# Patient Record
Sex: MALE | Race: WHITE | NOT HISPANIC OR LATINO | Employment: OTHER | URBAN - METROPOLITAN AREA
[De-identification: names, ages, dates, MRNs, and addresses within clinical notes are randomized per-mention and may not be internally consistent; named-entity substitution may affect disease eponyms.]

---

## 2023-09-27 NOTE — PROGRESS NOTES
"Counseling:  The patient was counseled regarding diagnostic results, instructions for management, risk factor reductions, prognosis, patient and family education, impressions, risks and benefits of treatment options and importance of compliance with treatment.      Chief Complaint:   The patient presents today for 1-week followup of HTN.      History Of Present Illness:    Darius Roe is a 78 y.o. male patient who presents today for 1-week followup of HTN. His PMH is significant for CAD s/p PTCA/stent to the RCA 05/30/2018, paroxysmal atrial fibrillation (on Eliquis), aortic regurgitation, HTN, hyperlipidemia and syncope. Today, the patient reports persistent exertional drops in his blood pressure, although somewhat improved from prior. He reports that his SBP is consistently in the 130-135 range at rest.     Last Recorded Vitals:  Vitals:    10/02/23 1022   BP: 150/88   BP Location: Left arm   Pulse: 86   Weight: 72.7 kg (160 lb 3.2 oz)   Height: 1.778 m (5' 10\")        Family History:  Family History   Problem Relation Name Age of Onset    Other (malignant neoplasm of ovary) Mother      Heart attack Mother      Other (cerebrovascular accident) Father      Other (diabetes mellitus) Father      Lung cancer Father      Other (metastatic to brain) Father      Pancreatic cancer Sister      Liver cancer Brother      Other (malignant neoplasm of prostate) Brother      Other (primary cervical cancer) Maternal Grandmother          Allergies:  Ciprofloxacin    Outpatient Medications:  Current Outpatient Medications   Medication Instructions    albuterol 90 mcg/actuation inhaler 2 puffs, inhalation, 4 times daily PRN    amLODIPine (Norvasc) 5 mg tablet 1 tablet, oral, Daily    apixaban (Eliquis) 5 mg tablet 1 tablet, oral, 2 times daily,  AS DIRECTED BY THE [facility] ([phone] EXT. [id number])<BR>    aspirin 81 mg chewable tablet 1 tablet, oral, Daily    cholecalciferol (Vitamin D-3) 25 MCG (1000 UT) capsule " oral, Take as directed    cholecalciferol, vitamin D3, (D3-50 CHOLECALCIFEROL ORAL) HOLECALCIFEROL TAB <BR>Patient sig: TAKE 800 BY MOUTH EVERY DAY<BR><BR><BR>    losartan-hydrochlorothiazide (Hyzaar) 50-12.5 mg tablet 1 tablet, oral, Daily    multivitamin tablet 1 tablet, oral, Daily       Review of Systems   All other systems reviewed and are negative.    Physical Exam:  Constitutional:       Appearance: Healthy appearance. Not in distress.   Neck:      Vascular: No JVR. JVD normal.   Pulmonary:      Effort: Pulmonary effort is normal.      Breath sounds: Normal breath sounds. No wheezing. No rhonchi. No rales.   Chest:      Chest wall: Not tender to palpatation.   Cardiovascular:      PMI at left midclavicular line. Normal rate. Regular rhythm. Normal S1. Normal S2.       Murmurs: There is no murmur.      No gallop.  No click. No rub.   Pulses:     Intact distal pulses.   Edema:     Peripheral edema absent.   Abdominal:      General: Bowel sounds are normal.      Palpations: Abdomen is soft.      Tenderness: There is no abdominal tenderness.   Musculoskeletal: Normal range of motion.         General: No tenderness. Skin:     General: Skin is warm and dry.   Neurological:      General: No focal deficit present.      Mental Status: Alert and oriented to person, place and time.       Last Labs:  CBC -  Lab Results   Component Value Date    WBC 9.6 09/02/2023    HGB 16.0 09/02/2023    HCT 44.8 09/02/2023    MCV 94 09/02/2023     09/02/2023       CMP -  Lab Results   Component Value Date    CALCIUM 9.3 09/02/2023    PROT 7.0 09/02/2023    ALBUMIN 4.0 09/02/2023    AST 14 09/02/2023    ALT 16 09/02/2023    ALKPHOS 94 09/02/2023    BILITOT 0.6 09/02/2023       LIPID PANEL -   Lab Results   Component Value Date    CHOL 157 04/14/2021    HDL 34.9 (A) 04/14/2021    CHHDL 4.5 04/14/2021    VLDL 44 (H) 04/14/2021    TRIG 222 (H) 04/14/2021    NHDL 122 04/14/2021       RENAL FUNCTION PANEL -   Lab Results   Component  Value Date    K 4.3 09/02/2023       Lab Results   Component Value Date    BNP 39 05/12/2021       Last Cardiology Tests:  08/23/2023 - TTE  1. Left ventricular systolic function is low normal with a 50-55% estimated ejection fraction.  2. Spectral Doppler shows an impaired relaxation pattern of left ventricular diastolic filling.  3. There is asymmetric left ventricular hypertrophy.  4. Moderate aortic valve regurgitation.    08/23/2022 - TTE  1. Left ventricular systolic function is normal with a 55% estimated ejection fraction.  2. Spectral Doppler shows an impaired relaxation pattern of left ventricular diastolic filling.  3. Mild to moderate aortic valve regurgitation.    08/23/2022 - Stress Test  1. There is a moderate-sized primarily fixed perfusion defect in the inferior wall which resolves on prone imaging suggesting diaphragmatic attenuation. There is a low probability of ischemia. Calculated ejection fraction of 59% without wall motion abnormalities seen.  2. No clinical or electrocardiographic evidence for ischemia at maximal infusion. No ECG changes from baseline. Normal Stress Test.    11/10/2021 - MRI/MRA Brain and Neck  1. MRI Brain: Punctate focus of hyperintense DWI signal left centrum semiovale is too small to definitely characterize, otherwise there is no evidence of acute infarct or intracranial hemorrhage. Mild diffuse parenchymal volume loss with non-specific white matter T2 and FLAIR signal changes, likely representing sequela of  small vessel disease.  2. MRA: Intracranial vertebral arteries are only partially visualized on current exam, with segment of intracranial vertebral artery described on previous CTA of the neck dated 11/09/2021 not fully included on the study. Dedicated MRA of the head is necessary for more a thorough evaluation. Within limits of the study, there is no evidence of major vessel cutoff or high-grade stenosis on MRA neck.    11/10/2021 - CTA Head  No evidence for  high-grade stenosis or large branch vessel cutoffs of thee intracranial vessels. Specifically intracranial left vertebral artery is diminutive in appearance likely due to anatomic variation but otherwise grossly unremarkable.    11/09/2021 - CTA Neck  1. Potential filling defect due to thrombus or eccentric mural plaque of the intracranial vertebral artery on the left which is otherwise diminutive in caliber due to developmental variation. There is a short segment of at least moderate to severe narrowing. MRI of the brain may be of benefit for further evaluation.  2. Atherosclerotic changes of the carotid bifurcations and proximal ICAs with up to approximately 40-45% stenosis on the left.  3. Advanced, multilevel degenerative changes of the cervical spine.  4. Extensive emphysematous changes.    09/27/2021 - Implantation of Loop Recorder    08/23/2021 - CT Head  No acute intracranial process.    04/26/2021 to 05/25/2021 - Heart Monitor  1. Baseline transmission showing sinus rhythm with a heart rate of 80 bpm.   2. 2 episodes of SVT, the longest of which was 13 seconds duration.   3. 2 pauses of greater than 2 seconds, the longest of which was 2.4 seconds.   4. No symptoms reported.  5. PAC burden of less than 1%.  6. PVC burden of less than 1%.     05/03/2021 - TTE  1. The left ventricular systolic function is normal with a 60-65% estimated ejection fraction.  2. There is mild aortic valve regurgitation.  3. 3.9 cm ascending aorta.    06/01/2020 - Cardiac Catheterization ([facility])  Mild non-obstructive coronary artery disease.    05/12/2020 - TTE  1. The left ventricular systolic function is normal with a 55% estimated ejection fraction.  2. Spectral Doppler shows an impaired relaxation pattern of left ventricular diastolic filling.  3. The mitral valve is moderately thickened.  4. RVSP within normal limits.  5. Aortic valve stenosis is not present.  6. There is moderate aortic valve regurgitation.    04/15/2019 -  Echocardiogram   1. Normal left ventricular regional systolic function with an ejection fraction of 55%.  2. Normal right ventricular size and systolic function.  3. Trace mitral regurgitation.  4. Moderate aortic regurgitation.  5. Mild tricuspid regurgitation.    05/30/2018 - Cardiac Catheterization ([facility])  1. Single vessel coronary artery disease without proximal left anterior descending involvement.   2. Culprit vessel(s): right coronary artery.  3. Elevated LV filling pressures.  4. Left Ventricular end-diastolic pressure = 30.     Assessment/Plan   1) CAD s/p PCI of RCA 2018  Continue aspirin 81 mg daily, atorvastatin 80 mg daily.  Goal LDL less than 70  TTE 08/23/2023 with LVEF 50-55%, asymmetric LVH, moderate AR  Patient endorses a h/o sleep apnea, unable to tolerate CPAP - reports sleep disturbance and daytime fatigue, is a light sleeper.   Recommend sleep apnea f/u  Repeat echo 1 year    2) Paroxysmal atrial fibrillation  [id number] score is 4  On Eliquis 5 mg BID  On beta blocker    3) Aortic Regurgitation  TTE May 2021 with mild AR  TTE 08/23/2023 with moderate AR  Repeat echo 1 year    4) HTN  ED evaluation 09/05/2023 at [facility] - BP elevated at 183/79  [facility]  Patient describes multiple medication changes/adjustments   Was started on losartan-HCTZ 100-25 mg, which made him feel unwell so was discontinued   Carvedilol previously discontinued  On amlodipine to 5 mg daily in the evening   On losartan-HCTZ 50-12.5 mg daily in the morning  Patient reports persistent exertional drops in BP, although somewhat improved from prior  SBP in the 130-135 range  Advised on adequate daily hydration    5) Recurrent syncope  Has loop recorder  Having recurrent lightheadedness upon standing  Recommend compression stockings  He attributes lightheadedness to amlodipine  Loop recorder interrogation 01/2023 with 3-4 pauses while  sleeping - no change in POC per EP  Sinus pauses continue to be found on loop recorder  Message sent to EP for input/recommendations    6) Ascending aorta dilatation  3.9 cm on TTE May 2021  3.7 cm on TTE Aug 2023   Repeat echo 1 year       Scribe Attestation  By signing my name below, I, Rosalina Salgado   attest that this documentation has been prepared under the direction and in the presence of Ko Lewis MD.

## 2023-09-28 PROBLEM — R42 VERTIGO: Status: ACTIVE | Noted: 2023-09-28

## 2023-09-28 PROBLEM — Z95.5 PRESENCE OF STENT IN CORONARY ARTERY: Status: ACTIVE | Noted: 2023-09-28

## 2023-09-28 PROBLEM — S82.409A CLOSED FRACTURE OF UPPER END OF FIBULA WITH TIBIA: Status: ACTIVE | Noted: 2023-09-28

## 2023-09-28 PROBLEM — G47.9 SLEEP DISORDER, UNSPECIFIED: Status: ACTIVE | Noted: 2023-09-28

## 2023-09-28 PROBLEM — K86.2 CYST OF PANCREAS (HHS-HCC): Status: ACTIVE | Noted: 2023-09-28

## 2023-09-28 PROBLEM — G47.33 OBSTRUCTIVE SLEEP APNEA (ADULT) (PEDIATRIC): Status: ACTIVE | Noted: 2023-09-28

## 2023-09-28 PROBLEM — I25.10 CAD (CORONARY ARTERY DISEASE): Status: ACTIVE | Noted: 2023-09-28

## 2023-09-28 PROBLEM — K57.30 DIVERTICULOSIS OF COLON: Status: ACTIVE | Noted: 2023-09-28

## 2023-09-28 PROBLEM — I25.2 HISTORY OF NON-ST ELEVATION MYOCARDIAL INFARCTION (NSTEMI): Status: ACTIVE | Noted: 2023-09-28

## 2023-09-28 PROBLEM — I95.1 ORTHOSTATIC SYNCOPE: Status: ACTIVE | Noted: 2023-09-28

## 2023-09-28 PROBLEM — S82.109A CLOSED FRACTURE OF UPPER END OF FIBULA WITH TIBIA: Status: ACTIVE | Noted: 2023-09-28

## 2023-09-28 PROBLEM — I77.810 ASCENDING AORTA DILATATION (CMS-HCC): Status: ACTIVE | Noted: 2023-09-28

## 2023-09-28 PROBLEM — I25.10 ATHEROSCLEROSIS OF NATIVE CORONARY ARTERY OF NATIVE HEART WITHOUT ANGINA PECTORIS: Status: ACTIVE | Noted: 2023-09-28

## 2023-09-28 PROBLEM — I35.1 AORTIC REGURGITATION: Status: ACTIVE | Noted: 2023-09-28

## 2023-09-28 PROBLEM — R55 SYNCOPE: Status: ACTIVE | Noted: 2023-09-28

## 2023-09-28 PROBLEM — I16.0 HYPERTENSIVE URGENCY: Status: ACTIVE | Noted: 2023-09-28

## 2023-09-28 PROBLEM — R26.81 UNSTEADINESS ON FEET: Status: ACTIVE | Noted: 2023-09-28

## 2023-09-28 PROBLEM — I48.91 A-FIB (MULTI): Status: ACTIVE | Noted: 2023-09-28

## 2023-09-28 PROBLEM — G47.30 SLEEP APNEA, UNSPECIFIED: Status: ACTIVE | Noted: 2023-09-28

## 2023-09-28 PROBLEM — J43.9 EMPHYSEMA LUNG (MULTI): Status: ACTIVE | Noted: 2023-09-28

## 2023-09-28 PROBLEM — K76.0 FATTY LIVER: Status: ACTIVE | Noted: 2023-09-28

## 2023-09-28 PROBLEM — R31.9 HEMATURIA: Status: ACTIVE | Noted: 2023-09-28

## 2023-09-28 PROBLEM — H81.11 BENIGN PAROXYSMAL POSITIONAL VERTIGO OF RIGHT EAR: Status: ACTIVE | Noted: 2023-09-28

## 2023-09-28 PROBLEM — H35.342 MACULAR CYST, HOLE, OR PSEUDOHOLE, LEFT EYE: Status: ACTIVE | Noted: 2023-09-28

## 2023-09-28 PROBLEM — D12.6 BENIGN NEOPLASM OF COLON: Status: ACTIVE | Noted: 2023-09-28

## 2023-09-28 PROBLEM — N28.1 RENAL CYST: Status: ACTIVE | Noted: 2023-09-28

## 2023-09-28 RX ORDER — AMLODIPINE BESYLATE AND ATORVASTATIN CALCIUM 10; 20 MG/1; MG/1
1 TABLET, FILM COATED ORAL DAILY
COMMUNITY
Start: 2007-12-03 | End: 2023-10-02

## 2023-09-28 RX ORDER — MULTIVITAMIN
1 TABLET ORAL DAILY
COMMUNITY

## 2023-09-28 RX ORDER — ATORVASTATIN CALCIUM 40 MG/1
1 TABLET, FILM COATED ORAL DAILY
COMMUNITY
End: 2023-10-02

## 2023-09-28 RX ORDER — LOSARTAN POTASSIUM AND HYDROCHLOROTHIAZIDE 25; 100 MG/1; MG/1
1 TABLET ORAL DAILY
COMMUNITY
Start: 2023-09-08 | End: 2023-10-02 | Stop reason: SINTOL

## 2023-09-28 RX ORDER — LISINOPRIL 5 MG/1
1 TABLET ORAL DAILY
COMMUNITY
End: 2023-10-02

## 2023-09-28 RX ORDER — ATORVASTATIN CALCIUM 80 MG/1
1 TABLET, FILM COATED ORAL DAILY
COMMUNITY
End: 2023-10-02

## 2023-09-28 RX ORDER — NAPROXEN SODIUM 220 MG/1
1 TABLET, FILM COATED ORAL DAILY
COMMUNITY

## 2023-09-28 RX ORDER — AMLODIPINE BESYLATE 5 MG/1
1 TABLET ORAL DAILY
COMMUNITY

## 2023-09-28 RX ORDER — LOSARTAN POTASSIUM AND HYDROCHLOROTHIAZIDE 12.5; 5 MG/1; MG/1
1 TABLET ORAL DAILY
COMMUNITY
End: 2024-05-13 | Stop reason: SDUPTHER

## 2023-09-28 RX ORDER — ALBUTEROL SULFATE 90 UG/1
2 AEROSOL, METERED RESPIRATORY (INHALATION) 4 TIMES DAILY PRN
COMMUNITY
Start: 2022-01-22

## 2023-09-28 RX ORDER — CARVEDILOL 12.5 MG/1
0.5 TABLET ORAL 2 TIMES DAILY
COMMUNITY
End: 2023-10-02

## 2023-09-28 RX ORDER — CARVEDILOL 6.25 MG/1
1 TABLET ORAL 2 TIMES DAILY
COMMUNITY
Start: 2021-05-21 | End: 2023-10-02

## 2023-09-28 RX ORDER — VIT C/E/ZN/COPPR/LUTEIN/ZEAXAN 250MG-90MG
CAPSULE ORAL
COMMUNITY

## 2023-10-02 ENCOUNTER — OFFICE VISIT (OUTPATIENT)
Dept: CARDIOLOGY | Facility: CLINIC | Age: 78
End: 2023-10-02
Payer: MEDICARE

## 2023-10-02 VITALS
HEIGHT: 70 IN | WEIGHT: 160.2 LBS | DIASTOLIC BLOOD PRESSURE: 88 MMHG | HEART RATE: 86 BPM | SYSTOLIC BLOOD PRESSURE: 150 MMHG | BODY MASS INDEX: 22.94 KG/M2

## 2023-10-02 DIAGNOSIS — I25.10 CORONARY ARTERY DISEASE INVOLVING NATIVE CORONARY ARTERY OF NATIVE HEART WITHOUT ANGINA PECTORIS: Primary | ICD-10-CM

## 2023-10-02 PROCEDURE — 3079F DIAST BP 80-89 MM HG: CPT | Performed by: INTERNAL MEDICINE

## 2023-10-02 PROCEDURE — 3077F SYST BP >= 140 MM HG: CPT | Performed by: INTERNAL MEDICINE

## 2023-10-02 PROCEDURE — 93000 ELECTROCARDIOGRAM COMPLETE: CPT | Performed by: INTERNAL MEDICINE

## 2023-10-02 PROCEDURE — 1159F MED LIST DOCD IN RCRD: CPT | Performed by: INTERNAL MEDICINE

## 2023-10-02 PROCEDURE — 99212 OFFICE O/P EST SF 10 MIN: CPT | Performed by: INTERNAL MEDICINE

## 2023-10-02 RX ORDER — ATORVASTATIN CALCIUM 40 MG/1
40 TABLET, FILM COATED ORAL DAILY
Qty: 90 TABLET | Refills: 1 | Status: SHIPPED | OUTPATIENT
Start: 2023-10-02 | End: 2024-03-27

## 2023-10-02 ASSESSMENT — ENCOUNTER SYMPTOMS
OCCASIONAL FEELINGS OF UNSTEADINESS: 0
DEPRESSION: 0
LOSS OF SENSATION IN FEET: 0

## 2023-10-02 NOTE — PATIENT INSTRUCTIONS
Continue all current medications as prescribed.   Please be sure to keep yourself very well hydrated throughout the day.  Followup with Dr. Lewis in 1 month.    If you have any questions or cardiac concerns, please call our office at [phone].

## 2023-10-02 NOTE — LETTER
"October 2, 2023       No Recipients    Patient: Darius Roe   YOB: 1945   Date of Visit: 10/2/2023       Dear Dr. Kwon Recipients:    Thank you for referring Darius Roe to me for evaluation. Below are my notes for this consultation.  If you have questions, please do not hesitate to call me. I look forward to following your patient along with you.       Sincerely,     Ko Lewis MD      CC:   No Recipients  ______________________________________________________________________________________    Counseling:  The patient was counseled regarding diagnostic results, instructions for management, risk factor reductions, prognosis, patient and family education, impressions, risks and benefits of treatment options and importance of compliance with treatment.      Chief Complaint:   The patient presents today for 1-week followup of HTN.      History Of Present Illness:    Darius Roe is a 78 y.o. male patient who presents today for 1-week followup of HTN. His PMH is significant for CAD s/p PTCA/stent to the RCA 05/30/2018, paroxysmal atrial fibrillation (on Eliquis), aortic regurgitation, HTN, hyperlipidemia and syncope. Today, the patient reports persistent exertional drops in his blood pressure, although somewhat improved from prior. He reports that his SBP is consistently in the 130-135 range at rest.     Last Recorded Vitals:  Vitals:    10/02/23 1022   BP: 150/88   BP Location: Left arm   Pulse: 86   Weight: 72.7 kg (160 lb 3.2 oz)   Height: 1.778 m (5' 10\")        Family History:  Family History   Problem Relation Name Age of Onset   • Other (malignant neoplasm of ovary) Mother     • Heart attack Mother     • Other (cerebrovascular accident) Father     • Other (diabetes mellitus) Father     • Lung cancer Father     • Other (metastatic to brain) Father     • Pancreatic cancer Sister     • Liver cancer Brother     • Other (malignant neoplasm of prostate) Brother     • Other (primary cervical " cancer) Maternal Grandmother          Allergies:  Ciprofloxacin    Outpatient Medications:  Current Outpatient Medications   Medication Instructions   • albuterol 90 mcg/actuation inhaler 2 puffs, inhalation, 4 times daily PRN   • amLODIPine (Norvasc) 5 mg tablet 1 tablet, oral, Daily   • apixaban (Eliquis) 5 mg tablet 1 tablet, oral, 2 times daily,  AS DIRECTED BY THE [facility] ([phone] EXT. [id number])<BR>   • aspirin 81 mg chewable tablet 1 tablet, oral, Daily   • cholecalciferol (Vitamin D-3) 25 MCG (1000 UT) capsule oral, Take as directed   • cholecalciferol, vitamin D3, (D3-50 CHOLECALCIFEROL ORAL) HOLECALCIFEROL TAB <BR>Patient sig: TAKE 800 BY MOUTH EVERY DAY<BR><BR><BR>   • losartan-hydrochlorothiazide (Hyzaar) 50-12.5 mg tablet 1 tablet, oral, Daily   • multivitamin tablet 1 tablet, oral, Daily       Review of Systems   All other systems reviewed and are negative.    Physical Exam:  Constitutional:       Appearance: Healthy appearance. Not in distress.   Neck:      Vascular: No JVR. JVD normal.   Pulmonary:      Effort: Pulmonary effort is normal.      Breath sounds: Normal breath sounds. No wheezing. No rhonchi. No rales.   Chest:      Chest wall: Not tender to palpatation.   Cardiovascular:      PMI at left midclavicular line. Normal rate. Regular rhythm. Normal S1. Normal S2.       Murmurs: There is no murmur.      No gallop.  No click. No rub.   Pulses:     Intact distal pulses.   Edema:     Peripheral edema absent.   Abdominal:      General: Bowel sounds are normal.      Palpations: Abdomen is soft.      Tenderness: There is no abdominal tenderness.   Musculoskeletal: Normal range of motion.         General: No tenderness. Skin:     General: Skin is warm and dry.   Neurological:      General: No focal deficit present.      Mental Status: Alert and oriented to person, place and time.       Last Labs:  CBC -  Lab Results   Component Value Date    WBC 9.6 09/02/2023    HGB 16.0 09/02/2023    HCT  44.8 09/02/2023    MCV 94 09/02/2023     09/02/2023       CMP -  Lab Results   Component Value Date    CALCIUM 9.3 09/02/2023    PROT 7.0 09/02/2023    ALBUMIN 4.0 09/02/2023    AST 14 09/02/2023    ALT 16 09/02/2023    ALKPHOS 94 09/02/2023    BILITOT 0.6 09/02/2023       LIPID PANEL -   Lab Results   Component Value Date    CHOL 157 04/14/2021    HDL 34.9 (A) 04/14/2021    CHHDL 4.5 04/14/2021    VLDL 44 (H) 04/14/2021    TRIG 222 (H) 04/14/2021    NHDL 122 04/14/2021       RENAL FUNCTION PANEL -   Lab Results   Component Value Date    K 4.3 09/02/2023       Lab Results   Component Value Date    BNP 39 05/12/2021       Last Cardiology Tests:  08/23/2023 - TTE  1. Left ventricular systolic function is low normal with a 50-55% estimated ejection fraction.  2. Spectral Doppler shows an impaired relaxation pattern of left ventricular diastolic filling.  3. There is asymmetric left ventricular hypertrophy.  4. Moderate aortic valve regurgitation.    08/23/2022 - TTE  1. Left ventricular systolic function is normal with a 55% estimated ejection fraction.  2. Spectral Doppler shows an impaired relaxation pattern of left ventricular diastolic filling.  3. Mild to moderate aortic valve regurgitation.    08/23/2022 - Stress Test  1. There is a moderate-sized primarily fixed perfusion defect in the inferior wall which resolves on prone imaging suggesting diaphragmatic attenuation. There is a low probability of ischemia. Calculated ejection fraction of 59% without wall motion abnormalities seen.  2. No clinical or electrocardiographic evidence for ischemia at maximal infusion. No ECG changes from baseline. Normal Stress Test.    11/10/2021 - MRI/MRA Brain and Neck  1. MRI Brain: Punctate focus of hyperintense DWI signal left centrum semiovale is too small to definitely characterize, otherwise there is no evidence of acute infarct or intracranial hemorrhage. Mild diffuse parenchymal volume loss with non-specific white  matter T2 and FLAIR signal changes, likely representing sequela of  small vessel disease.  2. MRA: Intracranial vertebral arteries are only partially visualized on current exam, with segment of intracranial vertebral artery described on previous CTA of the neck dated 11/09/2021 not fully included on the study. Dedicated MRA of the head is necessary for more a thorough evaluation. Within limits of the study, there is no evidence of major vessel cutoff or high-grade stenosis on MRA neck.    11/10/2021 - CTA Head  No evidence for high-grade stenosis or large branch vessel cutoffs of thee intracranial vessels. Specifically intracranial left vertebral artery is diminutive in appearance likely due to anatomic variation but otherwise grossly unremarkable.    11/09/2021 - CTA Neck  1. Potential filling defect due to thrombus or eccentric mural plaque of the intracranial vertebral artery on the left which is otherwise diminutive in caliber due to developmental variation. There is a short segment of at least moderate to severe narrowing. MRI of the brain may be of benefit for further evaluation.  2. Atherosclerotic changes of the carotid bifurcations and proximal ICAs with up to approximately 40-45% stenosis on the left.  3. Advanced, multilevel degenerative changes of the cervical spine.  4. Extensive emphysematous changes.    09/27/2021 - Implantation of Loop Recorder    08/23/2021 - CT Head  No acute intracranial process.    04/26/2021 to 05/25/2021 - Heart Monitor  1. Baseline transmission showing sinus rhythm with a heart rate of 80 bpm.   2. 2 episodes of SVT, the longest of which was 13 seconds duration.   3. 2 pauses of greater than 2 seconds, the longest of which was 2.4 seconds.   4. No symptoms reported.  5. PAC burden of less than 1%.  6. PVC burden of less than 1%.     05/03/2021 - TTE  1. The left ventricular systolic function is normal with a 60-65% estimated ejection fraction.  2. There is mild aortic valve  regurgitation.  3. 3.9 cm ascending aorta.    06/01/2020 - Cardiac Catheterization ([facility])  Mild non-obstructive coronary artery disease.    05/12/2020 - TTE  1. The left ventricular systolic function is normal with a 55% estimated ejection fraction.  2. Spectral Doppler shows an impaired relaxation pattern of left ventricular diastolic filling.  3. The mitral valve is moderately thickened.  4. RVSP within normal limits.  5. Aortic valve stenosis is not present.  6. There is moderate aortic valve regurgitation.    04/15/2019 - Echocardiogram   1. Normal left ventricular regional systolic function with an ejection fraction of 55%.  2. Normal right ventricular size and systolic function.  3. Trace mitral regurgitation.  4. Moderate aortic regurgitation.  5. Mild tricuspid regurgitation.    05/30/2018 - Cardiac Catheterization ([facility])  1. Single vessel coronary artery disease without proximal left anterior descending involvement.   2. Culprit vessel(s): right coronary artery.  3. Elevated LV filling pressures.  4. Left Ventricular end-diastolic pressure = 30.     Assessment/Plan  1) CAD s/p PCI of RCA 2018  Continue aspirin 81 mg daily, atorvastatin 80 mg daily.  Goal LDL less than 70  TTE 08/23/2023 with LVEF 50-55%, asymmetric LVH, moderate AR  Patient endorses a h/o sleep apnea, unable to tolerate CPAP - reports sleep disturbance and daytime fatigue, is a light sleeper.   Recommend sleep apnea f/u  Repeat echo 1 year    2) Paroxysmal atrial fibrillation  [id number] score is 4  On Eliquis 5 mg BID  On beta blocker    3) Aortic Regurgitation  TTE May 2021 with mild AR  TTE 08/23/2023 with moderate AR  Repeat echo 1 year    4) HTN  ED evaluation 09/05/2023 at [facility] - BP elevated at 183/79  [facility] that [facility]  Patient describes multiple medication changes/adjustments   Was started on losartan-HCTZ 100-25 mg, which made him feel unwell so was  discontinued   Carvedilol previously discontinued  On amlodipine to 5 mg daily in the evening   On losartan-HCTZ 50-12.5 mg daily in the morning  Patient reports persistent exertional drops in BP, although somewhat improved from prior  SBP in the 130-135 range  Advised on adequate daily hydration    5) Recurrent syncope  Has loop recorder  Having recurrent lightheadedness upon standing  Recommend compression stockings  He attributes lightheadedness to amlodipine  Loop recorder interrogation 01/2023 with 3-4 pauses while sleeping - no change in POC per EP  Sinus pauses continue to be found on loop recorder  Message sent to EP for input/recommendations    6) Ascending aorta dilatation  3.9 cm on TTE May 2021  3.7 cm on TTE Aug 2023   Repeat echo 1 year       Scribe Attestation  By signing my name below, I, Syeda Duron, [employer]   attest that this documentation has been prepared under the direction and in the presence of Ko Lewis MD.

## 2023-10-10 ENCOUNTER — HOSPITAL ENCOUNTER (OUTPATIENT)
Dept: CARDIOLOGY | Facility: HOSPITAL | Age: 78
Discharge: HOME | End: 2023-10-10
Payer: MEDICARE

## 2023-10-10 DIAGNOSIS — R55 SYNCOPE AND COLLAPSE: ICD-10-CM

## 2023-10-10 DIAGNOSIS — R55 SYNCOPE AND COLLAPSE: Primary | ICD-10-CM

## 2023-10-31 ENCOUNTER — HOSPITAL ENCOUNTER (OUTPATIENT)
Dept: CARDIOLOGY | Facility: HOSPITAL | Age: 78
Discharge: HOME | End: 2023-10-31
Payer: MEDICARE

## 2023-10-31 DIAGNOSIS — R55 SYNCOPE AND COLLAPSE: ICD-10-CM

## 2023-10-31 DIAGNOSIS — R55 SYNCOPE AND COLLAPSE: Primary | ICD-10-CM

## 2023-10-31 PROCEDURE — 93298 REM INTERROG DEV EVAL SCRMS: CPT | Performed by: INTERNAL MEDICINE

## 2023-11-12 NOTE — PROGRESS NOTES
Counseling:  The patient was counseled regarding diagnostic results, instructions for management, risk factor reductions, prognosis, patient and family education, impressions, risks and benefits of treatment options and importance of compliance with treatment.      Chief Complaint:   The patient presents today for 1-month followup of HTN.      History Of Present Illness:    Darius Roe is a 78 y.o. male patient who presents today for 1-month followup of HTN. His PMH is significant for CAD s/p PTCA/stent to the RCA 05/30/2018, paroxysmal atrial fibrillation (on Eliquis), aortic regurgitation, HTN, hyperlipidemia and syncope. He still has dizziness. But no syncope.he says symptoms are better when he takes his morning meds with food.     Last Recorded Vitals:  There were no vitals filed for this visit.       Family History:  Family History   Problem Relation Name Age of Onset    Other (malignant neoplasm of ovary) Mother      Heart attack Mother      Other (cerebrovascular accident) Father      Other (diabetes mellitus) Father      Lung cancer Father      Other (metastatic to brain) Father      Pancreatic cancer Sister      Liver cancer Brother      Other (malignant neoplasm of prostate) Brother      Other (primary cervical cancer) Maternal Grandmother          Allergies:  Ciprofloxacin    Outpatient Medications:  Current Outpatient Medications   Medication Instructions    albuterol 90 mcg/actuation inhaler 2 puffs, inhalation, 4 times daily PRN    amLODIPine (Norvasc) 5 mg tablet 1 tablet, oral, Daily    apixaban (Eliquis) 5 mg tablet 1 tablet, oral, 2 times daily,  AS DIRECTED BY THE [facility] ([phone] EXT. [id number])<BR>    aspirin 81 mg chewable tablet 1 tablet, oral, Daily    atorvastatin (LIPITOR) 40 mg, oral, Daily    cholecalciferol (Vitamin D-3) 25 MCG (1000 UT) capsule oral, Take as directed    losartan-hydrochlorothiazide (Hyzaar) 50-12.5 mg tablet 1 tablet, oral, Daily    multivitamin tablet 1  tablet, oral, Daily       Review of Systems   All other systems reviewed and are negative.    Physical Exam:  Constitutional:       Appearance: Healthy appearance. Not in distress.   Neck:      Vascular: No JVR. JVD normal.   Pulmonary:      Effort: Pulmonary effort is normal.      Breath sounds: Normal breath sounds. No wheezing. No rhonchi. No rales.   Chest:      Chest wall: Not tender to palpatation.   Cardiovascular:      PMI at left midclavicular line. Normal rate. Regular rhythm. Normal S1. Normal S2.       Murmurs: There is no murmur.      No gallop.  No click. No rub.   Pulses:     Intact distal pulses.   Edema:     Peripheral edema absent.   Abdominal:      General: Bowel sounds are normal.      Palpations: Abdomen is soft.      Tenderness: There is no abdominal tenderness.   Musculoskeletal: Normal range of motion.         General: No tenderness. Skin:     General: Skin is warm and dry.   Neurological:      General: No focal deficit present.      Mental Status: Alert and oriented to person, place and time.       Last Labs:  CBC -  Lab Results   Component Value Date    WBC 9.6 09/02/2023    HGB 16.0 09/02/2023    HCT 44.8 09/02/2023    MCV 94 09/02/2023     09/02/2023       CMP -  Lab Results   Component Value Date    CALCIUM 9.3 09/02/2023    PROT 7.0 09/02/2023    ALBUMIN 4.0 09/02/2023    AST 14 09/02/2023    ALT 16 09/02/2023    ALKPHOS 94 09/02/2023    BILITOT 0.6 09/02/2023       LIPID PANEL -   Lab Results   Component Value Date    CHOL 157 04/14/2021    HDL 34.9 (A) 04/14/2021    CHHDL 4.5 04/14/2021    VLDL 44 (H) 04/14/2021    TRIG 222 (H) 04/14/2021    NHDL 122 04/14/2021       RENAL FUNCTION PANEL -   Lab Results   Component Value Date    K 4.3 09/02/2023       Lab Results   Component Value Date    BNP 39 05/12/2021       Last Cardiology Tests:  08/23/2023 - TTE  1. Left ventricular systolic function is low normal with a 50-55% estimated ejection fraction.  2. Spectral Doppler shows an  impaired relaxation pattern of left ventricular diastolic filling.  3. There is asymmetric left ventricular hypertrophy.  4. Moderate aortic valve regurgitation.    08/23/2022 - TTE  1. Left ventricular systolic function is normal with a 55% estimated ejection fraction.  2. Spectral Doppler shows an impaired relaxation pattern of left ventricular diastolic filling.  3. Mild to moderate aortic valve regurgitation.    08/23/2022 - Stress Test  1. There is a moderate-sized primarily fixed perfusion defect in the inferior wall which resolves on prone imaging suggesting diaphragmatic attenuation. There is a low probability of ischemia. Calculated ejection fraction of 59% without wall motion abnormalities seen.  2. No clinical or electrocardiographic evidence for ischemia at maximal infusion. No ECG changes from baseline. Normal Stress Test.    11/10/2021 - MRI/MRA Brain and Neck  1. MRI Brain: Punctate focus of hyperintense DWI signal left centrum semiovale is too small to definitely characterize, otherwise there is no evidence of acute infarct or intracranial hemorrhage. Mild diffuse parenchymal volume loss with non-specific white matter T2 and FLAIR signal changes, likely representing sequela of  small vessel disease.  2. MRA: Intracranial vertebral arteries are only partially visualized on current exam, with segment of intracranial vertebral artery described on previous CTA of the neck dated 11/09/2021 not fully included on the study. Dedicated MRA of the head is necessary for more a thorough evaluation. Within limits of the study, there is no evidence of major vessel cutoff or high-grade stenosis on MRA neck.    11/10/2021 - CTA Head  No evidence for high-grade stenosis or large branch vessel cutoffs of thee intracranial vessels. Specifically intracranial left vertebral artery is diminutive in appearance likely due to anatomic variation but otherwise grossly unremarkable.    11/09/2021 - CTA Neck  1. Potential  filling defect due to thrombus or eccentric mural plaque of the intracranial vertebral artery on the left which is otherwise diminutive in caliber due to developmental variation. There is a short segment of at least moderate to severe narrowing. MRI of the brain may be of benefit for further evaluation.  2. Atherosclerotic changes of the carotid bifurcations and proximal ICAs with up to approximately 40-45% stenosis on the left.  3. Advanced, multilevel degenerative changes of the cervical spine.  4. Extensive emphysematous changes.    09/27/2021 - Implantation of Loop Recorder    08/23/2021 - CT Head  No acute intracranial process.    04/26/2021 to 05/25/2021 - Heart Monitor  1. Baseline transmission showing sinus rhythm with a heart rate of 80 bpm.   2. 2 episodes of SVT, the longest of which was 13 seconds duration.   3. 2 pauses of greater than 2 seconds, the longest of which was 2.4 seconds.   4. No symptoms reported.  5. PAC burden of less than 1%.  6. PVC burden of less than 1%.     05/03/2021 - TTE  1. The left ventricular systolic function is normal with a 60-65% estimated ejection fraction.  2. There is mild aortic valve regurgitation.  3. 3.9 cm ascending aorta.    06/01/2020 - Cardiac Catheterization (LH)  Mild non-obstructive coronary artery disease.    05/12/2020 - TTE  1. The left ventricular systolic function is normal with a 55% estimated ejection fraction.  2. Spectral Doppler shows an impaired relaxation pattern of left ventricular diastolic filling.  3. The mitral valve is moderately thickened.  4. RVSP within normal limits.  5. Aortic valve stenosis is not present.  6. There is moderate aortic valve regurgitation.    04/15/2019 - Echocardiogram   1. Normal left ventricular regional systolic function with an ejection fraction of 55%.  2. Normal right ventricular size and systolic function.  3. Trace mitral regurgitation.  4. Moderate aortic regurgitation.  5. Mild tricuspid  regurgitation.    05/30/2018 - Cardiac Catheterization (LH)  1. Single vessel coronary artery disease without proximal left anterior descending involvement.   2. Culprit vessel(s): right coronary artery.  3. Elevated LV filling pressures.  4. Left Ventricular end-diastolic pressure = 30.     Assessment/Plan   1) CAD s/p PCI of RCA 2018  Continue aspirin 81 mg daily, atorvastatin 80 mg daily.  Goal LDL less than 70  TTE 08/23/2023 with LVEF 50-55%, asymmetric LVH, moderate AR  Patient endorses a h/o sleep apnea, unable to tolerate CPAP - reports sleep disturbance and daytime fatigue, is a light sleeper.   Better now with CPAP  Repeat echo 1 year    2) Paroxysmal atrial fibrillation  [id number] score is 4  On Eliquis 5 mg BID  On beta blocker    3) Aortic Regurgitation  TTE May 2021 with mild AR  TTE 08/23/2023 with moderate AR  Repeat echo 1 year    4) HTN  ED evaluation 09/05/2023 at [facility] - BP elevated at 183/79  [facility]  Patient describes multiple medication changes/adjustments   Was started on losartan-HCTZ 100-25 mg, which made him feel unwell so was discontinued   Carvedilol previously discontinued  On amlodipine to 5 mg daily in the evening   On losartan-HCTZ 50-12.5 mg daily in the morning  Patient reports persistent exertional drops in BP, although somewhat improved from prior  SBP in the 130-135 range  Advised on adequate daily hydration    5) Recurrent syncope  Has loop recorder  Having recurrent lightheadedness upon standing  Recommend compression stockings  He attributes lightheadedness to amlodipine  Loop recorder interrogation 01/2023 with 3-4 pauses while sleeping - no change in POC per EP  Sinus pauses continue to be found on loop recorder  Repeat interrogation tomorrow    6) Ascending aorta dilatation  3.9 cm on TTE May 2021  3.7 cm on TTE Aug 2023   Repeat echo 1 year       Scribe Attestation  By signing  my name below, I, Rosalina Salgado   attest that this documentation has been prepared under the direction and in the presence of Ko Lewis MD.

## 2023-11-13 ENCOUNTER — OFFICE VISIT (OUTPATIENT)
Dept: CARDIOLOGY | Facility: CLINIC | Age: 78
End: 2023-11-13
Payer: MEDICARE

## 2023-11-13 VITALS
SYSTOLIC BLOOD PRESSURE: 128 MMHG | HEIGHT: 70 IN | WEIGHT: 165 LBS | BODY MASS INDEX: 23.62 KG/M2 | DIASTOLIC BLOOD PRESSURE: 68 MMHG | HEART RATE: 60 BPM

## 2023-11-13 DIAGNOSIS — I35.1 NONRHEUMATIC AORTIC VALVE INSUFFICIENCY: Primary | ICD-10-CM

## 2023-11-13 PROCEDURE — 3078F DIAST BP <80 MM HG: CPT | Performed by: INTERNAL MEDICINE

## 2023-11-13 PROCEDURE — 93000 ELECTROCARDIOGRAM COMPLETE: CPT | Performed by: INTERNAL MEDICINE

## 2023-11-13 PROCEDURE — 3074F SYST BP LT 130 MM HG: CPT | Performed by: INTERNAL MEDICINE

## 2023-11-13 PROCEDURE — 1159F MED LIST DOCD IN RCRD: CPT | Performed by: INTERNAL MEDICINE

## 2023-11-13 PROCEDURE — 99213 OFFICE O/P EST LOW 20 MIN: CPT | Performed by: INTERNAL MEDICINE

## 2023-11-13 PROCEDURE — 1160F RVW MEDS BY RX/DR IN RCRD: CPT | Performed by: INTERNAL MEDICINE

## 2023-11-13 ASSESSMENT — ENCOUNTER SYMPTOMS
OCCASIONAL FEELINGS OF UNSTEADINESS: 1
LOSS OF SENSATION IN FEET: 0
DEPRESSION: 0

## 2023-11-13 NOTE — LETTER
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 Below are my notes for this consultation.  If you have questions, please do not hesitate to call me. I look forward to following your patient along with you.       Sincerely,     Ko Lewis MD      CC: No Recipients  ______________________________________________________________________________________    Counseling:  The patient was counseled regarding diagnostic results, instructions for management, risk factor reductions, prognosis, patient and family education, impressions, risks and benefits of treatment options and importance of compliance with treatment.      Chief Complaint:   The patient presents today for 1-month followup of HTN.      History Of Present Illness:    Darius Roe is a 78 y.o. male patient who presents today for 1-month followup of HTN. His PMH is significant for CAD s/p PTCA/stent to the RCA 05/30/2018, paroxysmal atrial fibrillation (on Eliquis), aortic regurgitation, HTN, hyperlipidemia and syncope. He still has dizziness. But no syncope.he says symptoms are better when he takes his morning meds with food.     Last Recorded Vitals:  There were no vitals filed for this visit.       Family History:  Family History   Problem Relation Name Age of Onset   • Other (malignant neoplasm of ovary) Mother     • Heart attack Mother     • Other (cerebrovascular accident) Father     • Other (diabetes mellitus) Father     • Lung cancer Father     • Other (metastatic to brain) Father     • Pancreatic cancer Sister     • Liver cancer Brother     • Other (malignant neoplasm of prostate) Brother     • Other (primary cervical cancer) Maternal Grandmother          Allergies:  Ciprofloxacin    Outpatient Medications:  Current Outpatient Medications   Medication Instructions   • albuterol 90  mcg/actuation inhaler 2 puffs, inhalation, 4 times daily PRN   • amLODIPine (Norvasc) 5 mg tablet 1 tablet, oral, Daily   • apixaban (Eliquis) 5 mg tablet 1 tablet, oral, 2 times daily,  AS DIRECTED BY THE [facility] ([phone] EXT. [id number])<BR>   • aspirin 81 mg chewable tablet 1 tablet, oral, Daily   • atorvastatin (LIPITOR) 40 mg, oral, Daily   • cholecalciferol (Vitamin D-3) 25 MCG (1000 UT) capsule oral, Take as directed   • losartan-hydrochlorothiazide (Hyzaar) 50-12.5 mg tablet 1 tablet, oral, Daily   • multivitamin tablet 1 tablet, oral, Daily       Review of Systems   All other systems reviewed and are negative.    Physical Exam:  Constitutional:       Appearance: Healthy appearance. Not in distress.   Neck:      Vascular: No JVR. JVD normal.   Pulmonary:      Effort: Pulmonary effort is normal.      Breath sounds: Normal breath sounds. No wheezing. No rhonchi. No rales.   Chest:      Chest wall: Not tender to palpatation.   Cardiovascular:      PMI at left midclavicular line. Normal rate. Regular rhythm. Normal S1. Normal S2.       Murmurs: There is no murmur.      No gallop.  No click. No rub.   Pulses:     Intact distal pulses.   Edema:     Peripheral edema absent.   Abdominal:      General: Bowel sounds are normal.      Palpations: Abdomen is soft.      Tenderness: There is no abdominal tenderness.   Musculoskeletal: Normal range of motion.         General: No tenderness. Skin:     General: Skin is warm and dry.   Neurological:      General: No focal deficit present.      Mental Status: Alert and oriented to person, place and time.       Last Labs:  CBC -  Lab Results   Component Value Date    WBC 9.6 09/02/2023    HGB 16.0 09/02/2023    HCT 44.8 09/02/2023    MCV 94 09/02/2023     09/02/2023       CMP -  Lab Results   Component Value Date    CALCIUM 9.3 09/02/2023    PROT 7.0 09/02/2023    ALBUMIN 4.0 09/02/2023    AST 14 09/02/2023    ALT 16 09/02/2023    ALKPHOS 94 09/02/2023     BILITOT 0.6 09/02/2023       LIPID PANEL -   Lab Results   Component Value Date    CHOL 157 04/14/2021    HDL 34.9 (A) 04/14/2021    CHHDL 4.5 04/14/2021    VLDL 44 (H) 04/14/2021    TRIG 222 (H) 04/14/2021    NHDL 122 04/14/2021       RENAL FUNCTION PANEL -   Lab Results   Component Value Date    K 4.3 09/02/2023       Lab Results   Component Value Date    BNP 39 05/12/2021       Last Cardiology Tests:  08/23/2023 - TTE  1. Left ventricular systolic function is low normal with a 50-55% estimated ejection fraction.  2. Spectral Doppler shows an impaired relaxation pattern of left ventricular diastolic filling.  3. There is asymmetric left ventricular hypertrophy.  4. Moderate aortic valve regurgitation.    08/23/2022 - TTE  1. Left ventricular systolic function is normal with a 55% estimated ejection fraction.  2. Spectral Doppler shows an impaired relaxation pattern of left ventricular diastolic filling.  3. Mild to moderate aortic valve regurgitation.    08/23/2022 - Stress Test  1. There is a moderate-sized primarily fixed perfusion defect in the inferior wall which resolves on prone imaging suggesting diaphragmatic attenuation. There is a low probability of ischemia. Calculated ejection fraction of 59% without wall motion abnormalities seen.  2. No clinical or electrocardiographic evidence for ischemia at maximal infusion. No ECG changes from baseline. Normal Stress Test.    11/10/2021 - MRI/MRA Brain and Neck  1. MRI Brain: Punctate focus of hyperintense DWI signal left centrum semiovale is too small to definitely characterize, otherwise there is no evidence of acute infarct or intracranial hemorrhage. Mild diffuse parenchymal volume loss with non-specific white matter T2 and FLAIR signal changes, likely representing sequela of  small vessel disease.  2. MRA: Intracranial vertebral arteries are only partially visualized on current exam, with segment of intracranial vertebral artery described on previous CTA of  the neck dated 11/09/2021 not fully included on the study. Dedicated MRA of the head is necessary for more a thorough evaluation. Within limits of the study, there is no evidence of major vessel cutoff or high-grade stenosis on MRA neck.    11/10/2021 - CTA Head  No evidence for high-grade stenosis or large branch vessel cutoffs of thee intracranial vessels. Specifically intracranial left vertebral artery is diminutive in appearance likely due to anatomic variation but otherwise grossly unremarkable.    11/09/2021 - CTA Neck  1. Potential filling defect due to thrombus or eccentric mural plaque of the intracranial vertebral artery on the left which is otherwise diminutive in caliber due to developmental variation. There is a short segment of at least moderate to severe narrowing. MRI of the brain may be of benefit for further evaluation.  2. Atherosclerotic changes of the carotid bifurcations and proximal ICAs with up to approximately 40-45% stenosis on the left.  3. Advanced, multilevel degenerative changes of the cervical spine.  4. Extensive emphysematous changes.    09/27/2021 - Implantation of Loop Recorder    08/23/2021 - CT Head  No acute intracranial process.    04/26/2021 to 05/25/2021 - Heart Monitor  1. Baseline transmission showing sinus rhythm with a heart rate of 80 bpm.   2. 2 episodes of SVT, the longest of which was 13 seconds duration.   3. 2 pauses of greater than 2 seconds, the longest of which was 2.4 seconds.   4. No symptoms reported.  5. PAC burden of less than 1%.  6. PVC burden of less than 1%.     05/03/2021 - TTE  1. The left ventricular systolic function is normal with a 60-65% estimated ejection fraction.  2. There is mild aortic valve regurgitation.  3. 3.9 cm ascending aorta.    06/01/2020 - Cardiac Catheterization (LH)  Mild non-obstructive coronary artery disease.    05/12/2020 - TTE  1. The left ventricular systolic function is normal with a 55% estimated ejection fraction.  2.  Spectral Doppler shows an impaired relaxation pattern of left ventricular diastolic filling.  3. The mitral valve is moderately thickened.  4. RVSP within normal limits.  5. Aortic valve stenosis is not present.  6. There is moderate aortic valve regurgitation.    04/15/2019 - Echocardiogram   1. Normal left ventricular regional systolic function with an ejection fraction of 55%.  2. Normal right ventricular size and systolic function.  3. Trace mitral regurgitation.  4. Moderate aortic regurgitation.  5. Mild tricuspid regurgitation.    05/30/2018 - Cardiac Catheterization (LH)  1. Single vessel coronary artery disease without proximal left anterior descending involvement.   2. Culprit vessel(s): right coronary artery.  3. Elevated LV filling pressures.  4. Left Ventricular end-diastolic pressure = 30.     Assessment/Plan  1) CAD s/p PCI of RCA 2018  Continue aspirin 81 mg daily, atorvastatin 80 mg daily.  Goal LDL less than 70  TTE 08/23/2023 with LVEF 50-55%, asymmetric LVH, moderate AR  Patient endorses a h/o sleep apnea, unable to tolerate CPAP - reports sleep disturbance and daytime fatigue, is a light sleeper.   Better now with CPAP  Repeat echo 1 year    2) Paroxysmal atrial fibrillation  [id number] score is 4  On Eliquis 5 mg BID  On beta blocker    3) Aortic Regurgitation  TTE May 2021 with mild AR  TTE 08/23/2023 with moderate AR  Repeat echo 1 year    4) HTN  ED evaluation 09/05/20[facility] at [facility] - BP elevated at 183/79  [facility] reports [facility]  Patient describes multiple medication changes/adjustments   Was started on losartan-HCTZ 100-25 mg, which made him feel unwell so was discontinued   Carvedilol previously discontinued  On amlodipine to 5 mg daily in the evening   On losartan-HCTZ 50-12.5 mg daily in the morning  Patient reports persistent exertional drops in BP, although somewhat improved from prior  SBP in the 130-135  range  Advised on adequate daily hydration    5) Recurrent syncope  Has loop recorder  Having recurrent lightheadedness upon standing  Recommend compression stockings  He attributes lightheadedness to amlodipine  Loop recorder interrogation 01/2023 with 3-4 pauses while sleeping - no change in POC per EP  Sinus pauses continue to be found on loop recorder  Repeat interrogation tomorrow    6) Ascending aorta dilatation  3.9 cm on TTE May 2021  3.7 cm on TTE Aug 2023   Repeat echo 1 year       Scribe Attestation  By signing my name below, I, Syeda Duron, [employer]   attest that this documentation has been prepared under the direction and in the presence of Ko Lewis MD.

## 2023-11-14 ENCOUNTER — HOSPITAL ENCOUNTER (OUTPATIENT)
Dept: CARDIOLOGY | Facility: HOSPITAL | Age: 78
Discharge: HOME | End: 2023-11-14
Payer: MEDICARE

## 2023-11-14 DIAGNOSIS — R55 SYNCOPE AND COLLAPSE: ICD-10-CM

## 2023-11-21 ENCOUNTER — HOSPITAL ENCOUNTER (OUTPATIENT)
Dept: CARDIOLOGY | Facility: HOSPITAL | Age: 78
Discharge: HOME | End: 2023-11-21
Payer: MEDICARE

## 2023-11-21 DIAGNOSIS — R55 SYNCOPE AND COLLAPSE: ICD-10-CM

## 2024-01-02 ENCOUNTER — HOSPITAL ENCOUNTER (OUTPATIENT)
Dept: CARDIOLOGY | Facility: HOSPITAL | Age: 79
Discharge: HOME | End: 2024-01-02
Payer: MEDICARE

## 2024-01-02 DIAGNOSIS — R55 SYNCOPE AND COLLAPSE: ICD-10-CM

## 2024-02-20 ENCOUNTER — HOSPITAL ENCOUNTER (OUTPATIENT)
Dept: CARDIOLOGY | Facility: HOSPITAL | Age: 79
Discharge: HOME | End: 2024-02-20
Payer: MEDICARE

## 2024-02-20 DIAGNOSIS — R55 SYNCOPE AND COLLAPSE: ICD-10-CM

## 2024-02-20 DIAGNOSIS — Z95.818 IMPLANTABLE LOOP RECORDER PRESENT: Primary | ICD-10-CM

## 2024-02-20 PROCEDURE — 93298 REM INTERROG DEV EVAL SCRMS: CPT

## 2024-02-20 PROCEDURE — 93298 REM INTERROG DEV EVAL SCRMS: CPT | Performed by: INTERNAL MEDICINE

## 2024-03-26 DIAGNOSIS — I25.10 CORONARY ARTERY DISEASE INVOLVING NATIVE CORONARY ARTERY OF NATIVE HEART WITHOUT ANGINA PECTORIS: ICD-10-CM

## 2024-03-26 DIAGNOSIS — E78.2 MIXED HYPERLIPIDEMIA: Primary | ICD-10-CM

## 2024-03-27 RX ORDER — ATORVASTATIN CALCIUM 40 MG/1
40 TABLET, FILM COATED ORAL NIGHTLY
Qty: 90 TABLET | Refills: 0 | Status: SHIPPED | OUTPATIENT
Start: 2024-03-27 | End: 2024-05-13 | Stop reason: SDUPTHER

## 2024-03-27 NOTE — TELEPHONE ENCOUNTER
I called and spoke with patient and [facility]. [facility]

## 2024-03-27 NOTE — TELEPHONE ENCOUNTER
Received electronic refill request for Atorvastatin 40 mg    Last Appointment: 11/13/23 with Dr Lewis  Next Appointment: 5/13/24 with Genoveva Gracia  Last Labs: Lipid 2021  and CMP 9/2/23--Order placed. Reminder set to call and let patient know he needs labs.  Last Refilled: 10/2/23 90 days 1 refill

## 2024-04-23 ENCOUNTER — HOSPITAL ENCOUNTER (OUTPATIENT)
Dept: CARDIOLOGY | Facility: HOSPITAL | Age: 79
Discharge: HOME | End: 2024-04-23
Payer: MEDICARE

## 2024-04-23 DIAGNOSIS — R55 SYNCOPE AND COLLAPSE: ICD-10-CM

## 2024-04-23 DIAGNOSIS — Z95.818 IMPLANTABLE LOOP RECORDER PRESENT: ICD-10-CM

## 2024-05-07 PROBLEM — I35.1 NONRHEUMATIC AORTIC (VALVE) INSUFFICIENCY: Status: ACTIVE | Noted: 2023-08-23

## 2024-05-07 PROBLEM — G47.33 OBSTRUCTIVE SLEEP APNEA OF ADULT: Status: ACTIVE | Noted: 2023-09-28

## 2024-05-07 PROBLEM — R06.3 PERIODIC BREATHING: Status: ACTIVE | Noted: 2024-05-07

## 2024-05-07 PROBLEM — S82.90XA: Status: ACTIVE | Noted: 2024-05-07

## 2024-05-07 PROBLEM — R55 SYNCOPE AND COLLAPSE: Status: ACTIVE | Noted: 2023-09-12

## 2024-05-09 PROBLEM — Z95.818 IMPLANTABLE LOOP RECORDER PRESENT: Status: ACTIVE | Noted: 2024-05-09

## 2024-05-09 ASSESSMENT — ENCOUNTER SYMPTOMS
FEVER: 0
NAUSEA: 0
HEMATURIA: 0
VOMITING: 0
SYNCOPE: 0
ORTHOPNEA: 0
WHEEZING: 0
HEMATOCHEZIA: 0
SHORTNESS OF BREATH: 0
ALTERED MENTAL STATUS: 0
COUGH: 0
IRREGULAR HEARTBEAT: 0
PALPITATIONS: 0
NEAR-SYNCOPE: 0
CHILLS: 0

## 2024-05-09 NOTE — PATIENT INSTRUCTIONS
Recommend Mediterranean style of eating  Continue current medications  Recommend knee-high compression stockings.  18 to 30 mmHg.  Apply upon awakening and remove at bedtime.   Check echo as scheduled in Aug   Follow-up with Dr. Lewis in 3 months  If you have any questions or cardiac concerns, please call our office at [phone].

## 2024-05-09 NOTE — PROGRESS NOTES
Chief Complaint/Reason for Visit:  6 month follow up 6 month cardiovascular follow up    History Of Present Illness:    Darius Roe is a 79 y.o. male that presents to the office for 6 month follow up.    Taking medications as prescribed.     PMH significant for CAD status post PTCA/stent to the RCA 5/30/18, paroxysmal atrial fibrillation, aortic regurgitation and COPD. He smokes cigars when golfing (about once a week).     He does monitor his BP at home and typically 110-120/70's.    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    Past Surgical History:  He has a past surgical history that includes Coronary angioplasty (06/12/2018); Other surgical history (07/16/2020); CT angio neck (11/9/2021); MR angio neck w and wo IV contrast (11/10/2021); and CT angio head w and wo IV contrast (11/10/2021).      Social History:  He reports that he has been smoking cigars. He has never used smokeless tobacco. He reports that he does not currently use alcohol after a past usage of about 1.0 standard drink of alcohol per week. He reports that he does not use drugs.    Family History:  Family History   Problem Relation Name Age of Onset    Other (malignant neoplasm of ovary) Mother      Heart attack Mother      Other (cerebrovascular accident) Father      Other (diabetes mellitus) Father      Lung cancer Father      Other (metastatic to brain) Father      Pancreatic cancer Sister      Liver cancer Brother      Other (malignant neoplasm of prostate) Brother      Other (primary cervical cancer) Maternal Grandmother          Allergies:  Ciprofloxacin    Review of Systems   Constitutional: Negative for chills and fever.   Cardiovascular:  Positive for dyspnea on exertion (chronic). Negative for chest pain, irregular heartbeat, leg swelling, near-syncope, orthopnea, palpitations and  syncope.   Respiratory:  Negative for cough, shortness of breath and wheezing.    Gastrointestinal:  Negative for hematochezia, melena, nausea and vomiting.   Genitourinary:  Negative for hematuria.   Neurological:  Positive for light-headedness (intermittent) and numbness (intermittent bilateral feet). Negative for dizziness and paresthesias.   Psychiatric/Behavioral:  Negative for altered mental status.        Objective      Vitals reviewed.   Constitutional:       Appearance: Chronically ill-appearing.   Pulmonary:      Effort: Pulmonary effort is normal.      Breath sounds: Decreased breath sounds present.      Comments: +diminished breath sounds throughout  Cardiovascular:      PMI at left midclavicular line. Normal rate. Regular rhythm. S1 with normal intensity. S2 with normal intensity.       Murmurs: There is no murmur.   Edema:     Peripheral edema absent.   Abdominal:      General: Bowel sounds are normal.   Skin:     General: Skin is warm and dry.   Psychiatric:         Attention and Perception: Attention normal.         Mood and Affect: Mood normal.         Behavior: Behavior is cooperative.         Current Outpatient Medications   Medication Instructions    albuterol 90 mcg/actuation inhaler 2 puffs, inhalation, 4 times daily PRN    amLODIPine (Norvasc) 5 mg tablet 1 tablet, oral, Daily    apixaban (Eliquis) 5 mg tablet 1 tablet, oral, 2 times daily,  AS DIRECTED BY THE [facility] ([phone] EXT. [phone])<BR>    aspirin 81 mg chewable tablet 1 tablet, oral, Daily    atorvastatin (LIPITOR) 40 mg, oral, Nightly    cholecalciferol (Vitamin D-3) 25 MCG (1000 UT) capsule oral, Take as directed    losartan-hydrochlorothiazide (Hyzaar) 50-12.5 mg tablet 1 tablet, oral, Daily    multivitamin tablet 1 tablet, oral, Daily        Last Labs:  CBC -  Lab Results   Component Value Date    WBC 9.6 09/02/2023    HGB 16.0 09/02/2023    HCT 44.8 09/02/2023    MCV 94 09/02/2023     09/02/2023       RENAL  "FUNCTION PANEL -   Lab Results   Component Value Date    GLUCOSE 110 (H) 09/02/2023     09/02/2023    K 4.3 09/02/2023     09/02/2023    CO2 27 09/02/2023    ANIONGAP 9 (L) 09/02/2023    BUN 14 09/02/2023    CREATININE 0.95 09/02/2023    GFRMALE 82 09/02/2023    CALCIUM 9.3 09/02/2023    ALBUMIN 4.0 09/02/2023        CMP -  Lab Results   Component Value Date    CALCIUM 9.3 09/02/2023    PROT 7.0 09/02/2023    ALBUMIN 4.0 09/02/2023    AST 14 09/02/2023    ALT 16 09/02/2023    ALKPHOS 94 09/02/2023    BILITOT 0.6 09/02/2023       LIPID PANEL -   Lab Results   Component Value Date    CHOL 157 04/14/2021    TRIG 222 (H) 04/14/2021    HDL 34.9 (A) 04/14/2021    CHHDL 4.5 04/14/2021    LDLF 78 04/14/2021    VLDL 44 (H) 04/14/2021    NHDL 122 04/14/2021     No results found for: \"LDLCALC\"    Lab Results   Component Value Date    BNP 39 05/12/2021       Lab Results   Component Value Date    TSH 2.01 12/31/2019       No results found for this or any previous visit.     Last Cardiology Tests:    Last implantable loop recorder interrogation 2/20/24.    Echo 8/23/23:   1. Left ventricular systolic function is low normal with a 50-55% estimated ejection fraction.  2. Spectral Doppler shows an impaired relaxation pattern of left ventricular diastolic filling.  3. There is asymmetric left ventricular hypertrophy.  4. Moderate aortic valve regurgitation.    Stress test 8/23/22:   1. Correlate with myocardial perfusion imaging results.   2. No clinical or electrocardiographic evidence for ischemia at maximal infusion.   3. No ECG changes from baseline.   4. Normal Stress Test.   5. Nuclear image results are reported separately.  1. There is a moderate-sized primarily fixed perfusion defect in the  inferior wall which resolves on prone imaging suggesting  diaphragmatic attenuation. There is a low probability of ischemia.  2. Calculated ejection fraction of 59% without wall motion  abnormalities seen.      TTE 5/3/2021 " "showed LV systolic function is normal with an EF of 60 to 65%. Mild aortic valve regurgitation. 3.9 cm ascending aorta.     Event recorder 4/26/2021 through 5/25/2021 showed no atrial fibrillation, heart block or VT. Baseline transmission showing sinus rhythm with a heart rate of 80 bpm. 2 episodes of SVT longest of which was 13 seconds duration. Longest pause of 2.4 seconds.     [facility] 6/1/2020 showed patent stent to the RCA. Mild nonobstructive CAD.     [facility] 5/30/18 showed 90% stenosis of the mid RCA.he is status post PCI of the RCA. Elevated LV filling pressures. LV end-diastolic pressure was 30.    Visit Vitals  /78 (BP Location: Left arm, Patient Position: Sitting, BP Cuff Size: Adult)   Pulse 76   Ht 1.778 m (5' 10\")   Wt 76.6 kg (168 lb 12.8 oz)   SpO2 94%   BMI 24.22 kg/m²   Smoking Status Some Days   BSA 1.95 m²       Assessment/Plan   The primary encounter diagnosis was Coronary artery disease involving native coronary artery of native heart without angina pectoris. Diagnoses of Paroxysmal atrial fibrillation (Multi), Aortic valve insufficiency, etiology of cardiac valve disease unspecified, Benign essential hypertension, Implantable loop recorder present, Ascending aorta dilatation (CMS-HCC), and Nonrheumatic aortic valve insufficiency were also pertinent to this visit.    1. CAD s/p PCI of RCA 2018  Continue aspirin 81 mg daily.  Continue atorvastatin 80 mg daily  Beta blocker stopped previously likely 2/2 nocturnal pauses on loop recorder (h/o CHRISTINE)   Stress test Aug 2022 with low probability of ischemia  TTE Aug 2023 with LVEF 50-55%     2. Paroxysmal atrial fibrillation  [id number] score is 4. (Hypertension - Yes, 1 point, Vascular Disease - Yes, 1 point, and Age over 75 years - 2 points)  Continue apixaban 5 mg BID    Beta blocker stopped previously likely 2/2 nocturnal pauses on loop recorder   He reports that he has a [employer] mobile device that has not detected any atrial fibrillation.     3. " Aortic Regurgitation  TTE May 2021 with mild AR  TTE Aug 2023 with moderate aortic valve regurgitation  Has repeat echo scheduled Aug 2024     4. HTN  BP stable  Continue losartan-hydrochlorothiazide 50-12.5 mg daily and amlodipine 5 mg daily     5. Recurrent syncope  Has loop recorder  Having recurrent lightheadedness upon standing. Recommend compression stockings  Has nocturnal pauses noted on loop recorder that are documented to have occurred when he was not wearing CPAP     6. Ascending aorta dilatation  3.9 cm on TTE May 2021  TTE Aug 2023 with ascending aorta 3.7 cm and aortic root 4 cm  Has f/u echo scheduled Aug 2024    7. Dyslipidemia  Goal LDL <70.  Currently at goal per lab work Aug 2023 [facility] (scanned into chart)  Continue atorvastatin 40 mg daily.     Genoveva Gracia, APRN-CNP

## 2024-05-13 ENCOUNTER — OFFICE VISIT (OUTPATIENT)
Dept: CARDIOLOGY | Facility: CLINIC | Age: 79
End: 2024-05-13
Payer: MEDICARE

## 2024-05-13 VITALS
BODY MASS INDEX: 24.17 KG/M2 | DIASTOLIC BLOOD PRESSURE: 78 MMHG | HEART RATE: 76 BPM | HEIGHT: 70 IN | OXYGEN SATURATION: 94 % | WEIGHT: 168.8 LBS | SYSTOLIC BLOOD PRESSURE: 124 MMHG

## 2024-05-13 DIAGNOSIS — I77.810 ASCENDING AORTA DILATATION (CMS-HCC): ICD-10-CM

## 2024-05-13 DIAGNOSIS — I48.0 PAROXYSMAL ATRIAL FIBRILLATION (MULTI): ICD-10-CM

## 2024-05-13 DIAGNOSIS — I35.1 NONRHEUMATIC AORTIC VALVE INSUFFICIENCY: ICD-10-CM

## 2024-05-13 DIAGNOSIS — Z95.818 IMPLANTABLE LOOP RECORDER PRESENT: ICD-10-CM

## 2024-05-13 DIAGNOSIS — I35.1 AORTIC VALVE INSUFFICIENCY, ETIOLOGY OF CARDIAC VALVE DISEASE UNSPECIFIED: ICD-10-CM

## 2024-05-13 DIAGNOSIS — E78.5 HYPERLIPIDEMIA, UNSPECIFIED HYPERLIPIDEMIA TYPE: ICD-10-CM

## 2024-05-13 DIAGNOSIS — I25.10 CORONARY ARTERY DISEASE INVOLVING NATIVE CORONARY ARTERY OF NATIVE HEART WITHOUT ANGINA PECTORIS: Primary | ICD-10-CM

## 2024-05-13 DIAGNOSIS — I10 BENIGN ESSENTIAL HYPERTENSION: ICD-10-CM

## 2024-05-13 PROCEDURE — 1159F MED LIST DOCD IN RCRD: CPT | Performed by: NURSE PRACTITIONER

## 2024-05-13 PROCEDURE — 1160F RVW MEDS BY RX/DR IN RCRD: CPT | Performed by: NURSE PRACTITIONER

## 2024-05-13 PROCEDURE — 3078F DIAST BP <80 MM HG: CPT | Performed by: NURSE PRACTITIONER

## 2024-05-13 PROCEDURE — 99214 OFFICE O/P EST MOD 30 MIN: CPT | Performed by: NURSE PRACTITIONER

## 2024-05-13 PROCEDURE — 3074F SYST BP LT 130 MM HG: CPT | Performed by: NURSE PRACTITIONER

## 2024-05-13 RX ORDER — LOSARTAN POTASSIUM AND HYDROCHLOROTHIAZIDE 12.5; 5 MG/1; MG/1
1 TABLET ORAL DAILY
Qty: 90 TABLET | Refills: 2 | Status: SHIPPED | OUTPATIENT
Start: 2024-05-13 | End: 2025-02-07

## 2024-05-13 RX ORDER — ATORVASTATIN CALCIUM 40 MG/1
40 TABLET, FILM COATED ORAL NIGHTLY
Qty: 90 TABLET | Refills: 3 | Status: SHIPPED | OUTPATIENT
Start: 2024-05-13 | End: 2025-05-13

## 2024-05-13 SDOH — ECONOMIC STABILITY: FOOD INSECURITY: WITHIN THE PAST 12 MONTHS, YOU WORRIED THAT YOUR FOOD WOULD RUN OUT BEFORE YOU GOT MONEY TO BUY MORE.: NEVER TRUE

## 2024-05-13 SDOH — ECONOMIC STABILITY: FOOD INSECURITY: WITHIN THE PAST 12 MONTHS, THE FOOD YOU BOUGHT JUST DIDN'T LAST AND YOU DIDN'T HAVE MONEY TO GET MORE.: NEVER TRUE

## 2024-05-13 ASSESSMENT — ENCOUNTER SYMPTOMS
NUMBNESS: 1
DYSPNEA ON EXERTION: 1
PARESTHESIAS: 0
LIGHT-HEADEDNESS: 1
DIZZINESS: 0

## 2024-05-21 ENCOUNTER — HOSPITAL ENCOUNTER (OUTPATIENT)
Dept: CARDIOLOGY | Facility: HOSPITAL | Age: 79
Discharge: HOME | End: 2024-05-21
Payer: MEDICARE

## 2024-05-21 DIAGNOSIS — R55 SYNCOPE AND COLLAPSE: ICD-10-CM

## 2024-05-21 DIAGNOSIS — Z95.818 IMPLANTABLE LOOP RECORDER PRESENT: ICD-10-CM

## 2024-05-21 DIAGNOSIS — R55 SYNCOPE AND COLLAPSE: Primary | ICD-10-CM

## 2024-05-21 PROCEDURE — 93298 REM INTERROG DEV EVAL SCRMS: CPT

## 2024-05-23 ENCOUNTER — HOSPITAL ENCOUNTER (OUTPATIENT)
Dept: CARDIOLOGY | Facility: HOSPITAL | Age: 79
Discharge: HOME | End: 2024-05-23
Payer: MEDICARE

## 2024-05-23 DIAGNOSIS — R55 SYNCOPE AND COLLAPSE: ICD-10-CM

## 2024-07-09 ENCOUNTER — HOSPITAL ENCOUNTER (OUTPATIENT)
Dept: CARDIOLOGY | Facility: HOSPITAL | Age: 79
Discharge: HOME | End: 2024-07-09
Payer: MEDICARE

## 2024-07-09 DIAGNOSIS — Z95.818 IMPLANTABLE LOOP RECORDER PRESENT: ICD-10-CM

## 2024-07-09 DIAGNOSIS — R55 SYNCOPE AND COLLAPSE: ICD-10-CM

## 2024-07-10 ENCOUNTER — APPOINTMENT (OUTPATIENT)
Dept: CARDIOLOGY | Facility: HOSPITAL | Age: 79
End: 2024-07-10
Payer: MEDICARE

## 2024-07-10 ENCOUNTER — HOSPITAL ENCOUNTER (INPATIENT)
Facility: HOSPITAL | Age: 79
LOS: 2 days | Discharge: HOME | End: 2024-07-13
Attending: EMERGENCY MEDICINE | Admitting: STUDENT IN AN ORGANIZED HEALTH CARE EDUCATION/TRAINING PROGRAM
Payer: MEDICARE

## 2024-07-10 ENCOUNTER — APPOINTMENT (OUTPATIENT)
Dept: RADIOLOGY | Facility: HOSPITAL | Age: 79
End: 2024-07-10
Payer: MEDICARE

## 2024-07-10 DIAGNOSIS — J41.0 SIMPLE CHRONIC BRONCHITIS (MULTI): ICD-10-CM

## 2024-07-10 DIAGNOSIS — J18.9 PNEUMONIA DUE TO ORGANISM: Primary | ICD-10-CM

## 2024-07-10 DIAGNOSIS — R55 NEAR SYNCOPE: ICD-10-CM

## 2024-07-10 DIAGNOSIS — J18.9 COMMUNITY ACQUIRED PNEUMONIA OF RIGHT LOWER LOBE OF LUNG: ICD-10-CM

## 2024-07-10 LAB
ALBUMIN SERPL BCP-MCNC: 4 G/DL (ref 3.4–5)
ALP SERPL-CCNC: 85 U/L (ref 33–136)
ALT SERPL W P-5'-P-CCNC: 21 U/L (ref 10–52)
ANION GAP SERPL CALC-SCNC: 13 MMOL/L (ref 10–20)
APPEARANCE UR: CLEAR
AST SERPL W P-5'-P-CCNC: 16 U/L (ref 9–39)
BASOPHILS # BLD AUTO: 0.05 X10*3/UL (ref 0–0.1)
BASOPHILS NFR BLD AUTO: 0.3 %
BILIRUB SERPL-MCNC: 0.7 MG/DL (ref 0–1.2)
BILIRUB UR STRIP.AUTO-MCNC: NEGATIVE MG/DL
BUN SERPL-MCNC: 13 MG/DL (ref 6–23)
CALCIUM SERPL-MCNC: 9 MG/DL (ref 8.6–10.3)
CHLORIDE SERPL-SCNC: 102 MMOL/L (ref 98–107)
CO2 SERPL-SCNC: 23 MMOL/L (ref 21–32)
COLOR UR: YELLOW
CREAT SERPL-MCNC: 1.4 MG/DL (ref 0.5–1.3)
EGFRCR SERPLBLD CKD-EPI 2021: 51 ML/MIN/1.73M*2
EOSINOPHIL # BLD AUTO: 0.02 X10*3/UL (ref 0–0.4)
EOSINOPHIL NFR BLD AUTO: 0.1 %
ERYTHROCYTE [DISTWIDTH] IN BLOOD BY AUTOMATED COUNT: 11.7 % (ref 11.5–14.5)
GLUCOSE SERPL-MCNC: 189 MG/DL (ref 74–99)
GLUCOSE UR STRIP.AUTO-MCNC: ABNORMAL MG/DL
HCT VFR BLD AUTO: 45.4 % (ref 41–52)
HGB BLD-MCNC: 16.4 G/DL (ref 13.5–17.5)
HYALINE CASTS #/AREA URNS AUTO: ABNORMAL /LPF
IMM GRANULOCYTES # BLD AUTO: 0.12 X10*3/UL (ref 0–0.5)
IMM GRANULOCYTES NFR BLD AUTO: 0.7 % (ref 0–0.9)
KETONES UR STRIP.AUTO-MCNC: NEGATIVE MG/DL
LEUKOCYTE ESTERASE UR QL STRIP.AUTO: NEGATIVE
LYMPHOCYTES # BLD AUTO: 1.52 X10*3/UL (ref 0.8–3)
LYMPHOCYTES NFR BLD AUTO: 9.5 %
MAGNESIUM SERPL-MCNC: 1.69 MG/DL (ref 1.6–2.4)
MCH RBC QN AUTO: 33.3 PG (ref 26–34)
MCHC RBC AUTO-ENTMCNC: 36.1 G/DL (ref 32–36)
MCV RBC AUTO: 92 FL (ref 80–100)
MONOCYTES # BLD AUTO: 0.94 X10*3/UL (ref 0.05–0.8)
MONOCYTES NFR BLD AUTO: 5.9 %
MUCOUS THREADS #/AREA URNS AUTO: ABNORMAL /LPF
NEUTROPHILS # BLD AUTO: 13.41 X10*3/UL (ref 1.6–5.5)
NEUTROPHILS NFR BLD AUTO: 83.5 %
NITRITE UR QL STRIP.AUTO: NEGATIVE
NRBC BLD-RTO: 0 /100 WBCS (ref 0–0)
PH UR STRIP.AUTO: 5.5 [PH]
PHOSPHATE SERPL-MCNC: 2.7 MG/DL (ref 2.5–4.9)
PLATELET # BLD AUTO: 268 X10*3/UL (ref 150–450)
POTASSIUM SERPL-SCNC: 3.9 MMOL/L (ref 3.5–5.3)
PROT SERPL-MCNC: 7.3 G/DL (ref 6.4–8.2)
PROT UR STRIP.AUTO-MCNC: ABNORMAL MG/DL
RBC # BLD AUTO: 4.92 X10*6/UL (ref 4.5–5.9)
RBC # UR STRIP.AUTO: NEGATIVE /UL
RBC #/AREA URNS AUTO: ABNORMAL /HPF
SODIUM SERPL-SCNC: 134 MMOL/L (ref 136–145)
SP GR UR STRIP.AUTO: 1.01
UROBILINOGEN UR STRIP.AUTO-MCNC: NORMAL MG/DL
WBC # BLD AUTO: 16.1 X10*3/UL (ref 4.4–11.3)
WBC #/AREA URNS AUTO: ABNORMAL /HPF

## 2024-07-10 PROCEDURE — 71045 X-RAY EXAM CHEST 1 VIEW: CPT | Performed by: STUDENT IN AN ORGANIZED HEALTH CARE EDUCATION/TRAINING PROGRAM

## 2024-07-10 PROCEDURE — 83735 ASSAY OF MAGNESIUM: CPT | Performed by: EMERGENCY MEDICINE

## 2024-07-10 PROCEDURE — 81001 URINALYSIS AUTO W/SCOPE: CPT | Performed by: EMERGENCY MEDICINE

## 2024-07-10 PROCEDURE — 84100 ASSAY OF PHOSPHORUS: CPT | Performed by: EMERGENCY MEDICINE

## 2024-07-10 PROCEDURE — 99285 EMERGENCY DEPT VISIT HI MDM: CPT | Mod: 25

## 2024-07-10 PROCEDURE — 96361 HYDRATE IV INFUSION ADD-ON: CPT

## 2024-07-10 PROCEDURE — 80053 COMPREHEN METABOLIC PANEL: CPT | Performed by: EMERGENCY MEDICINE

## 2024-07-10 PROCEDURE — 93005 ELECTROCARDIOGRAM TRACING: CPT

## 2024-07-10 PROCEDURE — 85025 COMPLETE CBC W/AUTO DIFF WBC: CPT | Performed by: EMERGENCY MEDICINE

## 2024-07-10 PROCEDURE — 2500000004 HC RX 250 GENERAL PHARMACY W/ HCPCS (ALT 636 FOR OP/ED): Performed by: EMERGENCY MEDICINE

## 2024-07-10 PROCEDURE — 71045 X-RAY EXAM CHEST 1 VIEW: CPT

## 2024-07-10 PROCEDURE — 36415 COLL VENOUS BLD VENIPUNCTURE: CPT | Performed by: EMERGENCY MEDICINE

## 2024-07-10 ASSESSMENT — COLUMBIA-SUICIDE SEVERITY RATING SCALE - C-SSRS
1. IN THE PAST MONTH, HAVE YOU WISHED YOU WERE DEAD OR WISHED YOU COULD GO TO SLEEP AND NOT WAKE UP?: NO
2. HAVE YOU ACTUALLY HAD ANY THOUGHTS OF KILLING YOURSELF?: NO
6. HAVE YOU EVER DONE ANYTHING, STARTED TO DO ANYTHING, OR PREPARED TO DO ANYTHING TO END YOUR LIFE?: NO

## 2024-07-10 ASSESSMENT — PAIN DESCRIPTION - PROGRESSION: CLINICAL_PROGRESSION: NOT CHANGED

## 2024-07-10 ASSESSMENT — PAIN SCALES - GENERAL: PAINLEVEL_OUTOF10: 0 - NO PAIN

## 2024-07-10 ASSESSMENT — PAIN - FUNCTIONAL ASSESSMENT: PAIN_FUNCTIONAL_ASSESSMENT: 0-10

## 2024-07-11 PROBLEM — R73.9 HYPERGLYCEMIA: Status: ACTIVE | Noted: 2024-07-11

## 2024-07-11 PROBLEM — N17.9 AKI (ACUTE KIDNEY INJURY) (CMS-HCC): Status: ACTIVE | Noted: 2024-07-11

## 2024-07-11 PROBLEM — K57.90 DIVERTICULOSIS: Status: RESOLVED | Noted: 2018-05-28 | Resolved: 2024-07-11

## 2024-07-11 PROBLEM — I21.4 NSTEMI (NON-ST ELEVATED MYOCARDIAL INFARCTION) (MULTI): Status: RESOLVED | Noted: 2018-05-28 | Resolved: 2024-07-11

## 2024-07-11 PROBLEM — D72.829 LEUKOCYTOSIS: Status: ACTIVE | Noted: 2024-07-11

## 2024-07-11 PROBLEM — N40.0 BPH (BENIGN PROSTATIC HYPERPLASIA): Status: RESOLVED | Noted: 2018-05-28 | Resolved: 2024-07-11

## 2024-07-11 PROBLEM — J18.9 PNEUMONIA DUE TO ORGANISM: Status: ACTIVE | Noted: 2024-07-11

## 2024-07-11 LAB
ANION GAP SERPL CALC-SCNC: 10 MMOL/L (ref 10–20)
BUN SERPL-MCNC: 12 MG/DL (ref 6–23)
CALCIUM SERPL-MCNC: 8.8 MG/DL (ref 8.6–10.3)
CHLORIDE SERPL-SCNC: 102 MMOL/L (ref 98–107)
CO2 SERPL-SCNC: 27 MMOL/L (ref 21–32)
CREAT SERPL-MCNC: 1.12 MG/DL (ref 0.5–1.3)
EGFRCR SERPLBLD CKD-EPI 2021: 67 ML/MIN/1.73M*2
ERYTHROCYTE [DISTWIDTH] IN BLOOD BY AUTOMATED COUNT: 11.4 % (ref 11.5–14.5)
GLUCOSE SERPL-MCNC: 156 MG/DL (ref 74–99)
HCT VFR BLD AUTO: 39.8 % (ref 41–52)
HGB BLD-MCNC: 14.6 G/DL (ref 13.5–17.5)
HOLD SPECIMEN: NORMAL
LACTATE SERPL-SCNC: 1.6 MMOL/L (ref 0.4–2)
MCH RBC QN AUTO: 33.5 PG (ref 26–34)
MCHC RBC AUTO-ENTMCNC: 36.7 G/DL (ref 32–36)
MCV RBC AUTO: 91 FL (ref 80–100)
NRBC BLD-RTO: 0 /100 WBCS (ref 0–0)
PLATELET # BLD AUTO: 229 X10*3/UL (ref 150–450)
POTASSIUM SERPL-SCNC: 4 MMOL/L (ref 3.5–5.3)
RBC # BLD AUTO: 4.36 X10*6/UL (ref 4.5–5.9)
SODIUM SERPL-SCNC: 135 MMOL/L (ref 136–145)
WBC # BLD AUTO: 12.4 X10*3/UL (ref 4.4–11.3)

## 2024-07-11 PROCEDURE — 36415 COLL VENOUS BLD VENIPUNCTURE: CPT | Performed by: EMERGENCY MEDICINE

## 2024-07-11 PROCEDURE — 99223 1ST HOSP IP/OBS HIGH 75: CPT | Performed by: STUDENT IN AN ORGANIZED HEALTH CARE EDUCATION/TRAINING PROGRAM

## 2024-07-11 PROCEDURE — 2500000004 HC RX 250 GENERAL PHARMACY W/ HCPCS (ALT 636 FOR OP/ED): Performed by: EMERGENCY MEDICINE

## 2024-07-11 PROCEDURE — 85027 COMPLETE CBC AUTOMATED: CPT | Performed by: STUDENT IN AN ORGANIZED HEALTH CARE EDUCATION/TRAINING PROGRAM

## 2024-07-11 PROCEDURE — 83605 ASSAY OF LACTIC ACID: CPT | Performed by: EMERGENCY MEDICINE

## 2024-07-11 PROCEDURE — 1200000002 HC GENERAL ROOM WITH TELEMETRY DAILY

## 2024-07-11 PROCEDURE — 87040 BLOOD CULTURE FOR BACTERIA: CPT | Mod: PORLAB | Performed by: EMERGENCY MEDICINE

## 2024-07-11 PROCEDURE — 96365 THER/PROPH/DIAG IV INF INIT: CPT

## 2024-07-11 PROCEDURE — 2500000001 HC RX 250 WO HCPCS SELF ADMINISTERED DRUGS (ALT 637 FOR MEDICARE OP): Performed by: STUDENT IN AN ORGANIZED HEALTH CARE EDUCATION/TRAINING PROGRAM

## 2024-07-11 PROCEDURE — 2500000004 HC RX 250 GENERAL PHARMACY W/ HCPCS (ALT 636 FOR OP/ED): Performed by: STUDENT IN AN ORGANIZED HEALTH CARE EDUCATION/TRAINING PROGRAM

## 2024-07-11 PROCEDURE — 87899 AGENT NOS ASSAY W/OPTIC: CPT | Mod: PORLAB | Performed by: STUDENT IN AN ORGANIZED HEALTH CARE EDUCATION/TRAINING PROGRAM

## 2024-07-11 PROCEDURE — 2500000004 HC RX 250 GENERAL PHARMACY W/ HCPCS (ALT 636 FOR OP/ED): Performed by: INTERNAL MEDICINE

## 2024-07-11 PROCEDURE — 87449 NOS EACH ORGANISM AG IA: CPT | Mod: PORLAB | Performed by: STUDENT IN AN ORGANIZED HEALTH CARE EDUCATION/TRAINING PROGRAM

## 2024-07-11 PROCEDURE — 80048 BASIC METABOLIC PNL TOTAL CA: CPT | Performed by: STUDENT IN AN ORGANIZED HEALTH CARE EDUCATION/TRAINING PROGRAM

## 2024-07-11 PROCEDURE — 99233 SBSQ HOSP IP/OBS HIGH 50: CPT | Performed by: INTERNAL MEDICINE

## 2024-07-11 PROCEDURE — 2500000001 HC RX 250 WO HCPCS SELF ADMINISTERED DRUGS (ALT 637 FOR MEDICARE OP): Performed by: INTERNAL MEDICINE

## 2024-07-11 RX ORDER — BISACODYL 5 MG
10 TABLET, DELAYED RELEASE (ENTERIC COATED) ORAL DAILY PRN
Status: DISCONTINUED | OUTPATIENT
Start: 2024-07-11 | End: 2024-07-13 | Stop reason: HOSPADM

## 2024-07-11 RX ORDER — HYDRALAZINE HYDROCHLORIDE 50 MG/1
50 TABLET, FILM COATED ORAL 2 TIMES DAILY
Status: DISCONTINUED | OUTPATIENT
Start: 2024-07-11 | End: 2024-07-13 | Stop reason: HOSPADM

## 2024-07-11 RX ORDER — AMLODIPINE BESYLATE 5 MG/1
5 TABLET ORAL DAILY
Status: DISCONTINUED | OUTPATIENT
Start: 2024-07-11 | End: 2024-07-13 | Stop reason: HOSPADM

## 2024-07-11 RX ORDER — SODIUM CHLORIDE 9 MG/ML
75 INJECTION, SOLUTION INTRAVENOUS CONTINUOUS
Status: ACTIVE | OUTPATIENT
Start: 2024-07-11 | End: 2024-07-11

## 2024-07-11 RX ORDER — ONDANSETRON 4 MG/1
4 TABLET, FILM COATED ORAL EVERY 8 HOURS PRN
Status: DISCONTINUED | OUTPATIENT
Start: 2024-07-11 | End: 2024-07-13 | Stop reason: HOSPADM

## 2024-07-11 RX ORDER — PREDNISONE 20 MG/1
40 TABLET ORAL DAILY
Status: DISCONTINUED | OUTPATIENT
Start: 2024-07-11 | End: 2024-07-13 | Stop reason: HOSPADM

## 2024-07-11 RX ORDER — TALC
3 POWDER (GRAM) TOPICAL NIGHTLY PRN
Status: DISCONTINUED | OUTPATIENT
Start: 2024-07-11 | End: 2024-07-13 | Stop reason: HOSPADM

## 2024-07-11 RX ORDER — NAPROXEN SODIUM 220 MG/1
81 TABLET, FILM COATED ORAL DAILY
Status: DISCONTINUED | OUTPATIENT
Start: 2024-07-11 | End: 2024-07-13 | Stop reason: HOSPADM

## 2024-07-11 RX ORDER — GUAIFENESIN 600 MG/1
600 TABLET, EXTENDED RELEASE ORAL EVERY 12 HOURS PRN
Status: DISCONTINUED | OUTPATIENT
Start: 2024-07-11 | End: 2024-07-13 | Stop reason: HOSPADM

## 2024-07-11 RX ORDER — BISACODYL 10 MG/1
10 SUPPOSITORY RECTAL DAILY PRN
Status: DISCONTINUED | OUTPATIENT
Start: 2024-07-11 | End: 2024-07-13 | Stop reason: HOSPADM

## 2024-07-11 RX ORDER — CEFTRIAXONE 2 G/50ML
2 INJECTION, SOLUTION INTRAVENOUS EVERY 24 HOURS
Status: DISCONTINUED | OUTPATIENT
Start: 2024-07-12 | End: 2024-07-13 | Stop reason: HOSPADM

## 2024-07-11 RX ORDER — IPRATROPIUM BROMIDE AND ALBUTEROL SULFATE 2.5; .5 MG/3ML; MG/3ML
3 SOLUTION RESPIRATORY (INHALATION)
Status: DISCONTINUED | OUTPATIENT
Start: 2024-07-11 | End: 2024-07-11

## 2024-07-11 RX ORDER — PANTOPRAZOLE SODIUM 40 MG/10ML
40 INJECTION, POWDER, LYOPHILIZED, FOR SOLUTION INTRAVENOUS DAILY
Status: DISCONTINUED | OUTPATIENT
Start: 2024-07-11 | End: 2024-07-13 | Stop reason: HOSPADM

## 2024-07-11 RX ORDER — ATORVASTATIN CALCIUM 40 MG/1
40 TABLET, FILM COATED ORAL NIGHTLY
Status: DISCONTINUED | OUTPATIENT
Start: 2024-07-11 | End: 2024-07-13 | Stop reason: HOSPADM

## 2024-07-11 RX ORDER — PANTOPRAZOLE SODIUM 40 MG/1
40 TABLET, DELAYED RELEASE ORAL DAILY
Status: DISCONTINUED | OUTPATIENT
Start: 2024-07-11 | End: 2024-07-13 | Stop reason: HOSPADM

## 2024-07-11 RX ORDER — ONDANSETRON HYDROCHLORIDE 2 MG/ML
4 INJECTION, SOLUTION INTRAVENOUS EVERY 8 HOURS PRN
Status: DISCONTINUED | OUTPATIENT
Start: 2024-07-11 | End: 2024-07-13 | Stop reason: HOSPADM

## 2024-07-11 RX ORDER — CEFTRIAXONE 2 G/50ML
2 INJECTION, SOLUTION INTRAVENOUS ONCE
Status: COMPLETED | OUTPATIENT
Start: 2024-07-11 | End: 2024-07-11

## 2024-07-11 RX ORDER — HYDRALAZINE HYDROCHLORIDE 20 MG/ML
10 INJECTION INTRAMUSCULAR; INTRAVENOUS EVERY 6 HOURS PRN
Status: DISCONTINUED | OUTPATIENT
Start: 2024-07-11 | End: 2024-07-13 | Stop reason: HOSPADM

## 2024-07-11 RX ORDER — IPRATROPIUM BROMIDE AND ALBUTEROL SULFATE 2.5; .5 MG/3ML; MG/3ML
3 SOLUTION RESPIRATORY (INHALATION) EVERY 2 HOUR PRN
Status: DISCONTINUED | OUTPATIENT
Start: 2024-07-11 | End: 2024-07-13 | Stop reason: HOSPADM

## 2024-07-11 SDOH — SOCIAL STABILITY: SOCIAL INSECURITY: WERE YOU ABLE TO COMPLETE ALL THE BEHAVIORAL HEALTH SCREENINGS?: YES

## 2024-07-11 SDOH — SOCIAL STABILITY: SOCIAL INSECURITY: HAVE YOU HAD THOUGHTS OF HARMING ANYONE ELSE?: NO

## 2024-07-11 ASSESSMENT — ENCOUNTER SYMPTOMS
FREQUENCY: 0
FACIAL ASYMMETRY: 0
CONSTIPATION: 0
STRIDOR: 0
COUGH: 1
CHEST TIGHTNESS: 0
FEVER: 0
ACTIVITY CHANGE: 0
MYALGIAS: 0
ARTHRALGIAS: 0
SORE THROAT: 0
DIZZINESS: 0
DECREASED CONCENTRATION: 0
VOMITING: 0
BACK PAIN: 0
DIFFICULTY URINATING: 0
SEIZURES: 0
FLANK PAIN: 0
AGITATION: 0
SPEECH DIFFICULTY: 0
NAUSEA: 0
CONFUSION: 0
APNEA: 0
NUMBNESS: 0
WEAKNESS: 0
ABDOMINAL DISTENTION: 0
FATIGUE: 0
DIAPHORESIS: 0
WHEEZING: 0
WOUND: 0
CHILLS: 0
HEMATURIA: 0
APPETITE CHANGE: 0
SHORTNESS OF BREATH: 1
LIGHT-HEADEDNESS: 1
NERVOUS/ANXIOUS: 0
TREMORS: 0
SINUS PAIN: 0
JOINT SWELLING: 0
COLOR CHANGE: 0
PALPITATIONS: 0
ABDOMINAL PAIN: 0
DYSURIA: 0
DIARRHEA: 0
RHINORRHEA: 0
HEADACHES: 0

## 2024-07-11 ASSESSMENT — COGNITIVE AND FUNCTIONAL STATUS - GENERAL
DAILY ACTIVITIY SCORE: 24
MOBILITY SCORE: 24
DAILY ACTIVITIY SCORE: 24
PATIENT BASELINE BEDBOUND: NO
MOBILITY SCORE: 24
DAILY ACTIVITIY SCORE: 24
MOBILITY SCORE: 24

## 2024-07-11 ASSESSMENT — ACTIVITIES OF DAILY LIVING (ADL)
PATIENT'S MEMORY ADEQUATE TO SAFELY COMPLETE DAILY ACTIVITIES?: YES
WALKS IN HOME: INDEPENDENT
PATIENT'S MEMORY ADEQUATE TO SAFELY COMPLETE DAILY ACTIVITIES?: YES
HEARING - LEFT EAR: FUNCTIONAL
GROOMING: INDEPENDENT
ADEQUATE_TO_COMPLETE_ADL: YES
BATHING: INDEPENDENT
DRESSING YOURSELF: INDEPENDENT
ASSISTIVE_DEVICE: EYEGLASSES
ADEQUATE_TO_COMPLETE_ADL: YES
TOILETING: INDEPENDENT
BATHING: INDEPENDENT
TOILETING: INDEPENDENT
LACK_OF_TRANSPORTATION: NO
GROOMING: INDEPENDENT
HEARING - LEFT EAR: FUNCTIONAL
JUDGMENT_ADEQUATE_SAFELY_COMPLETE_DAILY_ACTIVITIES: YES
HEARING - RIGHT EAR: FUNCTIONAL
FEEDING YOURSELF: INDEPENDENT
FEEDING YOURSELF: INDEPENDENT
DRESSING YOURSELF: INDEPENDENT
WALKS IN HOME: INDEPENDENT
JUDGMENT_ADEQUATE_SAFELY_COMPLETE_DAILY_ACTIVITIES: YES
HEARING - RIGHT EAR: FUNCTIONAL

## 2024-07-11 ASSESSMENT — LIFESTYLE VARIABLES
PRESCIPTION_ABUSE_PAST_12_MONTHS: NO
HOW OFTEN DO YOU HAVE 6 OR MORE DRINKS ON ONE OCCASION: NEVER
HOW MANY STANDARD DRINKS CONTAINING ALCOHOL DO YOU HAVE ON A TYPICAL DAY: PATIENT DOES NOT DRINK
SKIP TO QUESTIONS 9-10: 1
SUBSTANCE_ABUSE_PAST_12_MONTHS: NO
AUDIT-C TOTAL SCORE: 0
HOW OFTEN DO YOU HAVE A DRINK CONTAINING ALCOHOL: NEVER
AUDIT-C TOTAL SCORE: 0

## 2024-07-11 ASSESSMENT — PATIENT HEALTH QUESTIONNAIRE - PHQ9
SUM OF ALL RESPONSES TO PHQ9 QUESTIONS 1 & 2: 0
1. LITTLE INTEREST OR PLEASURE IN DOING THINGS: NOT AT ALL
2. FEELING DOWN, DEPRESSED OR HOPELESS: NOT AT ALL

## 2024-07-11 ASSESSMENT — PAIN SCALES - GENERAL
PAINLEVEL_OUTOF10: 0 - NO PAIN
PAINLEVEL_OUTOF10: 0 - NO PAIN
PAINLEVEL_OUTOF10: 1
PAINLEVEL_OUTOF10: 0 - NO PAIN
PAINLEVEL_OUTOF10: 0 - NO PAIN

## 2024-07-11 NOTE — H&P
[facility] - GENERAL MEDICINE HISTORY AND PHYSICAL    History Obtained From: Pt    History Of Present Illness:  Darius Roe is a 79 y.o. male with PMHx s/f Afib on Eliquis, aortic regurgitation, HTN, BPV, CAD, diverticulosis, emphysema, HLD, CHRISTINE presenting with lightheadedness.  Patient had an episode of lightheadedness while playing horseshoes tonight and was brought in for evaluation. He has these chronic episodes of lightheadedness and hypotension when standing up.  He does see cardiology for these and has had implantable loop recorder the past several years.  His loop recorder has been unable to find anything besides occasional sinus pauses during the night associated with apneic events when he is not wearing his CPAP.  Cardiology has recommended compression stockings. He says these symptoms have been worsening over the past several days.  They first occurred on Saturday and then resolved and then reoccurred tonight.  He does say they are accompanied by shortness of breath and some dry coughing.  He denies chest pain, syncope, nausea, vomiting, abdominal pain or bowel or bladder issues currently.  He has not fallen or hit his head.  No vision or hearing changes.  No sick contacts, general malaise, or fever or chills.    ED Course (Summary):   Vitals on presentation: 97.9 F, 79 bpm, 17 rr, 152/83 --> 181/86, 94% on RA  Labs: CMP glu 189, Na 134, Cr 1.40 (baseline WNL)  Mag 1.69  Lactate 1.6  CBC WBC 16.1, Hg 16.4, Plt 268  UA trace glucose  Imaging: CXR - Bibasilar bandlike subsegmental opacities favoring to represent   atelectasis, however infection is not excluded.   EKG - Sinus rhythm at 79 bpm.  Interventions: Rocephin and azithromycin, LR 1 L bolus, Pt will be admitted for pneumonia.    ED Course (From Provider):  ED Course as of 07/11/24 0344   Wed Jul 10, 2024   2142 CBC with leukocytosis to 16.  This is neutrophilic without bandemia.  While demargination is a consideration occult  infection is also considered.  The patient has no chest pain, shortness of breath, cough, congestion.  No abdominal symptoms other than nausea isolated to his episode of hypotension. [SP]   2157 CMP shows mildly elevated creatinine at 1.4.  The patient's baseline does appear to be 0.9.  Electrolytes are within normal limits. [SP]   2157 EKG performed at 2125 and interpreted by me shows sinus rhythm at a rate of 79.  Intervals are normal.  There is left axis deviation.  There are no ST elevations or depressions.  No T wave changes.  No STEMI.EKG is unchanged from EKG of September 2, 2023. [SP]   2355 Urinalysis without evidence of infection. [SP]      ED Course User Index  [SP] Marilia Dueñas DO         Diagnoses as of 07/11/24 0344   Near syncope   Pneumonia due to organism   Community acquired pneumonia of right lower lobe of lung     Relevant Results  Results for orders placed or performed during the hospital encounter of 07/10/24 (from the past 24 hour(s))   CBC and Auto Differential   Result Value Ref Range    WBC 16.1 (H) 4.4 - 11.3 x10*3/uL    nRBC 0.0 0.0 - 0.0 /100 WBCs    RBC 4.92 4.50 - 5.90 x10*6/uL    Hemoglobin 16.4 13.5 - 17.5 g/dL    Hematocrit 45.4 41.0 - 52.0 %    MCV 92 80 - 100 fL    MCH 33.3 26.0 - 34.0 pg    MCHC 36.1 (H) 32.0 - 36.0 g/dL    RDW 11.7 11.5 - 14.5 %    Platelets 268 150 - 450 x10*3/uL    Neutrophils % 83.5 40.0 - 80.0 %    Immature Granulocytes %, Automated 0.7 0.0 - 0.9 %    Lymphocytes % 9.5 13.0 - 44.0 %    Monocytes % 5.9 2.0 - 10.0 %    Eosinophils % 0.1 0.0 - 6.0 %    Basophils % 0.3 0.0 - 2.0 %    Neutrophils Absolute 13.41 (H) 1.60 - 5.50 x10*3/uL    Immature Granulocytes Absolute, Automated 0.12 0.00 - 0.50 x10*3/uL    Lymphocytes Absolute 1.52 0.80 - 3.00 x10*3/uL    Monocytes Absolute 0.94 (H) 0.05 - 0.80 x10*3/uL    Eosinophils Absolute 0.02 0.00 - 0.40 x10*3/uL    Basophils Absolute 0.05 0.00 - 0.10 x10*3/uL   Magnesium   Result Value Ref Range    Magnesium 1.69 1.60 -  2.40 mg/dL   Phosphorus   Result Value Ref Range    Phosphorus 2.7 2.5 - 4.9 mg/dL   Comprehensive metabolic panel   Result Value Ref Range    Glucose 189 (H) 74 - 99 mg/dL    Sodium 134 (L) 136 - 145 mmol/L    Potassium 3.9 3.5 - 5.3 mmol/L    Chloride 102 98 - 107 mmol/L    Bicarbonate 23 21 - 32 mmol/L    Anion Gap 13 10 - 20 mmol/L    Urea Nitrogen 13 6 - 23 mg/dL    Creatinine 1.40 (H) 0.50 - 1.30 mg/dL    eGFR 51 (L) >60 mL/min/1.73m*2    Calcium 9.0 8.6 - 10.3 mg/dL    Albumin 4.0 3.4 - 5.0 g/dL    Alkaline Phosphatase 85 33 - 136 U/L    Total Protein 7.3 6.4 - 8.2 g/dL    AST 16 9 - 39 U/L    Bilirubin, Total 0.7 0.0 - 1.2 mg/dL    ALT 21 10 - 52 U/L   Urinalysis with Reflex Culture and Microscopic   Result Value Ref Range    Color, Urine Yellow Light-Yellow, Yellow, Dark-Yellow    Appearance, Urine Clear Clear    Specific Gravity, Urine 1.014 1.005 - 1.035    pH, Urine 5.5 5.0, 5.5, 6.0, 6.5, 7.0, 7.5, 8.0    Protein, Urine 20 (TRACE) NEGATIVE, 10 (TRACE), 20 (TRACE) mg/dL    Glucose, Urine 30 (TRACE) (A) Normal mg/dL    Blood, Urine NEGATIVE NEGATIVE    Ketones, Urine NEGATIVE NEGATIVE mg/dL    Bilirubin, Urine NEGATIVE NEGATIVE    Urobilinogen, Urine Normal Normal mg/dL    Nitrite, Urine NEGATIVE NEGATIVE    Leukocyte Esterase, Urine NEGATIVE NEGATIVE   Urinalysis Microscopic   Result Value Ref Range    WBC, Urine 1-5 1-5, NONE /HPF    RBC, Urine 1-2 NONE, 1-2, 3-5 /HPF    Mucus, Urine FEW Reference range not established. /LPF    Hyaline Casts, Urine 3+ (A) NONE /LPF   Lactate   Result Value Ref Range    Lactate 1.6 0.4 - 2.0 mmol/L      XR chest 1 view    Result Date: 7/11/2024  Interpreted By:  Abram Sibley, STUDY: XR CHEST 1 VIEW;  7/10/2024 11:47 pm   INDICATION: Signs/Symptoms:leukocytosis, weakness.   COMPARISON: Chest radiograph 09/02/2023 and chest CT 05/28/2018.   ACCESSION NUMBER(S): [id number]   ORDERING CLINICIAN: KRAIG KANG   FINDINGS:   CARDIOMEDIASTINAL SILHOUETTE: Cardiomediastinal  silhouette is normal in size and configuration. There is a left chest wall cardiac loop recorder.   LUNGS/PLEURA: There are bibasilar bandlike subsegmental opacities favoring to represent atelectasis, however infection is not excluded..There are no pleural effusions. There is no demonstrated pneumothorax.     BONES: No evidence of acute osseous abnormality.       1. Bibasilar bandlike subsegmental opacities favoring to represent atelectasis, however infection is not excluded.     Signed by: Abram Sibley 7/11/2024 12:56 AM Dictation workstation:   [id number]    Scheduled medications:  amLODIPine, 5 mg, oral, Daily  apixaban, 5 mg, oral, BID  aspirin, 81 mg, oral, Daily  atorvastatin, 40 mg, oral, Nightly  [START ON 7/12/2024] azithromycin, 500 mg, intravenous, q24h  [START ON 7/12/2024] cefTRIAXone, 2 g, intravenous, q24h  [Held by provider] losartan 50 mg, hydroCHLOROthiazide 12.5 mg for Hyzaar 50/12.5, , oral, Daily  pantoprazole, 40 mg, oral, Daily   Or  pantoprazole, 40 mg, intravenous, Daily      Continuous medications:  sodium chloride 0.9%, 75 mL/hr      PRN medications:  PRN medications: bisacodyl, bisacodyl, guaiFENesin, melatonin, ondansetron **OR** ondansetron     Past Medical History  He has a past medical history of 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    Surgical History  He has a past surgical history that includes Coronary angioplasty (06/12/2018); Other surgical history (07/16/2020); CT angio neck (11/9/2021); MR angio neck w and wo IV contrast (11/10/2021); and CT angio head w and wo IV contrast (11/10/2021).     Social History  He reports that he has been smoking cigars. He has never used smokeless tobacco. He reports that he does not currently use  alcohol after a past usage of about 1.0 standard drink of alcohol per week. He reports that he does not use drugs.    Family History  Family History   Problem Relation Name Age of Onset    Other (malignant neoplasm of ovary) Mother      Heart attack Mother      Other (cerebrovascular accident) Father      Other (diabetes mellitus) Father      Lung cancer Father      Other (metastatic to brain) Father      Pancreatic cancer Sister      Liver cancer Brother      Other (malignant neoplasm of prostate) Brother      Other (primary cervical cancer) Maternal Grandmother         Allergies  Ciprofloxacin    Code Status  Full Code     Review of Systems   Constitutional:  Negative for activity change, appetite change, chills, diaphoresis, fatigue and fever.   HENT:  Negative for congestion, ear pain, rhinorrhea, sinus pain and sore throat.    Respiratory:  Positive for cough and shortness of breath. Negative for apnea, chest tightness, wheezing and stridor.    Cardiovascular:  Negative for chest pain, palpitations and leg swelling.   Gastrointestinal:  Negative for abdominal distention, abdominal pain, constipation, diarrhea, nausea and vomiting.   Genitourinary:  Negative for difficulty urinating, dysuria, flank pain, frequency, hematuria and urgency.   Musculoskeletal:  Negative for arthralgias, back pain, gait problem, joint swelling and myalgias.   Skin:  Negative for color change, pallor, rash and wound.   Neurological:  Positive for light-headedness. Negative for dizziness, tremors, seizures, syncope, facial asymmetry, speech difficulty, weakness, numbness and headaches.   Psychiatric/Behavioral:  Negative for agitation, behavioral problems, confusion and decreased concentration. The patient is not nervous/anxious.    All other systems reviewed and are negative.      Last Recorded Vitals  BP (!) 181/86 (BP Location: Left arm, Patient Position: Sitting)   Pulse 80   Temp 36.6 °C (97.9 °F) (Temporal)   Resp 16   Wt  74.8 kg (165 lb)   SpO2 (!) 93%      Physical Exam:  Vital signs and nursing notes reviewed.   Constitutional: Pleasant and cooperative. Laying in bed in no acute distress. Conversant.   Skin: Warm and dry; no obvious lesions, rashes, pallor, or jaundice. Good turgor.   Eyes: EOMI. Anicteric sclera.   ENT: Mucous membranes moist; no obvious injury or deformity appreciated.   Head and Neck: Normocephalic, atraumatic. ROM preserved. Trachea midline. No appreciable JVD.   Respiratory: Nonlabored on RA. Lungs clear to auscultation bilaterally without obvious adventitious sounds. Chest rise is equal.  Cardiovascular: RRR. No gross murmur, gallop, or rub. Extremities are warm and well-perfused with good capillary refill (< 3 seconds). No chest wall tenderness.   GI: Abdomen soft, nontender, nondistended. No obvious organomegaly appreciated. Bowel sounds are present and normoactive.  : No CVA tenderness.   MSK: No gross abnormalities appreciated. No limitations to AROM/PROM appreciated.   Extremities: No cyanosis, edema, or clubbing evident. Neurovascularly intact.   Neuro: A&Ox3. CN 2-12 grossly intact. Able to respond to questions appropriately and clearly. No acute focal neurologic deficits appreciated.  Psych: Appropriate mood and behavior.    Assessment/Plan   Principal Problem:    Pneumonia due to organism  Active Problems:    CAD (coronary artery disease)    A-fib (Multi)    Benign essential hypertension    Hyperlipidemia    Obstructive sleep apnea of adult    Near syncope    Hyperglycemia    SHANTA (acute kidney injury) (CMS-[facility])    Leukocytosis    Plan:  Admit to gen med    Pneumonia:  Rocephin and azithromycin  Check strep pneumo and legionella antigen  No signs of sepsis on admission  Currently on room air.    SHANTA:  Gentle hydration  Dehydration also likely contributing to pre-syncopal symptoms  Hold home Cozaar/hydrochlorothiazide    HTN:  Home Norvasc    HLD/Cad:  Home statin, ASA    Hx afib:  Home  Eliquis    Diet: Regular  DVT Prophylaxis: Home Eliquis  Code Status: Full code per discussion with pt.     DO Rickey Dougherty dictation software was used to dictate this note and thus there may be minor errors in translation/transcription including garbled speech or misspellings. Please contact for clarification if needed.

## 2024-07-11 NOTE — ED TRIAGE NOTES
Pt states he at [employer] where he became lightheaded. Pt was helped to a bench where he felt like passing out. A nurse who was at the league took Pt's BP and was found to be low.

## 2024-07-11 NOTE — PROGRESS NOTES
Darius Roe is a 79 y.o. male on day 0 of admission presenting with Pneumonia due to organism.      Subjective   Darius Roe is a 79 y.o. male with PMHx s/f Afib on Eliquis, aortic regurgitation, HTN, BPV, CAD, diverticulosis, emphysema, HLD, CHRISTINE presenting with lightheadedness.  Patient had an episode of lightheadedness while playing horseshoes tonight and was brought in for evaluation. He has these chronic episodes of lightheadedness and hypotension when standing up.  He does see cardiology for these and has had implantable loop recorder the past several years.  His loop recorder has been unable to find anything besides occasional sinus pauses during the night associated with apneic events when he is not wearing his CPAP.  Cardiology has recommended compression stockings. He says these symptoms have been worsening over the past several days.  They first occurred on Saturday and then resolved and then reoccurred tonight.  He does say they are accompanied by shortness of breath and some dry coughing.  He denies chest pain, syncope, nausea, vomiting, abdominal pain or bowel or bladder issues currently.  He has not fallen or hit his head.  No vision or hearing changes.  No sick contacts, general malaise, or fever or chills.     ED Course (Summary):   Vitals on presentation: 97.9 F, 79 bpm, 17 rr, 152/83 --> 181/86, 94% on RA  Labs: CMP glu 189, Na 134, Cr 1.40 (baseline WNL)  Mag 1.69  Lactate 1.6  CBC WBC 16.1, Hg 16.4, Plt 268  UA trace glucose  Imaging: CXR - Bibasilar bandlike subsegmental opacities favoring to represent   atelectasis, however infection is not excluded.   EKG - Sinus rhythm at 79 bpm.  Interventions: Rocephin and azithromycin, LR 1 L bolus, Pt will be admitted for pneumonia.  7/11: Patient was seen and examined.  He is requiring 2 L nasal cannula oxygen to maintain saturation.  Admits to longtime smoking history.  Possible undiagnosed COPD I will add Solu-Medrol and continue current  antibiotics and breathing treatments.  Continue current IV antibiotics.  Blood cultures are pending.  Urine Legionella and strep antigen also pending.  SHANTA much improved.  Orthostatic blood pressure completed and negative.  Wean oxygen as tolerated.  Repeat CBC and BMP in AM.       Objective     Last Recorded Vitals  BP (!) 183/96 (BP Location: Right arm, Patient Position: Standing)   Pulse 84   Temp 36.2 °C (97.2 °F) (Temporal)   Resp 20   Wt 74.8 kg (165 lb)   SpO2 90%   Intake/Output last 3 Shifts:    Intake/Output Summary (Last 24 hours) at 7/11/2024 1424  Last data filed at 7/11/2024 1326  Gross per 24 hour   Intake 2253.75 ml   Output --   Net 2253.75 ml       Admission Weight  Weight: 74.8 kg (165 lb) (07/10/24 2125)    Daily Weight  07/11/24 : 74.8 kg (165 lb)    Image Results  ECG 12 lead  Sinus rhythm  Inferior infarct, old  Consider anterior infarct  XR chest 1 view  Narrative: Interpreted By:  Abram Sibley,   STUDY:  XR CHEST 1 VIEW;  7/10/2024 11:47 pm      INDICATION:  Signs/Symptoms:leukocytosis, weakness.      COMPARISON:  Chest radiograph 09/02/2023 and chest CT 05/28/2018.      ACCESSION NUMBER(S):  [id number]      ORDERING CLINICIAN:  KRAIG KANG      FINDINGS:      CARDIOMEDIASTINAL SILHOUETTE:  Cardiomediastinal silhouette is normal in size and configuration.  There is a left chest wall cardiac loop recorder.      LUNGS/PLEURA:  There are bibasilar bandlike subsegmental opacities favoring to  represent atelectasis, however infection is not excluded..There are  no pleural effusions. There is no demonstrated pneumothorax.          BONES: No evidence of acute osseous abnormality.      Impression: 1. Bibasilar bandlike subsegmental opacities favoring to represent  atelectasis, however infection is not excluded.          Signed by: Abram Sibley 7/11/2024 12:56 AM  Dictation workstation:   [id number]      Physical Exam  Constitutional: Pleasant and cooperative. Laying in bed in no acute  distress. Conversant.   Skin: Warm and dry; no obvious lesions, rashes, pallor, or jaundice. Good turgor.   Eyes: EOMI. Anicteric sclera.   ENT: Mucous membranes moist; no obvious injury or deformity appreciated.   Head and Neck: Normocephalic, atraumatic. ROM preserved. Trachea midline. No appreciable JVD.   Respiratory: Nonlabored on RA. Lungs clear to auscultation bilaterally without obvious adventitious sounds. Chest rise is equal.  Cardiovascular: RRR. No gross murmur, gallop, or rub. Extremities are warm and well-perfused with good capillary refill (< 3 seconds). No chest wall tenderness.   GI: Abdomen soft, nontender, nondistended. No obvious organomegaly appreciated. Bowel sounds are present and normoactive.  : No CVA tenderness.   MSK: No gross abnormalities appreciated. No limitations to AROM/PROM appreciated.   Extremities: No cyanosis, edema, or clubbing evident. Neurovascularly intact.   Neuro: A&Ox3. CN 2-12 grossly intact. Able to respond to questions appropriately and clearly. No acute focal neurologic deficits appreciated.  Psych: Appropriate mood and behavior.     Relevant Results               Assessment/Plan                  Principal Problem:    Pneumonia due to organism  Active Problems:    CAD (coronary artery disease)    A-fib (Multi)    Benign essential hypertension    Hyperlipidemia    Obstructive sleep apnea of adult    Near syncope    Hyperglycemia    SHANTA (acute kidney injury) (CMS-[facility])    Leukocytosis    Pneumonia:  Rocephin and azithromycin  Check strep pneumo and legionella antigen  No signs of sepsis on admission    COPD exacerbation  Breathing treatment with DuoNeb  P.o. prednisone  Continue antibiotics as above      Acute hypoxic respiratory failure  Likely due to 1 and 2 above  Continue nasal cannula oxygen and wean as tolerated       SHANTA:  Resolved.  Gentle hydration  Dehydration also likely contributing to pre-syncopal symptoms  Hold home Cozaar/hydrochlorothiazide      HTN:  Resume home amlodipine  Add as needed hydralazine      HLD/Cad:  Home statin, ASA     Hx afib:  Home Eliquis     Diet: Regular  DVT Prophylaxis: Home Eliquis  Code Status: Full code per discussion with pt.          Spent 35 minutes in the follow-up management of this patient today       Bernadette Clemens MD

## 2024-07-11 NOTE — PROGRESS NOTES
Darius Roe is a 79 y.o. male admitted for Pneumonia due to organism. Pharmacy reviewed the patient's pthtn-zl-iscbralve medications and allergies for accuracy.    The list below reflects the PTA list prior to pharmacy medication history. A summary a changes to the PTA medication list has been listed below. Please review each medication in order reconciliation for additional clarification and justification.    Source of information:  T2P    Medications added:    Medications modified:  Vitamin D3 25mcg --> 25mcg every day     Medications to be removed:  Albuterol HFA 90mcg    Medications of concern:      Prior to Admission Medications   Prescriptions Last Dose Informant Patient Reported? Taking?   albuterol 90 mcg/actuation inhaler   Yes No   Sig: Inhale 2 puffs 4 times a day as needed.   amLODIPine (Norvasc) 5 mg tablet   Yes No   Sig: Take 1 tablet (5 mg) by mouth once daily.   apixaban (Eliquis) 5 mg tablet   Yes No   Sig: Take 1 tablet (5 mg) by mouth 2 times a day.  AS DIRECTED BY THE [facility] ([phone] EXT. [phone])   aspirin 81 mg chewable tablet   Yes No   Sig: Chew 1 tablet (81 mg) once daily.   atorvastatin (Lipitor) 40 mg tablet   No No   Sig: Take 1 tablet (40 mg) by mouth once daily at bedtime.   cholecalciferol (Vitamin D-3) 25 MCG (1000 UT) capsule   Yes No   Sig: Take by mouth. Take as directed   losartan-hydrochlorothiazide (Hyzaar) 50-12.5 mg tablet   No No   Sig: Take 1 tablet by mouth once daily.   multivitamin tablet   Yes No   Sig: Take 1 tablet by mouth once daily.      Facility-Administered Medications: None       Sumaya Valdez

## 2024-07-11 NOTE — ED PROVIDER NOTES
Darius Roe is a 79 y.o. patient presenting to the ED for near syncope.  The patient states he has had issues in the past with getting dizzy and hypotensive when he stands up.  He states that this occurred 2 weeks ago but seem to get better.  Today it became more pronounced again.  His blood pressure was in the 90s over 40s after he stood up and began feeling lightheaded.  It is only triggered by standing.  It is not triggered by head movement.  He has generalized weakness and nausea associated with this but denies any other associated symptoms.  He does follow with Dr. Lewis with cardiology.    Additional History Obtained from: None  Limitations to History: None  ------------------------------------------------------------------------------------------------------------------------------------------  Physical Exam:  Appearance: Alert, cooperative.   Skin: Warm, dry, appropriate color for ethnicity.  Eyes: Cornea clear. No scleral icterus or injection.  Extraocular movements full and equal without nystagmus.  Pupils equal and reactive bilaterally.  ENT: Mucous membranes moist.  Oropharynx symmetric.  Pulmonary: No accessory muscle use or stridor. Clear lung sounds bilaterally without rhonchi or wheezing.   Cardiac: Heart sounds regular without murmur. B/L radial and posterior tibial pulses full and symmetric.   Abdomen: Soft, not tender.  No rebound or guarding.   Musculoskeletal: No gross deformities.   Neurological: Face symmetrical. Voice clear. Appropriately conversant.   Psychiatric: Appropriate mood and affect.    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    Social Determinants of Health Significantly Affecting Care: None identified    Differential Diagnosis Considered but not limited to: Patient  with recurrent orthostatic lightheadedness.  He has not lost consciousness reports coming close to this.  He has not hypotensive here.  Dehydration, electrolyte abnormality. Less likely infection.       External Records Reviewed:   I reviewed recent and relevant outside records including:     Independent Interpretation of Studies: The following studies were ordered as part of the emergency department work up and independently interpreted by me. See ED Course for details.    ED Course as of 07/11/24 0339   Wed Jul 10, 2024   2142 CBC with leukocytosis to 16.  This is neutrophilic without bandemia.  While demargination is a consideration occult infection is also considered.  The patient has no chest pain, shortness of breath, cough, congestion.  No abdominal symptoms other than nausea isolated to his episode of hypotension. [SP]   2157 CMP shows mildly elevated creatinine at 1.4.  The patient's baseline does appear to be 0.9.  Electrolytes are within normal limits. [SP]   2157 EKG performed at 2125 and interpreted by me shows sinus rhythm at a rate of 79.  Intervals are normal.  There is left axis deviation.  There are no ST elevations or depressions.  No T wave changes.  No STEMI.EKG is unchanged from EKG of September 2, 2023. [SP]   2355 Urinalysis without evidence of infection. [SP]      ED Course User Index  [SP] Marilia Dueñas DO         Diagnoses as of 07/11/24 0339   Near syncope   Pneumonia due to organism   Community acquired pneumonia of right lower lobe of lung         Escalation of Care:  All the patient denies cough or other respiratory symptoms on multiple reevaluations the patient is coughing during exam and while talking to me.  Chest x-ray was therefore ordered.  He does have atelectasis versus pneumonia.  The remainder of his workup has been negative without other source of infection identified.  As such I am concerned about pneumonia potentially contributing to his symptoms.  He was given Rocephin and  azithromycin.  He persistently feels lightheaded despite IV fluids.  He is not hypotensive in the ER.  He is not septic.      Discussion of Management with Other Providers:   I discussed the patient/results with: Dr. Silva, hospitalist       Marilia Dueñas DO  07/11/24 7254

## 2024-07-11 NOTE — ED NOTES
Pt arrives to ED via home d/t  near syncopal episode  from initally hypotensive now normotensive. Alert X oriented x3    Code Status:  Full Code    HPI     Chief Complaint   Patient presents with    Hypotension     Pt states he at [employer] where he became lightheaded. Pt was helped to a bench where he felt like passing out. A nurse who was at the league took Pt's BP and was found to be low.        /85 (BP Location: Left arm, Patient Position: Lying)   Pulse 71   Temp 36.6 °C (97.9 °F) (Temporal)   Resp 15   Wt 74.8 kg (165 lb)   SpO2 94%     No data recorded    LDA:   Peripheral IV 07/10/24 20 G Right;Lateral Antecubital (Active)   Placement Date/Time: 07/10/24 2126   Hand Hygiene Completed: Yes  Size (Gauge): 20 G  Orientation: Right;Lateral  Location: Antecubital  Technique: Anatomical landmarks  Placed by: Cher CHEEMA RN  Insertion attempts: 1  Patient Tolerance: Age appropriate   Number of days: 0       Peripheral IV 07/11/24 20 G Left Antecubital (Active)   Placement Date/Time: 07/11/24 0216   Hand Hygiene Completed: Yes  Size (Gauge): 20 G  Orientation: Left  Location: Antecubital  Site Prep: Chlorhexidine   Local Anesthetic: None  Insertion attempts: 1  Patient Tolerance: Tolerated well   Number of days: 0        BACKGROUND  Past Medical History:   Diagnosis Date    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 root (CMS-HCC)     Atrial fibrillation (Multi)     BPH (benign prostatic hyperplasia) 05/28/2018    CAD (coronary artery disease)     Diverticulosis 05/28/2018    Hypertension     NSTEMI (non-ST elevated myocardial infarction) (Multi) 05/28/2018     Past Surgical History:   Procedure Laterality Date    CORONARY ANGIOPLASTY  06/12/2018    PTCA    CT ANGIO NECK  11/9/2021    CT NECK ANGIO W AND WO IV CONTRAST 11/9/2021 [facility]    CT HEAD ANGIO W AND WO IV CONTRAST  11/10/2021    CT HEAD ANGIO W AND WO IV CONTRAST 11/10/2021 [facility]    MR NECK ANGIO W AND WO  IV CONTRAST  11/10/2021    MR NECK ANGIO W AND WO IV CONTRAST 11/10/2021 [facility]    OTHER SURGICAL HISTORY  07/16/2020    Knee surgery     No current facility-administered medications on file prior to encounter.     Current Outpatient Medications on File Prior to Encounter   Medication Sig Dispense Refill    amLODIPine (Norvasc) 5 mg tablet Take 1 tablet (5 mg) by mouth once daily.      apixaban (Eliquis) 5 mg tablet Take 1 tablet (5 mg) by mouth 2 times a day.  AS DIRECTED BY THE [facility] ([phone] EXT. [phone])      aspirin 81 mg chewable tablet Chew 1 tablet (81 mg) once daily.      atorvastatin (Lipitor) 40 mg tablet Take 1 tablet (40 mg) by mouth once daily at bedtime. 90 tablet 3    cholecalciferol (Vitamin D-3) 25 MCG (1000 UT) capsule Take 1 capsule (25 mcg) by mouth once daily. Take as directed      losartan-hydrochlorothiazide (Hyzaar) 50-12.5 mg tablet Take 1 tablet by mouth once daily. 90 tablet 2    multivitamin tablet Take 1 tablet by mouth once daily.      [DISCONTINUED] albuterol 90 mcg/actuation inhaler Inhale 2 puffs 4 times a day as needed.          ASSESSMENT  ED Course as of 07/11/24 1146   Wed Jul 10, 2024   2142 CBC with leukocytosis to 16.  This is neutrophilic without bandemia.  While demargination is a consideration occult infection is also considered.  The patient has no chest pain, shortness of breath, cough, congestion.  No abdominal symptoms other than nausea isolated to his episode of hypotension. [SP]   2157 CMP shows mildly elevated creatinine at 1.4.  The patient's baseline does appear to be 0.9.  Electrolytes are within normal limits. [SP]   2157 EKG performed at 2125 and interpreted by me shows sinus rhythm at a rate of 79.  Intervals are normal.  There is left axis deviation.  There are no ST elevations or depressions.  No T wave changes.  No STEMI.EKG is unchanged from EKG of September 2, 2023. [SP]   2355 Urinalysis without evidence of infection. [SP]       ED Course User Index  [SP] Marilia Dueñas DO         Diagnoses as of 07/11/24 1146   Near syncope   Pneumonia due to organism   Community acquired pneumonia of right lower lobe of lung       Medications Currently Running:  sodium chloride 0.9%, 75 mL/hr, Last Rate: 75 mL/hr (07/11/24 0443)         Medications Given:  ED Medication Administration from 07/10/2024 2055 to 07/11/2024 1146         Date/Time Order Dose Route Action Action by     07/10/2024 2135 EDT lactated Ringer's bolus 500 mL 500 mL intravenous New Bag Shaun, R     07/10/2024 2239 EDT lactated Ringer's bolus 500 mL 0 mL intravenous Stopped Shaun, R     07/10/2024 2239 EDT lactated Ringer's bolus 500 mL 500 mL intravenous New Bag Shaun, R     07/10/2024 2350 EDT lactated Ringer's bolus 500 mL 0 mL intravenous Stopped Shaun, R     07/11/2024 0211 EDT cefTRIAXone (Rocephin) 2 g in dextrose (iso) IV 50 mL 2 g intravenous New Bag Shaun, R     07/11/2024 0233 EDT azithromycin (Zithromax) in dextrose 5 % in water (D5W) 250 mL  mg 500 mg intravenous New Bag Shaun, R     07/11/2024 0241 EDT cefTRIAXone (Rocephin) 2 g in dextrose (iso) IV 50 mL 0 g intravenous Stopped Shaun, R     07/11/2024 0329 EDT losartan 50 mg, hydroCHLOROthiazide 12.5 mg for Hyzaar 50/12.5 -- oral Held by provider ERICH Silva     07/11/2024 0346 EDT azithromycin (Zithromax) in dextrose 5 % in water (D5W) 250 mL  mg 0 mg intravenous Stopped Shaun, R     07/11/2024 0443 EDT sodium chloride 0.9% infusion 75 mL/hr intravenous New Bag Shaun, R     07/11/2024 0842 EDT amLODIPine (Norvasc) tablet 5 mg 5 mg oral Given ClJIMMY cornejo     07/11/2024 0842 EDT apixaban (Eliquis) tablet 5 mg 5 mg oral Given Clutter, J     07/11/2024 0842 EDT aspirin chewable tablet 81 mg 81 mg oral Given Clutter, J     07/11/2024 0842 EDT pantoprazole (ProtoNix) EC tablet 40 mg 40 mg oral Given Clutter, J     07/11/2024 0842 EDT pantoprazole (ProtoNix) injection 40 mg -- intravenous See  Alternative Clutter, J     07/11/2024 0900 EDT losartan 50 mg, hydroCHLOROthiazide 12.5 mg for Hyzaar 50/12.5 -- oral Dose Auto Held Salinas, A     07/12/2024 0900 EDT losartan 50 mg, hydroCHLOROthiazide 12.5 mg for Hyzaar 50/12.5 -- oral Dose Auto Held [place], A     07/13/2024 0900 EDT losartan 50 mg, hydroCHLOROthiazide 12.5 mg for Hyzaar 50/12.5 -- oral Dose Auto Held [place], A     07/14/2024 0900 EDT losartan 50 mg, hydroCHLOROthiazide 12.5 mg for Hyzaar 50/12.5 -- oral Dose Auto Held [place], A     07/15/2024 0900 EDT losartan 50 mg, hydroCHLOROthiazide 12.5 mg for Hyzaar 50/12.5 -- oral Dose Auto Held Sal[place], A                 RESULTS    Imaging:  XR chest 1 view   Final Result   1. Bibasilar bandlike subsegmental opacities favoring to represent   atelectasis, however infection is not excluded.             Signed by: Abram Sibley 7/11/2024 12:56 AM   Dictation workstation:   [id number]         }  Labs ::99  Abnormal Labs Reviewed   CBC WITH AUTO DIFFERENTIAL - Abnormal; Notable for the following components:       Result Value    WBC 16.1 (*)     MCHC 36.1 (*)     Neutrophils Absolute 13.41 (*)     Monocytes Absolute 0.94 (*)     All other components within normal limits   COMPREHENSIVE METABOLIC PANEL - Abnormal; Notable for the following components:    Glucose 189 (*)     Sodium 134 (*)     Creatinine 1.40 (*)     eGFR 51 (*)     All other components within normal limits   URINALYSIS WITH REFLEX CULTURE AND MICROSCOPIC - Abnormal; Notable for the following components:    Glucose, Urine 30 (TRACE) (*)     All other components within normal limits   CBC - Abnormal; Notable for the following components:    WBC 12.4 (*)     RBC 4.36 (*)     Hematocrit 39.8 (*)     MCHC 36.7 (*)     RDW 11.4 (*)     All other components within normal limits   BASIC METABOLIC PANEL - Abnormal; Notable for the following components:    Glucose 156 (*)     Sodium 135 (*)     All other components within normal limits   URINALYSIS MICROSCOPIC  WITH REFLEX CULTURE - Abnormal; Notable for the following components:    Hyaline Casts, Urine 3+ (*)     All other components within normal limits                Karma Kim RN  07/11/24 0303

## 2024-07-11 NOTE — CARE PLAN
The patient's goals for the shift include      The clinical goals for the shift include Pt blood pressure will be 160 or less systolically this shift      Problem: Pain - Adult  Goal: Verbalizes/displays adequate comfort level or baseline comfort level  7/11/2024 1314 by Genevieve Cee RN  Outcome: Progressing  7/11/2024 1310 by Genevieve Cee RN  Outcome: Progressing     Problem: Safety - Adult  Goal: Free from fall injury  7/11/2024 1314 by Genevieve Cee RN  Outcome: Progressing  7/11/2024 1310 by Genevieve Cee RN  Outcome: Progressing     Problem: Discharge Planning  Goal: Discharge to home or other facility with appropriate resources  7/11/2024 1314 by Genevieve Cee RN  Outcome: Progressing  7/11/2024 1310 by Genevieve Cee RN  Outcome: Progressing     Problem: Chronic Conditions and Co-morbidities  Goal: Patient's chronic conditions and co-morbidity symptoms are monitored and maintained or improved  7/11/2024 1314 by Genevieve Cee RN  Outcome: Progressing  7/11/2024 1310 by Genevieve Cee RN  Outcome: Progressing     Pt resting comfortably in bed. No concerns or questions at this time

## 2024-07-12 LAB
ANION GAP SERPL CALC-SCNC: 12 MMOL/L (ref 10–20)
BASOPHILS # BLD AUTO: 0 X10*3/UL (ref 0–0.1)
BASOPHILS NFR BLD AUTO: 0 %
BUN SERPL-MCNC: 13 MG/DL (ref 6–23)
CALCIUM SERPL-MCNC: 9 MG/DL (ref 8.6–10.3)
CHLORIDE SERPL-SCNC: 104 MMOL/L (ref 98–107)
CO2 SERPL-SCNC: 23 MMOL/L (ref 21–32)
CREAT SERPL-MCNC: 0.95 MG/DL (ref 0.5–1.3)
EGFRCR SERPLBLD CKD-EPI 2021: 81 ML/MIN/1.73M*2
EOSINOPHIL # BLD AUTO: 0 X10*3/UL (ref 0–0.4)
EOSINOPHIL NFR BLD AUTO: 0 %
ERYTHROCYTE [DISTWIDTH] IN BLOOD BY AUTOMATED COUNT: 11.5 % (ref 11.5–14.5)
GLUCOSE SERPL-MCNC: 201 MG/DL (ref 74–99)
HCT VFR BLD AUTO: 40.5 % (ref 41–52)
HGB BLD-MCNC: 14.6 G/DL (ref 13.5–17.5)
IMM GRANULOCYTES # BLD AUTO: 0.04 X10*3/UL (ref 0–0.5)
IMM GRANULOCYTES NFR BLD AUTO: 0.4 % (ref 0–0.9)
LEGIONELLA AG UR QL: NEGATIVE
LYMPHOCYTES # BLD AUTO: 1.41 X10*3/UL (ref 0.8–3)
LYMPHOCYTES NFR BLD AUTO: 15.5 %
MCH RBC QN AUTO: 33.1 PG (ref 26–34)
MCHC RBC AUTO-ENTMCNC: 36 G/DL (ref 32–36)
MCV RBC AUTO: 92 FL (ref 80–100)
MONOCYTES # BLD AUTO: 0.39 X10*3/UL (ref 0.05–0.8)
MONOCYTES NFR BLD AUTO: 4.3 %
NEUTROPHILS # BLD AUTO: 7.28 X10*3/UL (ref 1.6–5.5)
NEUTROPHILS NFR BLD AUTO: 79.8 %
NRBC BLD-RTO: 0 /100 WBCS (ref 0–0)
PLATELET # BLD AUTO: 269 X10*3/UL (ref 150–450)
POTASSIUM SERPL-SCNC: 3.7 MMOL/L (ref 3.5–5.3)
RBC # BLD AUTO: 4.41 X10*6/UL (ref 4.5–5.9)
S PNEUM AG UR QL: NEGATIVE
SODIUM SERPL-SCNC: 135 MMOL/L (ref 136–145)
WBC # BLD AUTO: 9.1 X10*3/UL (ref 4.4–11.3)

## 2024-07-12 PROCEDURE — 99233 SBSQ HOSP IP/OBS HIGH 50: CPT | Performed by: INTERNAL MEDICINE

## 2024-07-12 PROCEDURE — 80048 BASIC METABOLIC PNL TOTAL CA: CPT | Performed by: INTERNAL MEDICINE

## 2024-07-12 PROCEDURE — 2500000004 HC RX 250 GENERAL PHARMACY W/ HCPCS (ALT 636 FOR OP/ED): Performed by: STUDENT IN AN ORGANIZED HEALTH CARE EDUCATION/TRAINING PROGRAM

## 2024-07-12 PROCEDURE — 36415 COLL VENOUS BLD VENIPUNCTURE: CPT | Performed by: INTERNAL MEDICINE

## 2024-07-12 PROCEDURE — 2500000004 HC RX 250 GENERAL PHARMACY W/ HCPCS (ALT 636 FOR OP/ED): Performed by: INTERNAL MEDICINE

## 2024-07-12 PROCEDURE — 1200000002 HC GENERAL ROOM WITH TELEMETRY DAILY

## 2024-07-12 PROCEDURE — 85025 COMPLETE CBC W/AUTO DIFF WBC: CPT | Performed by: INTERNAL MEDICINE

## 2024-07-12 PROCEDURE — 2500000001 HC RX 250 WO HCPCS SELF ADMINISTERED DRUGS (ALT 637 FOR MEDICARE OP): Performed by: INTERNAL MEDICINE

## 2024-07-12 PROCEDURE — 2500000001 HC RX 250 WO HCPCS SELF ADMINISTERED DRUGS (ALT 637 FOR MEDICARE OP): Performed by: STUDENT IN AN ORGANIZED HEALTH CARE EDUCATION/TRAINING PROGRAM

## 2024-07-12 ASSESSMENT — COGNITIVE AND FUNCTIONAL STATUS - GENERAL
DAILY ACTIVITIY SCORE: 24
MOBILITY SCORE: 24
MOBILITY SCORE: 24
DAILY ACTIVITIY SCORE: 24

## 2024-07-12 ASSESSMENT — PAIN - FUNCTIONAL ASSESSMENT: PAIN_FUNCTIONAL_ASSESSMENT: 0-10

## 2024-07-12 ASSESSMENT — PAIN SCALES - GENERAL: PAINLEVEL_OUTOF10: 0 - NO PAIN

## 2024-07-12 NOTE — CARE PLAN
The patient's goals for the shift include      The clinical goals for the shift include pt blood pressure will be <`160 systolically this shift    Over the shift, the patient did not make progress toward the following goals. Barriers to progression include n/a. Recommendations to address these barriers include n/a.

## 2024-07-12 NOTE — CARE PLAN
The patient's goals for the shift include      Problem: Pain - Adult  Goal: Verbalizes/displays adequate comfort level or baseline comfort level  Outcome: Progressing     Problem: Safety - Adult  Goal: Free from fall injury  Outcome: Progressing     Problem: Discharge Planning  Goal: Discharge to home or other facility with appropriate resources  Outcome: Progressing     Problem: Chronic Conditions and Co-morbidities  Goal: Patient's chronic conditions and co-morbidity symptoms are monitored and maintained or improved  Outcome: Progressing     Problem: Fall/Injury  Goal: Not fall by end of shift  Outcome: Progressing  Goal: Be free from injury by end of the shift  Outcome: Progressing  Goal: Verbalize understanding of personal risk factors for fall in the hospital  Outcome: Progressing  Goal: Verbalize understanding of risk factor reduction measures to prevent injury from fall in the home  Outcome: Progressing  Goal: Use assistive devices by end of the shift  Outcome: Progressing  Goal: Pace activities to prevent fatigue by end of the shift  Outcome: Progressing       The clinical goals for the shift include wean to room air by end of shift.    See assessment and mar. Weaned oxygen from 2 liters to 1 liter nc. AM labs ordered. Tele as ordered.

## 2024-07-12 NOTE — PROGRESS NOTES
Darius Roe is a 79 y.o. male on day 1 of admission presenting with Pneumonia due to organism.      Subjective   Darius Roe is a 79 y.o. male with PMHx s/f Afib on Eliquis, aortic regurgitation, HTN, BPV, CAD, diverticulosis, emphysema, HLD, CHRISTINE presenting with lightheadedness.  Patient had an episode of lightheadedness while playing horseshoes tonight and was brought in for evaluation. He has these chronic episodes of lightheadedness and hypotension when standing up.  He does see cardiology for these and has had implantable loop recorder the past several years.  His loop recorder has been unable to find anything besides occasional sinus pauses during the night associated with apneic events when he is not wearing his CPAP.  Cardiology has recommended compression stockings. He says these symptoms have been worsening over the past several days.  They first occurred on Saturday and then resolved and then reoccurred tonight.  He does say they are accompanied by shortness of breath and some dry coughing.  He denies chest pain, syncope, nausea, vomiting, abdominal pain or bowel or bladder issues currently.  He has not fallen or hit his head.  No vision or hearing changes.  No sick contacts, general malaise, or fever or chills.     ED Course (Summary):   Vitals on presentation: 97.9 F, 79 bpm, 17 rr, 152/83 --> 181/86, 94% on RA  Labs: CMP glu 189, Na 134, Cr 1.40 (baseline WNL)  Mag 1.69  Lactate 1.6  CBC WBC 16.1, Hg 16.4, Plt 268  UA trace glucose  Imaging: CXR - Bibasilar bandlike subsegmental opacities favoring to represent   atelectasis, however infection is not excluded.   EKG - Sinus rhythm at 79 bpm.  Interventions: Rocephin and azithromycin, LR 1 L bolus, Pt will be admitted for pneumonia.  7/11: Patient was seen and examined.  He is requiring 2 L nasal cannula oxygen to maintain saturation.  Admits to longtime smoking history.  Possible undiagnosed COPD I will add Solu-Medrol and continue current  antibiotics and breathing treatments.  Continue current IV antibiotics.  Blood cultures are pending.  Urine Legionella and strep antigen also pending.  SHANTA much improved.  Orthostatic blood pressure completed and negative.  Wean oxygen as tolerated.  Repeat CBC and BMP in AM.  7/12: Patient was seen and examined now saturating well on 1 L nasal cannula oxygen.  Blood cultures are negative x 1 day.  Legionella and strep antigen are still pending.  SHANTA now resolved.  Continue current IV antibiotics.  Likely changed to oral steroid in AM.  Potential discharge within the next 24 to 48 hours.       Objective     Last Recorded Vitals  /79 (BP Location: Right arm, Patient Position: Lying)   Pulse 77   Temp 36.5 °C (97.7 °F) (Temporal)   Resp 17   Wt 74.8 kg (165 lb)   SpO2 94%   Intake/Output last 3 Shifts:    Intake/Output Summary (Last 24 hours) at 7/12/2024 1308  Last data filed at 7/12/2024 0900  Gross per 24 hour   Intake 2393.75 ml   Output 1150 ml   Net 1243.75 ml       Admission Weight  Weight: 74.8 kg (165 lb) (07/10/24 2125)    Daily Weight  07/11/24 : 74.8 kg (165 lb)    Image Results  ECG 12 lead  Sinus rhythm  Inferior infarct, old  Consider anterior infarct  XR chest 1 view  Narrative: Interpreted By:  Abram Sibley,   STUDY:  XR CHEST 1 VIEW;  7/10/2024 11:47 pm      INDICATION:  Signs/Symptoms:leukocytosis, weakness.      COMPARISON:  Chest radiograph 09/02/2023 and chest CT 05/28/2018.      ACCESSION NUMBER(S):  [id number]      ORDERING CLINICIAN:  KRAIG KANG      FINDINGS:      CARDIOMEDIASTINAL SILHOUETTE:  Cardiomediastinal silhouette is normal in size and configuration.  There is a left chest wall cardiac loop recorder.      LUNGS/PLEURA:  There are bibasilar bandlike subsegmental opacities favoring to  represent atelectasis, however infection is not excluded..There are  no pleural effusions. There is no demonstrated pneumothorax.          BONES: No evidence of acute osseous abnormality.       Impression: 1. Bibasilar bandlike subsegmental opacities favoring to represent  atelectasis, however infection is not excluded.          Signed by: Abram Sibley 7/11/2024 12:56 AM  Dictation workstation:   [id number]      Physical Exam  Constitutional: Pleasant and cooperative. Laying in bed in no acute distress. Conversant.   Skin: Warm and dry; no obvious lesions, rashes, pallor, or jaundice. Good turgor.   Eyes: EOMI. Anicteric sclera.   ENT: Mucous membranes moist; no obvious injury or deformity appreciated.   Head and Neck: Normocephalic, atraumatic. ROM preserved. Trachea midline. No appreciable JVD.   Respiratory: Nonlabored on RA. Lungs clear to auscultation bilaterally without obvious adventitious sounds. Chest rise is equal.  Cardiovascular: RRR. No gross murmur, gallop, or rub. Extremities are warm and well-perfused with good capillary refill (< 3 seconds). No chest wall tenderness.   GI: Abdomen soft, nontender, nondistended. No obvious organomegaly appreciated. Bowel sounds are present and normoactive.  : No CVA tenderness.   MSK: No gross abnormalities appreciated. No limitations to AROM/PROM appreciated.   Extremities: No cyanosis, edema, or clubbing evident. Neurovascularly intact.   Neuro: A&Ox3. CN 2-12 grossly intact. Able to respond to questions appropriately and clearly. No acute focal neurologic deficits appreciated.  Psych: Appropriate mood and behavior.     Relevant Results               Assessment/Plan                  Principal Problem:    Pneumonia due to organism  Active Problems:    CAD (coronary artery disease)    A-fib (Multi)    Benign essential hypertension    Hyperlipidemia    Obstructive sleep apnea of adult    Near syncope    Hyperglycemia    SHANTA (acute kidney injury) (CMS-HCC)    Leukocytosis    Pneumonia:  Rocephin and azithromycin  Strep pneumo and legionella antigen pending blood cultures negative x 1 day  No signs of sepsis on admission    COPD exacerbation  Breathing  treatment with DuoNeb  P.o. prednisone  Continue antibiotics as above      Acute hypoxic respiratory failure  Likely due to 1 and 2 above  Continue nasal cannula oxygen and wean as tolerated       SHANTA:  Resolved.  Gentle hydration  Dehydration also likely contributing to pre-syncopal symptoms  Hold home Cozaar/hydrochlorothiazide     HTN:  Resume home amlodipine  Add as needed hydralazine      HLD/Cad:  Home statin, ASA     Hx afib:  Home Eliquis     Diet: Regular  DVT Prophylaxis: Home Eliquis  Code Status: Full code per discussion with pt.          Spent 35 minutes in the follow-up management of this patient today       Bernadette Clemens MD

## 2024-07-12 NOTE — PROGRESS NOTES
07/12/24 1048   Discharge Planning   Living Arrangements Spouse/significant other   Support Systems Spouse/significant other;Children   Assistance Needed none needed, patient is independent in his own care   Type of Residence Private residence   Home or Post Acute Services None   Expected Discharge Disposition Home   Does the patient need discharge transport arranged? No     Met with patient at bedside, introduced self and role as RN TCC. Patient lives at home with spouse. He is independent in his own care. Patient states he is able to shower, dress, ambulate, manage his own meds, etc independently. [facility]. He is currently on 1-2L NC, no Home O2, wean as tolerated. TCC to follow if needs arise.

## 2024-07-13 ENCOUNTER — PHARMACY VISIT (OUTPATIENT)
Dept: PHARMACY | Facility: CLINIC | Age: 79
End: 2024-07-13
Payer: COMMERCIAL

## 2024-07-13 VITALS
HEART RATE: 71 BPM | BODY MASS INDEX: 23.62 KG/M2 | DIASTOLIC BLOOD PRESSURE: 85 MMHG | HEIGHT: 70 IN | TEMPERATURE: 97.9 F | SYSTOLIC BLOOD PRESSURE: 165 MMHG | OXYGEN SATURATION: 92 % | WEIGHT: 165 LBS | RESPIRATION RATE: 18 BRPM

## 2024-07-13 PROBLEM — N17.9 AKI (ACUTE KIDNEY INJURY) (CMS-HCC): Status: RESOLVED | Noted: 2024-07-11 | Resolved: 2024-07-13

## 2024-07-13 PROBLEM — J18.9 PNEUMONIA DUE TO ORGANISM: Status: RESOLVED | Noted: 2024-07-11 | Resolved: 2024-07-13

## 2024-07-13 PROBLEM — R55 NEAR SYNCOPE: Status: RESOLVED | Noted: 2023-09-12 | Resolved: 2024-07-13

## 2024-07-13 PROBLEM — R73.9 HYPERGLYCEMIA: Status: RESOLVED | Noted: 2024-07-11 | Resolved: 2024-07-13

## 2024-07-13 PROBLEM — D72.829 LEUKOCYTOSIS: Status: RESOLVED | Noted: 2024-07-11 | Resolved: 2024-07-13

## 2024-07-13 LAB
ALBUMIN SERPL BCP-MCNC: 3.6 G/DL (ref 3.4–5)
ALP SERPL-CCNC: 66 U/L (ref 33–136)
ALT SERPL W P-5'-P-CCNC: 17 U/L (ref 10–52)
ANION GAP SERPL CALC-SCNC: 13 MMOL/L (ref 10–20)
AST SERPL W P-5'-P-CCNC: 16 U/L (ref 9–39)
BASOPHILS # BLD AUTO: 0.01 X10*3/UL (ref 0–0.1)
BASOPHILS NFR BLD AUTO: 0.1 %
BILIRUB SERPL-MCNC: 0.4 MG/DL (ref 0–1.2)
BUN SERPL-MCNC: 18 MG/DL (ref 6–23)
CALCIUM SERPL-MCNC: 9 MG/DL (ref 8.6–10.3)
CHLORIDE SERPL-SCNC: 104 MMOL/L (ref 98–107)
CO2 SERPL-SCNC: 23 MMOL/L (ref 21–32)
CREAT SERPL-MCNC: 0.97 MG/DL (ref 0.5–1.3)
EGFRCR SERPLBLD CKD-EPI 2021: 79 ML/MIN/1.73M*2
EOSINOPHIL # BLD AUTO: 0 X10*3/UL (ref 0–0.4)
EOSINOPHIL NFR BLD AUTO: 0 %
ERYTHROCYTE [DISTWIDTH] IN BLOOD BY AUTOMATED COUNT: 11.6 % (ref 11.5–14.5)
GLUCOSE SERPL-MCNC: 164 MG/DL (ref 74–99)
HCT VFR BLD AUTO: 39.8 % (ref 41–52)
HGB BLD-MCNC: 14.7 G/DL (ref 13.5–17.5)
IMM GRANULOCYTES # BLD AUTO: 0.07 X10*3/UL (ref 0–0.5)
IMM GRANULOCYTES NFR BLD AUTO: 0.5 % (ref 0–0.9)
LYMPHOCYTES # BLD AUTO: 2.31 X10*3/UL (ref 0.8–3)
LYMPHOCYTES NFR BLD AUTO: 16 %
MCH RBC QN AUTO: 33.9 PG (ref 26–34)
MCHC RBC AUTO-ENTMCNC: 36.9 G/DL (ref 32–36)
MCV RBC AUTO: 92 FL (ref 80–100)
MONOCYTES # BLD AUTO: 1.02 X10*3/UL (ref 0.05–0.8)
MONOCYTES NFR BLD AUTO: 7 %
NEUTROPHILS # BLD AUTO: 11.07 X10*3/UL (ref 1.6–5.5)
NEUTROPHILS NFR BLD AUTO: 76.4 %
NRBC BLD-RTO: 0 /100 WBCS (ref 0–0)
PLATELET # BLD AUTO: 291 X10*3/UL (ref 150–450)
POTASSIUM SERPL-SCNC: 3.5 MMOL/L (ref 3.5–5.3)
PROT SERPL-MCNC: 6.4 G/DL (ref 6.4–8.2)
RBC # BLD AUTO: 4.34 X10*6/UL (ref 4.5–5.9)
SODIUM SERPL-SCNC: 136 MMOL/L (ref 136–145)
WBC # BLD AUTO: 14.5 X10*3/UL (ref 4.4–11.3)

## 2024-07-13 PROCEDURE — 2500000001 HC RX 250 WO HCPCS SELF ADMINISTERED DRUGS (ALT 637 FOR MEDICARE OP): Performed by: INTERNAL MEDICINE

## 2024-07-13 PROCEDURE — 80053 COMPREHEN METABOLIC PANEL: CPT | Performed by: INTERNAL MEDICINE

## 2024-07-13 PROCEDURE — 85025 COMPLETE CBC W/AUTO DIFF WBC: CPT | Performed by: INTERNAL MEDICINE

## 2024-07-13 PROCEDURE — 99239 HOSP IP/OBS DSCHRG MGMT >30: CPT | Performed by: INTERNAL MEDICINE

## 2024-07-13 PROCEDURE — 2500000001 HC RX 250 WO HCPCS SELF ADMINISTERED DRUGS (ALT 637 FOR MEDICARE OP): Performed by: STUDENT IN AN ORGANIZED HEALTH CARE EDUCATION/TRAINING PROGRAM

## 2024-07-13 PROCEDURE — RXMED WILLOW AMBULATORY MEDICATION CHARGE

## 2024-07-13 PROCEDURE — 36415 COLL VENOUS BLD VENIPUNCTURE: CPT | Performed by: INTERNAL MEDICINE

## 2024-07-13 PROCEDURE — 2500000004 HC RX 250 GENERAL PHARMACY W/ HCPCS (ALT 636 FOR OP/ED): Performed by: INTERNAL MEDICINE

## 2024-07-13 PROCEDURE — 2500000004 HC RX 250 GENERAL PHARMACY W/ HCPCS (ALT 636 FOR OP/ED): Performed by: STUDENT IN AN ORGANIZED HEALTH CARE EDUCATION/TRAINING PROGRAM

## 2024-07-13 RX ORDER — CEFUROXIME AXETIL 500 MG/1
500 TABLET ORAL 2 TIMES DAILY
Qty: 10 TABLET | Refills: 0 | Status: SHIPPED | OUTPATIENT
Start: 2024-07-13 | End: 2024-07-18

## 2024-07-13 RX ORDER — DOXYCYCLINE 100 MG/1
100 CAPSULE ORAL 2 TIMES DAILY
Qty: 10 CAPSULE | Refills: 0 | Status: SHIPPED | OUTPATIENT
Start: 2024-07-13 | End: 2024-07-18

## 2024-07-13 RX ORDER — GUAIFENESIN 600 MG/1
600 TABLET, EXTENDED RELEASE ORAL EVERY 12 HOURS PRN
Qty: 10 TABLET | Refills: 0 | Status: SHIPPED | OUTPATIENT
Start: 2024-07-13 | End: 2024-07-18

## 2024-07-13 RX ORDER — PREDNISONE 20 MG/1
40 TABLET ORAL DAILY
Qty: 6 TABLET | Refills: 0 | Status: SHIPPED | OUTPATIENT
Start: 2024-07-13 | End: 2024-07-17 | Stop reason: HOSPADM

## 2024-07-13 ASSESSMENT — COGNITIVE AND FUNCTIONAL STATUS - GENERAL
MOBILITY SCORE: 24
DAILY ACTIVITIY SCORE: 24

## 2024-07-13 ASSESSMENT — PAIN SCALES - GENERAL: PAINLEVEL_OUTOF10: 0 - NO PAIN

## 2024-07-13 NOTE — DISCHARGE SUMMARY
Discharge Diagnosis  Pneumonia due to organism  Acute kidney injury  COPD exacerbation  Hypertension  Hyperlipidemia  History of A-fib    Issues Requiring Follow-Up      Discharge Meds     Your medication list        START taking these medications        Instructions Last Dose Given Next Dose Due   cefuroxime 500 mg tablet  Commonly known as: Ceftin      Take 1 tablet (500 mg) by mouth 2 times a day for 5 days.       doxycycline 100 mg capsule  Commonly known as: Vibramycin      Take 1 capsule (100 mg) by mouth 2 times a day for 5 days. Take with at least 8 ounces (large glass) of water, do not lie down for 30 minutes after       guaiFENesin 600 mg 12 hr tablet  Commonly known as: Mucinex      Take 1 tablet (600 mg) by mouth every 12 hours if needed for congestion for up to 5 days. Do not crush, chew, or split.       predniSONE 20 mg tablet  Commonly known as: Deltasone      Take 2 tablets (40 mg) by mouth once daily for 3 doses.              CONTINUE taking these medications        Instructions Last Dose Given Next Dose Due   amLODIPine 5 mg tablet  Commonly known as: Norvasc           apixaban 5 mg tablet  Commonly known as: Eliquis           aspirin 81 mg chewable tablet           atorvastatin 40 mg tablet  Commonly known as: Lipitor      Take 1 tablet (40 mg) by mouth once daily at bedtime.       cholecalciferol 25 MCG (1000 UT) capsule  Commonly known as: Vitamin D-3           losartan-hydrochlorothiazide 50-12.5 mg tablet  Commonly known as: Hyzaar      Take 1 tablet by mouth once daily.       multivitamin tablet                     Where to Get Your Medications        These medications were sent to [facility]  [address] [address]      Hours: 8AM to 6PM Mon-Fri, 8AM to 4PM Sat-Sun Phone: [phone]   cefuroxime 500 mg tablet  doxycycline 100 mg capsule  guaiFENesin 600 mg 12 hr tablet  predniSONE 20 mg tablet         Test Results Pending At Discharge  Pending Labs       Order  Current Status    Blood Culture Preliminary result    Blood Culture Preliminary result            Hospital Course  Darius Roe is a 79 y.o. male with PMHx s/f Afib on Eliquis, aortic regurgitation, HTN, BPV, CAD, diverticulosis, emphysema, HLD, CHRISTINE presenting with lightheadedness.  Patient had an episode of lightheadedness while playing horseshoes tonight and was brought in for evaluation. He has these chronic episodes of lightheadedness and hypotension when standing up.  He does see cardiology for these and has had implantable loop recorder the past several years.  His loop recorder has been unable to find anything besides occasional sinus pauses during the night associated with apneic events when he is not wearing his CPAP.  Cardiology has recommended compression stockings. He says these symptoms have been worsening over the past several days.  They first occurred on Saturday and then resolved and then reoccurred tonight.  He does say they are accompanied by shortness of breath and some dry coughing.  He denies chest pain, syncope, nausea, vomiting, abdominal pain or bowel or bladder issues currently.  He has not fallen or hit his head.  No vision or hearing changes.  No sick contacts, general malaise, or fever or chills.     ED Course (Summary):   Vitals on presentation: 97.9 F, 79 bpm, 17 rr, 152/83 --> 181/86, 94% on RA  Labs: CMP glu 189, Na 134, Cr 1.40 (baseline WNL)  Mag 1.69  Lactate 1.6  CBC WBC 16.1, Hg 16.4, Plt 268  UA trace glucose  Imaging: CXR - Bibasilar bandlike subsegmental opacities favoring to represent   atelectasis, however infection is not excluded.   EKG - Sinus rhythm at 79 bpm.  Interventions: Rocephin and azithromycin, LR 1 L bolus, Pt will be admitted for pneumonia.  7/11: Patient was seen and examined.  He is requiring 2 L nasal cannula oxygen to maintain saturation.  Admits to longtime smoking history.  Possible undiagnosed COPD I will add Solu-Medrol and continue current  antibiotics and breathing treatments.  Continue current IV antibiotics.  Blood cultures are pending.  Urine Legionella and strep antigen also pending.  SHANTA much improved.  Orthostatic blood pressure completed and negative.  Wean oxygen as tolerated.  Repeat CBC and BMP in AM.  7/12: Patient was seen and examined now saturating well on 1 L nasal cannula oxygen.  Blood cultures are negative x 1 day.  Legionella and strep antigen are still pending.  SHANTA now resolved.  Continue current IV antibiotics.  Likely changed to oral steroid in AM.  Potential discharge within the next 24 to 48 hours.  7/13: Patient was seen and examined.  Continues to feel much better.  Blood cultures are negative x 2 days.  He was discharged in stable condition to follow-up as outpatient with primary care physician within 1 week of discharge.      The discharge process took about 35 minutes.    Pertinent Physical Exam At Time of Discharge  Physical Exam  Constitutional: Pleasant and cooperative. Laying in bed in no acute distress. Conversant.   Skin: Warm and dry; no obvious lesions, rashes, pallor, or jaundice. Good turgor.   Eyes: EOMI. Anicteric sclera.   ENT: Mucous membranes moist; no obvious injury or deformity appreciated.   Head and Neck: Normocephalic, atraumatic. ROM preserved. Trachea midline. No appreciable JVD.   Respiratory: Nonlabored on RA. Lungs clear to auscultation bilaterally without obvious adventitious sounds. Chest rise is equal.  Cardiovascular: RRR. No gross murmur, gallop, or rub. Extremities are warm and well-perfused with good capillary refill (< 3 seconds). No chest wall tenderness.   GI: Abdomen soft, nontender, nondistended. No obvious organomegaly appreciated. Bowel sounds are present and normoactive.  : No CVA tenderness.   MSK: No gross abnormalities appreciated. No limitations to AROM/PROM appreciated.   Extremities: No cyanosis, edema, or clubbing evident. Neurovascularly intact.   Neuro: A&Ox3. CN 2-12  grossly intact. Able to respond to questions appropriately and clearly. No acute focal neurologic deficits appreciated.  Psych: Appropriate mood and behavior  Outpatient Follow-Up  Future Appointments   Date Time Provider Department Center   8/8/2024 10:00 AM POR ECHO 1 PORNIC1 [place] [facility]   8/16/2024 11:45 AM Ko Lewis MD [id number] [facility]         [facility] Clemens MD

## 2024-07-13 NOTE — CARE PLAN
The patient's goals for the shift include  02 > 92% on roomair     The clinical goals for the shift include wean to room air by end of shift.    Over the shift, the patient did not make progress toward the following goals. Barriers to progression include  Recommendations to address these barriers include.

## 2024-07-14 LAB
ATRIAL RATE: 79 BPM
BACTERIA BLD CULT: NORMAL
BACTERIA BLD CULT: NORMAL
P AXIS: 23 DEGREES
PR INTERVAL: 184 MS
Q ONSET: 249 MS
QRS COUNT: 12 BEATS
QRS DURATION: 102 MS
QT INTERVAL: 421 MS
QTC CALCULATION(BAZETT): 483 MS
QTC FREDERICIA: 461 MS
R AXIS: -40 DEGREES
T AXIS: 68 DEGREES
T OFFSET: 460 MS
VENTRICULAR RATE: 79 BPM

## 2024-07-15 ENCOUNTER — PATIENT OUTREACH (OUTPATIENT)
Dept: CARE COORDINATION | Facility: CLINIC | Age: 79
End: 2024-07-15
Payer: MEDICARE

## 2024-07-15 LAB
BACTERIA BLD CULT: NORMAL
BACTERIA BLD CULT: NORMAL

## 2024-07-15 SDOH — ECONOMIC STABILITY: FOOD INSECURITY
ARE ANY OF YOUR NEEDS URGENT? FOR EXAMPLE, UNCERTAINTY OF WHERE YOU WILL GET YOUR NEXT MEAL OR NOT HAVING THE MEDICATIONS YOU NEED TO TAKE TOMORROW.: NO

## 2024-07-15 SDOH — ECONOMIC STABILITY: GENERAL: WOULD YOU LIKE HELP WITH ANY OF THE FOLLOWING NEEDS?: I DONT NEED HELP WITH ANY OF THESE

## 2024-07-15 NOTE — PROGRESS NOTES
"[facility]  Admitted 7/10/2024  Discharge 7/13/2024  Dx: Pneumonia, COPD Exacerbation, Syncope    Outreach call to patient to support a smooth transition of care from recent admission.  Patient states, he is doing \"alright.\" Patient states, he is still having the same issues as when he went to the hospital. Patient states, he still gets short of breath, lightheaded, and dizzy with activity. Patient states, when he feels this way he checks his pulse ox and it ranges 85%-86%. Patient states, he will sit and rest and recheck his pulse ox >90%. [facility] for a walking pulse ox to see if you qualify for home oxygen. Patient verbalized understanding.  Patient states, he checks his BS daily and days it's wnl and other days it is low 90's /50's. Today his /78. Patient states, he feels like no one addressed the real issue that caused him to go to the ED. Patient states, he wants to know what is causing the syncopal episodes and his blood pressure to drop. He states, he was told to increase his fluid intake. Patient states, he is drinking enough fluids. Patient to follow up with PCP and Cardiology and will discuss this at his follow up appointments. Reviewed discharge medications, discharge instructions, assessed social needs, and provided education on importance of follow-up appointment with provider.     Patient is waiting for a return call from [facility], [facility] PCP, and Cardiology to schedule follow up appointments.    Will continue to monitor through transition period.  Engagement  Call Start Time: 1404 (7/15/2024  2:04 PM)    Medications  Medications reviewed with patient/caregiver?: Yes (7/15/2024  2:04 PM)  Is the patient having any side effects they believe may be caused by any medication additions or changes?: No (7/15/2024  2:04 PM)  Does the patient have all medications ordered at discharge?: Yes (7/15/2024  2:04 PM)  Care Management Interventions: " No intervention needed (7/15/2024  2:04 PM)  Prescription Comments: Discharged on Ceftin, Doxycycline, Prednisone, Guaifenesin (prn) (7/15/2024  2:04 PM)  Is the patient taking all medications as directed (includes completed medication regime)?: Yes (7/15/2024  2:04 PM)  Care Management Interventions: Provided patient education (7/15/2024  2:04 PM)  Medication Comments: Prednisone, (7/15/2024  2:04 PM)    Appointments  Does the patient have a primary care provider?: Yes (Patient states, [facility] and is waiting for a return call. Patient states, he called Dr. Paulino office left a message and is waitig for a return call.) (7/15/2024  2:04 PM)  Care Management Interventions: Advised patient to make appointment (7/15/2024  2:04 PM)    Self Management  What is the home health agency?: not applicable (7/15/2024  2:04 PM)  What Durable Medical Equipment (DME) was ordered?: not applicable (7/15/2024  2:04 PM)    Patient Teaching  Does the patient have access to their discharge instructions?: Yes (7/15/2024  2:04 PM)  Care Management Interventions: Reviewed instructions with patient (7/15/2024  2:04 PM)  What is the patient's perception of their health status since discharge?: Same (7/15/2024  2:04 PM)  Is the patient/caregiver able to teach back the hierarchy of who to call/visit for symptoms/problems? PCP, Specialist, Home Health nurse, Urgent Care, ED, 911: Yes (7/15/2024  2:04 PM)    Juli Ace RN/CM  [facility]  [phone]

## 2024-07-16 ENCOUNTER — APPOINTMENT (OUTPATIENT)
Dept: RADIOLOGY | Facility: HOSPITAL | Age: 79
DRG: 064 | End: 2024-07-16
Payer: MEDICARE

## 2024-07-16 ENCOUNTER — APPOINTMENT (OUTPATIENT)
Dept: CARDIOLOGY | Facility: HOSPITAL | Age: 79
DRG: 064 | End: 2024-07-16
Payer: MEDICARE

## 2024-07-16 ENCOUNTER — HOSPITAL ENCOUNTER (INPATIENT)
Facility: HOSPITAL | Age: 79
LOS: 1 days | Discharge: HOME | DRG: 064 | End: 2024-07-17
Attending: EMERGENCY MEDICINE | Admitting: INTERNAL MEDICINE
Payer: MEDICARE

## 2024-07-16 DIAGNOSIS — I63.9 CEREBROVASCULAR ACCIDENT (CVA), UNSPECIFIED MECHANISM (MULTI): ICD-10-CM

## 2024-07-16 DIAGNOSIS — I48.91 ATRIAL FIBRILLATION, UNSPECIFIED TYPE (MULTI): ICD-10-CM

## 2024-07-16 DIAGNOSIS — R06.09 DOE (DYSPNEA ON EXERTION): ICD-10-CM

## 2024-07-16 DIAGNOSIS — R42 DIZZINESS: Primary | ICD-10-CM

## 2024-07-16 PROBLEM — E87.6 HYPOKALEMIA: Status: ACTIVE | Noted: 2024-07-16

## 2024-07-16 PROBLEM — E87.3 METABOLIC ALKALOSIS: Status: ACTIVE | Noted: 2024-07-16

## 2024-07-16 LAB
ALBUMIN SERPL BCP-MCNC: 3.7 G/DL (ref 3.4–5)
ALP SERPL-CCNC: 71 U/L (ref 33–136)
ALT SERPL W P-5'-P-CCNC: 30 U/L (ref 10–52)
ANION GAP BLDV CALCULATED.4IONS-SCNC: 8 MMOL/L (ref 10–25)
ANION GAP SERPL CALC-SCNC: 12 MMOL/L (ref 10–20)
APPEARANCE UR: CLEAR
AST SERPL W P-5'-P-CCNC: 16 U/L (ref 9–39)
ATRIAL RATE: 75 BPM
BASE EXCESS BLDV CALC-SCNC: 3.8 MMOL/L (ref -2–3)
BASOPHILS # BLD MANUAL: 0 X10*3/UL (ref 0–0.1)
BASOPHILS NFR BLD MANUAL: 0 %
BILIRUB SERPL-MCNC: 0.7 MG/DL (ref 0–1.2)
BILIRUB UR STRIP.AUTO-MCNC: NEGATIVE MG/DL
BNP SERPL-MCNC: 18 PG/ML (ref 0–99)
BODY TEMPERATURE: 37 DEGREES CELSIUS
BUN SERPL-MCNC: 24 MG/DL (ref 6–23)
CA-I BLDV-SCNC: 1.2 MMOL/L (ref 1.1–1.33)
CALCIUM SERPL-MCNC: 9.3 MG/DL (ref 8.6–10.3)
CARDIAC TROPONIN I PNL SERPL HS: 11 NG/L (ref 0–20)
CARDIAC TROPONIN I PNL SERPL HS: 13 NG/L (ref 0–20)
CHLORIDE BLDV-SCNC: 99 MMOL/L (ref 98–107)
CHLORIDE SERPL-SCNC: 99 MMOL/L (ref 98–107)
CO2 SERPL-SCNC: 27 MMOL/L (ref 21–32)
COLOR UR: COLORLESS
CREAT SERPL-MCNC: 1.31 MG/DL (ref 0.5–1.3)
D DIMER PPP FEU-MCNC: 354 NG/ML FEU
EGFRCR SERPLBLD CKD-EPI 2021: 55 ML/MIN/1.73M*2
EOSINOPHIL # BLD MANUAL: 0.17 X10*3/UL (ref 0–0.4)
EOSINOPHIL NFR BLD MANUAL: 1 %
ERYTHROCYTE [DISTWIDTH] IN BLOOD BY AUTOMATED COUNT: 11.7 % (ref 11.5–14.5)
GLUCOSE BLD MANUAL STRIP-MCNC: 166 MG/DL (ref 74–99)
GLUCOSE BLD MANUAL STRIP-MCNC: 201 MG/DL (ref 74–99)
GLUCOSE BLDV-MCNC: 169 MG/DL (ref 74–99)
GLUCOSE SERPL-MCNC: 163 MG/DL (ref 74–99)
GLUCOSE UR STRIP.AUTO-MCNC: NORMAL MG/DL
HCO3 BLDV-SCNC: 28.5 MMOL/L (ref 22–26)
HCT VFR BLD AUTO: 47.1 % (ref 41–52)
HCT VFR BLD EST: 53 % (ref 41–52)
HGB BLD-MCNC: 17.3 G/DL (ref 13.5–17.5)
HGB BLDV-MCNC: 17.7 G/DL (ref 13.5–17.5)
IMM GRANULOCYTES # BLD AUTO: 0.11 X10*3/UL (ref 0–0.5)
IMM GRANULOCYTES NFR BLD AUTO: 0.6 % (ref 0–0.9)
INHALED O2 CONCENTRATION: 21 %
KETONES UR STRIP.AUTO-MCNC: NEGATIVE MG/DL
LACTATE BLDV-SCNC: 1.6 MMOL/L (ref 0.4–2)
LACTATE BLDV-SCNC: 3 MMOL/L (ref 0.4–2)
LEUKOCYTE ESTERASE UR QL STRIP.AUTO: NEGATIVE
LYMPHOCYTES # BLD MANUAL: 5.33 X10*3/UL (ref 0.8–3)
LYMPHOCYTES NFR BLD MANUAL: 31 %
MCH RBC QN AUTO: 33.7 PG (ref 26–34)
MCHC RBC AUTO-ENTMCNC: 36.7 G/DL (ref 32–36)
MCV RBC AUTO: 92 FL (ref 80–100)
MONOCYTES # BLD MANUAL: 0.69 X10*3/UL (ref 0.05–0.8)
MONOCYTES NFR BLD MANUAL: 4 %
NEUTS SEG # BLD MANUAL: 11.01 X10*3/UL (ref 1.6–5)
NEUTS SEG NFR BLD MANUAL: 64 %
NITRITE UR QL STRIP.AUTO: NEGATIVE
NRBC BLD-RTO: 0 /100 WBCS (ref 0–0)
OXYHGB MFR BLDV: 61.4 % (ref 45–75)
P AXIS: 9 DEGREES
PCO2 BLDV: 42 MM HG (ref 41–51)
PH BLDV: 7.44 PH (ref 7.33–7.43)
PH UR STRIP.AUTO: 6 [PH]
PLATELET # BLD AUTO: 364 X10*3/UL (ref 150–450)
PO2 BLDV: 38 MM HG (ref 35–45)
POTASSIUM BLDV-SCNC: 3.2 MMOL/L (ref 3.5–5.3)
POTASSIUM SERPL-SCNC: 3.2 MMOL/L (ref 3.5–5.3)
PR INTERVAL: 167 MS
PROT SERPL-MCNC: 6.7 G/DL (ref 6.4–8.2)
PROT UR STRIP.AUTO-MCNC: NEGATIVE MG/DL
Q ONSET: 249 MS
QRS COUNT: 12 BEATS
QRS DURATION: 96 MS
QT INTERVAL: 393 MS
QTC CALCULATION(BAZETT): 436 MS
QTC FREDERICIA: 421 MS
R AXIS: -35 DEGREES
RBC # BLD AUTO: 5.14 X10*6/UL (ref 4.5–5.9)
RBC # UR STRIP.AUTO: NEGATIVE /UL
RBC MORPH BLD: ABNORMAL
SAO2 % BLDV: 66 % (ref 45–75)
SODIUM BLDV-SCNC: 132 MMOL/L (ref 136–145)
SODIUM SERPL-SCNC: 135 MMOL/L (ref 136–145)
SP GR UR STRIP.AUTO: 1.03
T AXIS: 61 DEGREES
T OFFSET: 446 MS
TOTAL CELLS COUNTED BLD: 100
UROBILINOGEN UR STRIP.AUTO-MCNC: NORMAL MG/DL
VENTRICULAR RATE: 74 BPM
WBC # BLD AUTO: 17.2 X10*3/UL (ref 4.4–11.3)

## 2024-07-16 PROCEDURE — 2500000001 HC RX 250 WO HCPCS SELF ADMINISTERED DRUGS (ALT 637 FOR MEDICARE OP): Performed by: EMERGENCY MEDICINE

## 2024-07-16 PROCEDURE — 84132 ASSAY OF SERUM POTASSIUM: CPT | Performed by: EMERGENCY MEDICINE

## 2024-07-16 PROCEDURE — 2060000001 HC INTERMEDIATE ICU ROOM DAILY

## 2024-07-16 PROCEDURE — G0378 HOSPITAL OBSERVATION PER HR: HCPCS

## 2024-07-16 PROCEDURE — 83880 ASSAY OF NATRIURETIC PEPTIDE: CPT | Performed by: EMERGENCY MEDICINE

## 2024-07-16 PROCEDURE — 70551 MRI BRAIN STEM W/O DYE: CPT

## 2024-07-16 PROCEDURE — 2500000002 HC RX 250 W HCPCS SELF ADMINISTERED DRUGS (ALT 637 FOR MEDICARE OP, ALT 636 FOR OP/ED)

## 2024-07-16 PROCEDURE — 96360 HYDRATION IV INFUSION INIT: CPT

## 2024-07-16 PROCEDURE — 99223 1ST HOSP IP/OBS HIGH 75: CPT

## 2024-07-16 PROCEDURE — 70547 MR ANGIOGRAPHY NECK W/O DYE: CPT

## 2024-07-16 PROCEDURE — 71045 X-RAY EXAM CHEST 1 VIEW: CPT | Mod: FOREIGN READ | Performed by: RADIOLOGY

## 2024-07-16 PROCEDURE — 71045 X-RAY EXAM CHEST 1 VIEW: CPT

## 2024-07-16 PROCEDURE — 85027 COMPLETE CBC AUTOMATED: CPT | Performed by: EMERGENCY MEDICINE

## 2024-07-16 PROCEDURE — 70551 MRI BRAIN STEM W/O DYE: CPT | Performed by: RADIOLOGY

## 2024-07-16 PROCEDURE — 85379 FIBRIN DEGRADATION QUANT: CPT | Performed by: EMERGENCY MEDICINE

## 2024-07-16 PROCEDURE — 87040 BLOOD CULTURE FOR BACTERIA: CPT | Mod: PORLAB | Performed by: EMERGENCY MEDICINE

## 2024-07-16 PROCEDURE — 84484 ASSAY OF TROPONIN QUANT: CPT | Performed by: EMERGENCY MEDICINE

## 2024-07-16 PROCEDURE — 71275 CT ANGIOGRAPHY CHEST: CPT

## 2024-07-16 PROCEDURE — 70547 MR ANGIOGRAPHY NECK W/O DYE: CPT | Performed by: RADIOLOGY

## 2024-07-16 PROCEDURE — 93005 ELECTROCARDIOGRAM TRACING: CPT

## 2024-07-16 PROCEDURE — 83605 ASSAY OF LACTIC ACID: CPT | Performed by: EMERGENCY MEDICINE

## 2024-07-16 PROCEDURE — 36415 COLL VENOUS BLD VENIPUNCTURE: CPT | Performed by: EMERGENCY MEDICINE

## 2024-07-16 PROCEDURE — 70450 CT HEAD/BRAIN W/O DYE: CPT | Performed by: RADIOLOGY

## 2024-07-16 PROCEDURE — 99223 1ST HOSP IP/OBS HIGH 75: CPT | Performed by: PSYCHIATRY & NEUROLOGY

## 2024-07-16 PROCEDURE — 71275 CT ANGIOGRAPHY CHEST: CPT | Mod: FOREIGN READ | Performed by: RADIOLOGY

## 2024-07-16 PROCEDURE — 2550000001 HC RX 255 CONTRASTS: Performed by: EMERGENCY MEDICINE

## 2024-07-16 PROCEDURE — 70544 MR ANGIOGRAPHY HEAD W/O DYE: CPT

## 2024-07-16 PROCEDURE — 70450 CT HEAD/BRAIN W/O DYE: CPT

## 2024-07-16 PROCEDURE — 2500000004 HC RX 250 GENERAL PHARMACY W/ HCPCS (ALT 636 FOR OP/ED): Performed by: EMERGENCY MEDICINE

## 2024-07-16 PROCEDURE — 81003 URINALYSIS AUTO W/O SCOPE: CPT | Performed by: EMERGENCY MEDICINE

## 2024-07-16 PROCEDURE — 2500000001 HC RX 250 WO HCPCS SELF ADMINISTERED DRUGS (ALT 637 FOR MEDICARE OP)

## 2024-07-16 PROCEDURE — 99285 EMERGENCY DEPT VISIT HI MDM: CPT | Mod: 25

## 2024-07-16 PROCEDURE — 82947 ASSAY GLUCOSE BLOOD QUANT: CPT

## 2024-07-16 PROCEDURE — 85007 BL SMEAR W/DIFF WBC COUNT: CPT | Performed by: EMERGENCY MEDICINE

## 2024-07-16 PROCEDURE — 2500000002 HC RX 250 W HCPCS SELF ADMINISTERED DRUGS (ALT 637 FOR MEDICARE OP, ALT 636 FOR OP/ED): Performed by: EMERGENCY MEDICINE

## 2024-07-16 PROCEDURE — 2500000004 HC RX 250 GENERAL PHARMACY W/ HCPCS (ALT 636 FOR OP/ED)

## 2024-07-16 RX ORDER — POTASSIUM CHLORIDE 750 MG/1
40 TABLET, FILM COATED, EXTENDED RELEASE ORAL ONCE
Status: COMPLETED | OUTPATIENT
Start: 2024-07-16 | End: 2024-07-16

## 2024-07-16 RX ORDER — PANTOPRAZOLE SODIUM 40 MG/1
40 TABLET, DELAYED RELEASE ORAL
Status: DISCONTINUED | OUTPATIENT
Start: 2024-07-17 | End: 2024-07-17 | Stop reason: HOSPADM

## 2024-07-16 RX ORDER — ASPIRIN 81 MG/1
81 TABLET ORAL DAILY
Status: DISCONTINUED | OUTPATIENT
Start: 2024-07-16 | End: 2024-07-17 | Stop reason: HOSPADM

## 2024-07-16 RX ORDER — BISACODYL 5 MG
10 TABLET, DELAYED RELEASE (ENTERIC COATED) ORAL DAILY PRN
Status: DISCONTINUED | OUTPATIENT
Start: 2024-07-16 | End: 2024-07-17 | Stop reason: HOSPADM

## 2024-07-16 RX ORDER — HYDRALAZINE HYDROCHLORIDE 20 MG/ML
10 INJECTION INTRAMUSCULAR; INTRAVENOUS
Status: DISCONTINUED | OUTPATIENT
Start: 2024-07-16 | End: 2024-07-16

## 2024-07-16 RX ORDER — BISACODYL 10 MG/1
10 SUPPOSITORY RECTAL DAILY PRN
Status: DISCONTINUED | OUTPATIENT
Start: 2024-07-16 | End: 2024-07-17 | Stop reason: HOSPADM

## 2024-07-16 RX ORDER — CEFUROXIME AXETIL 250 MG/1
500 TABLET ORAL 2 TIMES DAILY
Status: DISCONTINUED | OUTPATIENT
Start: 2024-07-16 | End: 2024-07-17 | Stop reason: HOSPADM

## 2024-07-16 RX ORDER — HYDRALAZINE HYDROCHLORIDE 25 MG/1
25 TABLET, FILM COATED ORAL 3 TIMES DAILY PRN
Status: DISCONTINUED | OUTPATIENT
Start: 2024-07-16 | End: 2024-07-16

## 2024-07-16 RX ORDER — ATORVASTATIN CALCIUM 40 MG/1
40 TABLET, FILM COATED ORAL NIGHTLY
Status: DISCONTINUED | OUTPATIENT
Start: 2024-07-16 | End: 2024-07-16

## 2024-07-16 RX ORDER — HYDRALAZINE HYDROCHLORIDE 25 MG/1
25 TABLET, FILM COATED ORAL EVERY 6 HOURS PRN
Status: DISCONTINUED | OUTPATIENT
Start: 2024-07-18 | End: 2024-07-16

## 2024-07-16 RX ORDER — MECLIZINE HCL 12.5 MG 12.5 MG/1
25 TABLET ORAL ONCE
Status: COMPLETED | OUTPATIENT
Start: 2024-07-16 | End: 2024-07-16

## 2024-07-16 RX ORDER — POLYETHYLENE GLYCOL 3350 17 G/17G
17 POWDER, FOR SOLUTION ORAL DAILY
Status: DISCONTINUED | OUTPATIENT
Start: 2024-07-16 | End: 2024-07-16

## 2024-07-16 RX ORDER — HYDRALAZINE HYDROCHLORIDE 25 MG/1
25 TABLET, FILM COATED ORAL 3 TIMES DAILY PRN
Status: DISCONTINUED | OUTPATIENT
Start: 2024-07-16 | End: 2024-07-17 | Stop reason: HOSPADM

## 2024-07-16 RX ORDER — LABETALOL HYDROCHLORIDE 5 MG/ML
10 INJECTION, SOLUTION INTRAVENOUS EVERY 10 MIN PRN
Status: DISCONTINUED | OUTPATIENT
Start: 2024-07-16 | End: 2024-07-17 | Stop reason: HOSPADM

## 2024-07-16 RX ORDER — POLYETHYLENE GLYCOL 3350 17 G/17G
17 POWDER, FOR SOLUTION ORAL DAILY
Status: DISCONTINUED | OUTPATIENT
Start: 2024-07-16 | End: 2024-07-17 | Stop reason: HOSPADM

## 2024-07-16 RX ORDER — ACETAMINOPHEN 325 MG/1
650 TABLET ORAL EVERY 4 HOURS PRN
Status: DISCONTINUED | OUTPATIENT
Start: 2024-07-16 | End: 2024-07-17 | Stop reason: HOSPADM

## 2024-07-16 RX ORDER — SODIUM CHLORIDE, SODIUM LACTATE, POTASSIUM CHLORIDE, CALCIUM CHLORIDE 600; 310; 30; 20 MG/100ML; MG/100ML; MG/100ML; MG/100ML
75 INJECTION, SOLUTION INTRAVENOUS CONTINUOUS
Status: ACTIVE | OUTPATIENT
Start: 2024-07-16 | End: 2024-07-17

## 2024-07-16 RX ORDER — PANTOPRAZOLE SODIUM 40 MG/10ML
40 INJECTION, POWDER, LYOPHILIZED, FOR SOLUTION INTRAVENOUS
Status: DISCONTINUED | OUTPATIENT
Start: 2024-07-17 | End: 2024-07-17 | Stop reason: HOSPADM

## 2024-07-16 RX ORDER — AMLODIPINE BESYLATE 5 MG/1
5 TABLET ORAL DAILY
Status: DISCONTINUED | OUTPATIENT
Start: 2024-07-16 | End: 2024-07-17 | Stop reason: HOSPADM

## 2024-07-16 RX ORDER — TALC
3 POWDER (GRAM) TOPICAL NIGHTLY PRN
Status: DISCONTINUED | OUTPATIENT
Start: 2024-07-16 | End: 2024-07-17 | Stop reason: HOSPADM

## 2024-07-16 RX ORDER — DOXYCYCLINE 100 MG/1
100 CAPSULE ORAL 2 TIMES DAILY
Status: DISCONTINUED | OUTPATIENT
Start: 2024-07-16 | End: 2024-07-17 | Stop reason: HOSPADM

## 2024-07-16 RX ORDER — ONDANSETRON HYDROCHLORIDE 2 MG/ML
4 INJECTION, SOLUTION INTRAVENOUS EVERY 6 HOURS PRN
Status: DISCONTINUED | OUTPATIENT
Start: 2024-07-16 | End: 2024-07-17 | Stop reason: HOSPADM

## 2024-07-16 RX ORDER — ATORVASTATIN CALCIUM 40 MG/1
40 TABLET, FILM COATED ORAL NIGHTLY
Status: DISCONTINUED | OUTPATIENT
Start: 2024-07-16 | End: 2024-07-17 | Stop reason: HOSPADM

## 2024-07-16 RX ORDER — GUAIFENESIN 600 MG/1
600 TABLET, EXTENDED RELEASE ORAL EVERY 12 HOURS PRN
Status: DISCONTINUED | OUTPATIENT
Start: 2024-07-16 | End: 2024-07-17 | Stop reason: HOSPADM

## 2024-07-16 SDOH — SOCIAL STABILITY: SOCIAL INSECURITY: WERE YOU ABLE TO COMPLETE ALL THE BEHAVIORAL HEALTH SCREENINGS?: YES

## 2024-07-16 SDOH — SOCIAL STABILITY: SOCIAL INSECURITY: HAVE YOU HAD THOUGHTS OF HARMING ANYONE ELSE?: NO

## 2024-07-16 ASSESSMENT — LIFESTYLE VARIABLES
SKIP TO QUESTIONS 9-10: 1
TOTAL SCORE: 0
HOW MANY STANDARD DRINKS CONTAINING ALCOHOL DO YOU HAVE ON A TYPICAL DAY: PATIENT DOES NOT DRINK
HOW OFTEN DO YOU HAVE A DRINK CONTAINING ALCOHOL: NEVER
AUDIT-C TOTAL SCORE: 0
SUBSTANCE_ABUSE_PAST_12_MONTHS: NO
PRESCIPTION_ABUSE_PAST_12_MONTHS: NO
HOW OFTEN DO YOU HAVE 6 OR MORE DRINKS ON ONE OCCASION: NEVER
EVER HAD A DRINK FIRST THING IN THE MORNING TO STEADY YOUR NERVES TO GET RID OF A HANGOVER: NO
EVER FELT BAD OR GUILTY ABOUT YOUR DRINKING: NO
HAVE YOU EVER FELT YOU SHOULD CUT DOWN ON YOUR DRINKING: NO
HAVE PEOPLE ANNOYED YOU BY CRITICIZING YOUR DRINKING: NO
AUDIT-C TOTAL SCORE: 0

## 2024-07-16 ASSESSMENT — ACTIVITIES OF DAILY LIVING (ADL)
LACK_OF_TRANSPORTATION: NO
HEARING - LEFT EAR: FUNCTIONAL
ASSISTIVE_DEVICE: EYEGLASSES;DENTURES LOWER;DENTURES UPPER
PATIENT'S MEMORY ADEQUATE TO SAFELY COMPLETE DAILY ACTIVITIES?: YES
GROOMING: INDEPENDENT
HEARING - LEFT EAR: FUNCTIONAL
FEEDING YOURSELF: INDEPENDENT
BATHING: INDEPENDENT
DRESSING YOURSELF: INDEPENDENT
GROOMING: INDEPENDENT
FEEDING YOURSELF: INDEPENDENT
ADEQUATE_TO_COMPLETE_ADL: YES
HEARING - RIGHT EAR: FUNCTIONAL
WALKS IN HOME: INDEPENDENT
TOILETING: INDEPENDENT
ADEQUATE_TO_COMPLETE_ADL: YES
BATHING: INDEPENDENT
WALKS IN HOME: INDEPENDENT
JUDGMENT_ADEQUATE_SAFELY_COMPLETE_DAILY_ACTIVITIES: YES
TOILETING: INDEPENDENT
HEARING - RIGHT EAR: FUNCTIONAL
DRESSING YOURSELF: INDEPENDENT
PATIENT'S MEMORY ADEQUATE TO SAFELY COMPLETE DAILY ACTIVITIES?: YES
JUDGMENT_ADEQUATE_SAFELY_COMPLETE_DAILY_ACTIVITIES: YES

## 2024-07-16 ASSESSMENT — COGNITIVE AND FUNCTIONAL STATUS - GENERAL
DAILY ACTIVITIY SCORE: 24
MOBILITY SCORE: 24
DAILY ACTIVITIY SCORE: 24
PATIENT BASELINE BEDBOUND: NO
MOBILITY SCORE: 24

## 2024-07-16 ASSESSMENT — COLUMBIA-SUICIDE SEVERITY RATING SCALE - C-SSRS
2. HAVE YOU ACTUALLY HAD ANY THOUGHTS OF KILLING YOURSELF?: NO
1. IN THE PAST MONTH, HAVE YOU WISHED YOU WERE DEAD OR WISHED YOU COULD GO TO SLEEP AND NOT WAKE UP?: NO
6. HAVE YOU EVER DONE ANYTHING, STARTED TO DO ANYTHING, OR PREPARED TO DO ANYTHING TO END YOUR LIFE?: NO

## 2024-07-16 ASSESSMENT — ENCOUNTER SYMPTOMS
WEAKNESS: 0
VOMITING: 0
HEADACHES: 0
SHORTNESS OF BREATH: 1
CHILLS: 0
COUGH: 0
CHEST TIGHTNESS: 0
JOINT SWELLING: 0
FEVER: 0
WHEEZING: 0
TREMORS: 0
CONSTIPATION: 0
DIZZINESS: 1
HEMATURIA: 0
WOUND: 0
DIARRHEA: 0
FATIGUE: 0
FLANK PAIN: 0
BACK PAIN: 0
PALPITATIONS: 0
ABDOMINAL PAIN: 0
NAUSEA: 1

## 2024-07-16 ASSESSMENT — PAIN - FUNCTIONAL ASSESSMENT
PAIN_FUNCTIONAL_ASSESSMENT: 0-10
PAIN_FUNCTIONAL_ASSESSMENT: 0-10

## 2024-07-16 ASSESSMENT — PAIN SCALES - GENERAL
PAINLEVEL_OUTOF10: 0 - NO PAIN
PAINLEVEL_OUTOF10: 2

## 2024-07-16 ASSESSMENT — PAIN DESCRIPTION - DESCRIPTORS: DESCRIPTORS: HEADACHE

## 2024-07-16 ASSESSMENT — VISUAL ACUITY: VA_NORMAL: 1

## 2024-07-16 NOTE — PROGRESS NOTES
Speech-Language Pathology                 Therapy Communication Note    Patient Name: Darius Roe  MRN: [id number]  Today's Date: 7/16/2024     Discipline: Speech Language Pathology    Missed Visit Reason: Missed Time Reason: Unavailable (Comment) (Pt is off the floor for testing)    Missed Time: Attempt    Comment:  Pt is currently off the floor for additional testing.  He did pass his original nursing swallow screen. However, would recommend repeating nursing swallow screen periodically based on current CT results and pending MRI results.     CT of head already completed =  IMPRESSION:  Wedge-shaped region of hypoattenuation of the inferior medial left  cerebellum suspicious for subacute ischemia. Brain MRI with without  contrast advised for further assessment.      Relative hyperdensity of the basilar artery, nonspecific and possibly  representing atherosclerotic changes, however consider CT angiogram  for further assessment and exclusion of underlying thrombus.    MRI is pending.    SLP will try again time and schedule permitting.

## 2024-07-16 NOTE — PROGRESS NOTES
Darius Roe is a 79 y.o. male admitted for No Principal Problem: There is no principal problem currently on the Problem List. Please update the Problem List and refresh.. Pharmacy reviewed the patient's [id number] medications and allergies for accuracy.    The list below reflects the PTA list prior to pharmacy medication history. A summary a changes to the PTA medication list has been listed below. Please review each medication in order reconciliation for additional clarification and justification.    Source of information:  Discharge paperwork 07/13/24    No Changes!    Medications added:    Medications modified:    Medications to be removed:    Medications of concern:      Prior to Admission Medications   Prescriptions Last Dose Informant Patient Reported? Taking?   amLODIPine (Norvasc) 5 mg tablet   Yes No   Sig: Take 1 tablet (5 mg) by mouth once daily.   apixaban (Eliquis) 5 mg tablet   Yes No   Sig: Take 1 tablet (5 mg) by mouth 2 times a day.  AS DIRECTED BY THE [facility] ([phone] EXT. [phone])   aspirin 81 mg chewable tablet   Yes No   Sig: Chew 1 tablet (81 mg) once daily.   atorvastatin (Lipitor) 40 mg tablet   No No   Sig: Take 1 tablet (40 mg) by mouth once daily at bedtime.   cefuroxime (Ceftin) 500 mg tablet   No No   Sig: Take 1 tablet (500 mg) by mouth 2 times a day for 5 days.   cholecalciferol (Vitamin D-3) 25 MCG (1000 UT) capsule   Yes No   Sig: Take 1 capsule (25 mcg) by mouth once daily. Take as directed   doxycycline (Vibramycin) 100 mg capsule   No No   Sig: Take 1 capsule (100 mg) by mouth 2 times a day for 5 days. Take with at least 8 ounces (large glass) of water, do not lie down for 30 minutes after   guaiFENesin (Mucinex) 600 mg 12 hr tablet   No No   Sig: Take 1 tablet (600 mg) by mouth every 12 hours if needed for congestion for up to 5 days. Do not crush, chew, or split.   losartan-hydrochlorothiazide (Hyzaar) 50-12.5 mg tablet   No No   Sig: Take 1 tablet by  mouth once daily.   multivitamin tablet   Yes No   Sig: Take 1 tablet by mouth once daily.   predniSONE (Deltasone) 20 mg tablet   No No   Sig: Take 2 tablets (40 mg) by mouth once daily for 3 doses.      Facility-Administered Medications: None       Sumaya Valdez

## 2024-07-16 NOTE — ED TRIAGE NOTES
Pt presenting for dizziness, SOB, and feeling lightheaded and off balance since Wednesday. Pt rpts he has been hypoxic with ambulation. Was recently seen and admitted for pneumonia per patient. Patient rpts symptoms have been persist since Wednesday and not improving.

## 2024-07-16 NOTE — H&P
"[facility] - GENERAL MEDICINE HISTORY AND PHYSICAL    History Obtained From: Patient    History Of Present Illness:  Darius Roe is a 79 y.o. male with PMHx s/f Afib on Eliquis, aortic regurgitation, HTN, BPV, CAD, diverticulosis, emphysema, HLD, CHRISTINE  presenting with dizziness. The patient states he had episodes of lightheadedness and worsening shortness of breath while playing horseshoes on Wednesday and was admitted for pneumonia from 07/11-07/13 and was discharged on 3 days of steroids, ceftin, and doxycycline. The patient got discharged but his dizziness remains unsolved. He notes that his dizziness is constant and he had trouble ambulating throughout the house. His dizziness comes on with standing and walking, he notes that he has hard time finding balance and had to \"hold onto railing the entire time for bathrooms\". He also has associated shortness of breath, nausea, and blurry vision with dizziness. He contacted his PCP who instructed him to come into ED for further evaluation. Per chart review, he has had chronic episodes of lightheadedness and hypotension for past years, follows a cardiologist and has had implantable loop recorder. His loop recorder has been unable to find anything besides occasional sinus pauses during the night associated with apneic events when he is not wearing his CPAP. He denies f/c, chest pain, abd pain, back pain, syncope, changes in urinary and bowel symptoms.    ED Course (Summary):   Vitals on presentation: 98.1 F, 98 bpm, 16 RR, 127/81, 95% on RA  Labs: Chemistries-glucose 163, sodium 135, potassium 3.2, chloride 99, BUN/creatinine 20/1.31, EGFR 55, BNP 18  Troponin 13>>11  D-dimer 354  CBC-leukocyte 7.2, hemoglobin 17.3, segs neutrophils 11.01  VBG-pH 7.44, pCO2 42, pO2 38, HCO3 28.5, lactate 3.0 >> 1.6  Urine analysis negative  Imaging: CXR - Resolving bibasilar infiltrates/atelectasis   CT head  - Wedge-shaped region of hypoattenuation of the inferior " medial left cerebellum suspicious for subacute ischemia. Brain MRI with without contrast advised for further assessment. Relative hyperdensity of the basilar artery, nonspecific and possibly representing atherosclerotic changes, however consider CT angiogram for further assessment and exclusion of underlying thrombus.  CTA for PE - negative PE. Prominent emphysematous disease of the lungs, most marked in the upper lobes. Prominent interstitial markings at the lung bases which may be due to passive congestion.  Focal mild atelectasis in the left lower lobe.  Interventions: LR bolus 500 mL, meclizine 25 mg, potassium chloride 40 mEq, admission for further management    ED Course (From Provider):  ED Course as of 07/16/24 1511   Tue Jul 16, 2024   1039 Patient twelve-lead EKG interpreted by myself shows sinus rhythm, ventricular 74, normal MI interval, left axis deviation, normal QRS duration, normal QT, no STEMI [WJ]   1041 LEUKOCYTES (10*3/UL) IN BLOOD BY AUTOMATED COUNT, Danish(!): 17.2  Possible sepsis noted at this time with the patient's lactic acid of 3.0 and WBC of 17.2.  This is a setting of taking steroids.  Last echocardiogram from August 23, 2023 reviewed showing EF of 50 to 55%, moderate aortic valve regurgitation.  Blood cultures, small amount of IV fluids ordered. [WJ]   1041 XR chest 1 view  Chest x-ray independently reviewed without obvious signs of a opacity or pneumothorax [WJ]   1102 D-Dimer, Quantitative VTE Exclusion: 354 [WJ]      ED Course User Index  [WJ] Nima Mcnair, DO         Diagnoses as of 07/16/24 1511   Dizziness   Cerebrovascular accident (CVA), unspecified mechanism (Multi)   Atrial fibrillation, unspecified type (Multi)   BENAVIDES (dyspnea on exertion)     Relevant Results  Results for orders placed or performed during the hospital encounter of 07/16/24 (from the past 24 hour(s))   CBC and Auto Differential   Result Value Ref Range    WBC 17.2 (H) 4.4 - 11.3 x10*3/uL    nRBC 0.0 0.0 -  0.0 /100 WBCs    RBC 5.14 4.50 - 5.90 x10*6/uL    Hemoglobin 17.3 13.5 - 17.5 g/dL    Hematocrit 47.1 41.0 - 52.0 %    MCV 92 80 - 100 fL    MCH 33.7 26.0 - 34.0 pg    MCHC 36.7 (H) 32.0 - 36.0 g/dL    RDW 11.7 11.5 - 14.5 %    Platelets 364 150 - 450 x10*3/uL    Immature Granulocytes %, Automated 0.6 0.0 - 0.9 %    Immature Granulocytes Absolute, Automated 0.11 0.00 - 0.50 x10*3/uL   Comprehensive Metabolic Panel   Result Value Ref Range    Glucose 163 (H) 74 - 99 mg/dL    Sodium 135 (L) 136 - 145 mmol/L    Potassium 3.2 (L) 3.5 - 5.3 mmol/L    Chloride 99 98 - 107 mmol/L    Bicarbonate 27 21 - 32 mmol/L    Anion Gap 12 10 - 20 mmol/L    Urea Nitrogen 24 (H) 6 - 23 mg/dL    Creatinine 1.31 (H) 0.50 - 1.30 mg/dL    eGFR 55 (L) >60 mL/min/1.73m*2    Calcium 9.3 8.6 - 10.3 mg/dL    Albumin 3.7 3.4 - 5.0 g/dL    Alkaline Phosphatase 71 33 - 136 U/L    Total Protein 6.7 6.4 - 8.2 g/dL    AST 16 9 - 39 U/L    Bilirubin, Total 0.7 0.0 - 1.2 mg/dL    ALT 30 10 - 52 U/L   B-Type Natriuretic Peptide   Result Value Ref Range    BNP 18 0 - 99 pg/mL   Blood Gas Venous Full Panel   Result Value Ref Range    POCT pH, Venous 7.44 (H) 7.33 - 7.43 pH    POCT pCO2, Venous 42 41 - 51 mm Hg    POCT pO2, Venous 38 35 - 45 mm Hg    POCT SO2, Venous 66 45 - 75 %    POCT Oxy Hemoglobin, Venous 61.4 45.0 - 75.0 %    POCT Hematocrit Calculated, Venous 53.0 (H) 41.0 - 52.0 %    POCT Sodium, Venous 132 (L) 136 - 145 mmol/L    POCT Potassium, Venous 3.2 (L) 3.5 - 5.3 mmol/L    POCT Chloride, Venous 99 98 - 107 mmol/L    POCT Ionized Calicum, Venous 1.20 1.10 - 1.33 mmol/L    POCT Glucose, Venous 169 (H) 74 - 99 mg/dL    POCT Lactate, Venous 3.0 (H) 0.4 - 2.0 mmol/L    POCT Base Excess, Venous 3.8 (H) -2.0 - 3.0 mmol/L    POCT HCO3 Calculated, Venous 28.5 (H) 22.0 - 26.0 mmol/L    POCT Hemoglobin, Venous 17.7 (H) 13.5 - 17.5 g/dL    POCT Anion Gap, Venous 8.0 (L) 10.0 - 25.0 mmol/L    Patient Temperature 37.0 degrees Celsius    FiO2 21 %    Troponin I, High Sensitivity, Initial   Result Value Ref Range    Troponin I, High Sensitivity 13 0 - 20 ng/L   Manual Differential   Result Value Ref Range    Neutrophils %, Manual 64.0 40.0 - 80.0 %    Lymphocytes %, Manual 31.0 13.0 - 44.0 %    Monocytes %, Manual 4.0 2.0 - 10.0 %    Eosinophils %, Manual 1.0 0.0 - 6.0 %    Basophils %, Manual 0.0 0.0 - 2.0 %    Seg Neutrophils Absolute, Manual 11.01 (H) 1.60 - 5.00 x10*3/uL    Lymphocytes Absolute, Manual 5.33 (H) 0.80 - 3.00 x10*3/uL    Monocytes Absolute, Manual 0.69 0.05 - 0.80 x10*3/uL    Eosinophils Absolute, Manual 0.17 0.00 - 0.40 x10*3/uL    Basophils Absolute, Manual 0.00 0.00 - 0.10 x10*3/uL    Total Cells Counted 100     RBC Morphology No significant RBC morphology present    D-dimer, VTE Exclusion   Result Value Ref Range    D-Dimer, Quantitative VTE Exclusion 354 <=500 ng/mL FEU   ECG 12 Lead   Result Value Ref Range    Ventricular Rate 74 BPM    Atrial Rate 75 BPM    WY Interval 167 ms    QRS Duration 96 ms    QT Interval 393 ms    QTC Calculation(Bazett) 436 ms    P Axis 9 degrees    R Axis -35 degrees    T Axis 61 degrees    QRS Count 12 beats    Q Onset 249 ms    T Offset 446 ms    QTC Fredericia 421 ms   Troponin, High Sensitivity, 1 Hour   Result Value Ref Range    Troponin I, High Sensitivity 11 0 - 20 ng/L   Blood Gas Lactic Acid, Venous   Result Value Ref Range    POCT Lactate, Venous 1.6 0.4 - 2.0 mmol/L   Urinalysis with Reflex Culture and Microscopic   Result Value Ref Range    Color, Urine Colorless (N) Light-Yellow, Yellow, Dark-Yellow    Appearance, Urine Clear Clear    Specific Gravity, Urine 1.026 1.005 - 1.035    pH, Urine 6.0 5.0, 5.5, 6.0, 6.5, 7.0, 7.5, 8.0    Protein, Urine NEGATIVE NEGATIVE, 10 (TRACE), 20 (TRACE) mg/dL    Glucose, Urine Normal Normal mg/dL    Blood, Urine NEGATIVE NEGATIVE    Ketones, Urine NEGATIVE NEGATIVE mg/dL    Bilirubin, Urine NEGATIVE NEGATIVE    Urobilinogen, Urine Normal Normal mg/dL    Nitrite,  Urine NEGATIVE NEGATIVE    Leukocyte Esterase, Urine NEGATIVE NEGATIVE      CT angio chest for pulmonary embolism    Result Date: 7/16/2024  STUDY: CT Angiogram of the Chest; 7/16/2024 11:16 AM INDICATION: Shortness of breath, hypoxia on ambulation, recent hospitalization. COMPARISON: XR chest 7/16/2024. ACCESSION NUMBER(S): [id number] ORDERING CLINICIAN: TINO DONAHUE TECHNIQUE:  CTA of the chest was performed with intravenous contrast. Images are reviewed and processed at a workstation according to the CT angiogram protocol with 3-D and/or MIP post processing imaging generated.  Omnipaque 350--75 mL was administered intravenously. Automated mA/kV exposure control was utilized and patient examination was performed in strict accordance with principles of ALARA. FINDINGS: Pulmonary arteries are adequately opacified without acute or chronic filling defects.  The thoracic aorta is normal in course and caliber without dissection or aneurysm. The heart is normal in size without pericardial effusion.  Thoracic lymph nodes are not enlarged. There is no pleural effusion, pleural thickening, or pneumothorax. The airways are patent. There is marked emphysematous disease in the upper lobes.  There is peripheral interstitial changes at both lung bases in addition to mild atelectasis at the left base.. Upper abdomen demonstrates no acute pathology. There are no acute fractures.  There is a mild compression deformity of the superior endplate of T8 which appears chronic.  No suspicious bony lesions.    No evidence of pulmonary embolism. Prominent emphysematous disease of the lungs, most marked in the upper lobes.. Prominent interstitial markings at the lung bases which may be due to passive congestion.  Focal mild atelectasis in the left lower lobe. Signed by Sandeep Arreola MD    ECG 12 Lead    Result Date: 7/16/2024  Sinus rhythm Inferior infarct, old See ED provider note for full interpretation and clinical correlation  Confirmed by Pastora Flores ([id number]) on 7/16/2024 12:05:28 PM    CT head wo IV contrast    Result Date: 7/16/2024  Interpreted By:  Asim Gill, STUDY: CT HEAD WO IV CONTRAST;  7/16/2024 11:16 am   INDICATION: Signs/Symptoms:dizziness since Friday.   COMPARISON: None.   ACCESSION NUMBER(S): [id number]   ORDERING CLINICIAN: TINO DONAHUE   TECHNIQUE: Noncontrast axial CT scan of head was performed. Angled reformats in brain and bone windows were generated. The images were reviewed in bone, brain, blood and soft tissue windows.   FINDINGS: CSF Spaces: The ventricles, sulci and basal cisterns are within normal limits. There is no extraaxial fluid collection.   Parenchyma:  Small region of wedge-shaped hypoattenuation along the inferior aspect of the left cerebellum medially. Relatively increased density of the basilar artery. Periventricular and subcortical white matter hypoattenuation is nonspecific, however likely reflects chronic microvascular ischemic versus chronic hypertensive changes. . There is no mass effect or midline shift.  There is no intracranial hemorrhage.   Calvarium: The calvarium is unremarkable.   Paranasal sinuses and mastoids: Visualized paranasal sinuses and mastoids are clear.       Wedge-shaped region of hypoattenuation of the inferior medial left cerebellum suspicious for subacute ischemia. Brain MRI with without contrast advised for further assessment.   Relative hyperdensity of the basilar artery, nonspecific and possibly representing atherosclerotic changes, however consider CT angiogram for further assessment and exclusion of underlying thrombus.       MACRO: Asim Gill discussed the significance and urgency of this critical finding by telephone with  TINO DONAHUE on 7/16/2024 at 11:50 am. (**-RCF-**) Findings:  See findings.         Signed by: Asim Gill 7/16/2024 11:51 AM Dictation workstation:   [id number]    XR chest 1 view    Result Date: 7/16/2024  STUDY: Chest  Radiograph;  7/16/2024, 10:27 INDICATION: Recent pneumonia. History of COPD. COMPARISON: XR chest 7/10/2024, 9/2/2023 ACCESSION NUMBER(S): [id number] ORDERING CLINICIAN: TINO DONAHUE TECHNIQUE:  Frontal chest was obtained at 10:27 hours. FINDINGS: CARDIOMEDIASTINAL SILHOUETTE: Cardiomediastinal silhouette is normal in size and configuration.  The loop recorder remains in place.  LUNGS: Resolving bibasilar infiltrates/atelectasis.  ABDOMEN: No remarkable upper abdominal findings.  BONES: No acute osseous changes.    Resolving bibasilar infiltrates/atelectasis. Signed by Al Valles MD    Scheduled medications:  [Held by provider] amLODIPine, 5 mg, oral, Daily  [Held by provider] apixaban, 5 mg, oral, BID  aspirin, 81 mg, oral, Daily  atorvastatin, 40 mg, oral, Nightly  [Held by provider] losartan 100 mg, hydroCHLOROthiazide 12.5 mg for Hyzaar 100 mg-12.5 mg, , oral, Daily  [START ON 7/17/2024] pantoprazole, 40 mg, oral, Daily before breakfast   Or  [START ON 7/17/2024] pantoprazole, 40 mg, intravenous, Daily before breakfast  perflutren protein A microsphere, 0.5 mL, intravenous, Once in imaging  polyethylene glycol, 17 g, oral, Daily  ticagrelor, 180 mg, oral, Once   Followed by  ticagrelor, 90 mg, oral, BID      Continuous medications:  lactated Ringer's, 75 mL/hr      PRN medications:  PRN medications: acetaminophen, bisacodyl, bisacodyl, guaiFENesin, hydrALAZINE, labetaloL, melatonin, ondansetron, oxygen     Past Medical History  He has a past medical history of 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    Surgical History  He has a past surgical history that includes Coronary angioplasty (06/12/2018); Other surgical history (07/16/2020); CT angio neck  (11/9/2021); MR angio neck w and wo IV contrast (11/10/2021); and CT angio head w and wo IV contrast (11/10/2021).     Social History  He reports that he has been smoking cigars. He has never used smokeless tobacco. He reports that he does not currently use alcohol after a past usage of about 1.0 standard drink of alcohol per week. He reports that he does not use drugs.    Family History  Family History   Problem Relation Name Age of Onset    Other (malignant neoplasm of ovary) Mother      Heart attack Mother      Other (cerebrovascular accident) Father      Other (diabetes mellitus) Father      Lung cancer Father      Other (metastatic to brain) Father      Pancreatic cancer Sister      Liver cancer Brother      Other (malignant neoplasm of prostate) Brother      Other (primary cervical cancer) Maternal Grandmother         Allergies  Ciprofloxacin    Code Status  Full Code     Review of Systems   Constitutional:  Negative for chills, fatigue and fever.   HENT:  Negative for congestion.    Respiratory:  Positive for shortness of breath. Negative for cough, chest tightness and wheezing.    Cardiovascular:  Negative for chest pain, palpitations and leg swelling.   Gastrointestinal:  Positive for nausea. Negative for abdominal pain, constipation, diarrhea and vomiting.   Genitourinary:  Negative for flank pain and hematuria.   Musculoskeletal:  Negative for back pain and joint swelling.   Skin:  Negative for rash and wound.   Neurological:  Positive for dizziness. Negative for tremors, syncope, weakness and headaches.       Last Recorded Vitals  /80 (BP Location: Left arm, Patient Position: Lying)   Pulse 75   Temp 36.8 °C (98.2 °F) (Temporal)   Resp 19   Wt 74.8 kg (165 lb)   SpO2 95%      Physical Exam:  Vital signs and nursing notes reviewed.   Constitutional: Pleasant and cooperative. Laying in bed in no acute distress. Conversant.   Skin: Warm and dry; no obvious lesions, rashes, pallor, or jaundice.  Good turgor.   Eyes: EOMI. Anicteric sclera.   ENT: Mucous membranes moist; no obvious injury or deformity appreciated.   Head and Neck: Normocephalic, atraumatic. ROM preserved. Trachea midline. No appreciable JVD.   Respiratory: Nonlabored on RA. Decreased lung sounds in the upper lobes and crackles at bilateral bases. Chest rise is equal.  Cardiovascular: RRR. No gross murmur, gallop, or rub. Extremities are warm and well-perfused with good capillary refill (< 3 seconds). No chest wall tenderness.   GI: Abdomen soft, nontender, nondistended. No obvious organomegaly appreciated. Bowel sounds are present and normoactive.  : No CVA tenderness.   MSK: No gross abnormalities appreciated. No limitations to AROM/PROM appreciated.   Extremities: No cyanosis, edema, or clubbing evident. Neurovascularly intact.   Neuro: A&Ox3. CN 2-12 grossly intact. Slight facial droop in the right, right smile has slight droop compared to left. Equal sensory and strength in bilateral upper and lower extremities. No motor drift. No dysarthria. Able to respond to questions appropriately and clearly. Gait unsteadiness.  Psych: Appropriate mood and behavior.    Assessment/Plan   Principal Problem:    CVA (cerebral vascular accident) (Multi)  Active Problems:    CAD (coronary artery disease)    A-fib (Multi)    Hyperlipidemia    Atherosclerosis of native coronary artery of native heart without angina pectoris    Hyperglycemia    SHANTA (acute kidney injury) (CMS-HCC)    Leukocytosis    Dizziness    Metabolic alkalosis    Hypokalemia    CVA  Metabolic alkalosis  -CT head: Wedge-shaped region of hypoattenuation of the inferior medial left cerebellum suspicious for subacute ischemia.  -MRI Brain w/o contrast + MRA Head and Neck   -Echocardiogram with bubble study  -Telemetry   -Labs: lipid panel, hemoglobin A1c  -ASA 81 mg & Brilinta 180 mg then 90 mg + Statin   -avoid hypotension SBP <100 that could worsen cerebral perfusion  -early permissive  hypertension SBP < 220 mmHg; hydralazine and labetalol PRN for SBP >220  -hold home norvasc, losartan/hydrochlorothiazide  -PT/OT/SLP   -Neurology consultation, appreciate further recommendations    Hyperglycemia   -no history of diabetes  -stress hyperglycemia secondary to stroke  -HbA1c pending  -monitor and adjust with SSI in AM as necessary    Hypokalemia  -K 3.2 --> given Klor-Con 40 mEq in ED --> recheck in AM  -monitor and replenish as necessary    SHANTA   -Baseline serum creatinine: ~0.95-1.10  -Creatinine at admission: 1.31  -Suspect secondary to decrease renal perfusion  -IVF: LR maintenance fluids for 14 hrs  -hold home norvasc, losartan/hydrochlorothiazide  -Recheck renal function panel in AM   -Nephrology consultation if not improving, appreciate further recommendations    leukocytosis   -CXR showed resolving bibasilar infiltrates/atelectasis  -possible secondary to steroids use  -Lactate VBG 3.0 >> 1.6  -pt vitals are stable, and he notes of SOB only with dizziness; will continue his home oral ceftin and doxy for more 2 days as prescribed on discharge on 7/13, can consider IV abx if acute changes arise  -blood cultures pending  -follow WBC and fever curve    HLD/CAD  -statin, ASA    Hx afib  -hold Eliquis, continue on day 1 of admission    Diet: NPO until swallow eval  DVT Prophylaxis: hold eliquis  Code Status: full code     Lonnie Mina PA-C    Dragon dictation software was used to dictate this note and thus there may be minor errors in translation/transcription including garbled speech or misspellings. Please contact for clarification if needed.

## 2024-07-16 NOTE — ED PROVIDER NOTES
HPI   Chief Complaint   Patient presents with    Dizziness/SOB       HPI    HISTORY OF PRESENT ILLNESS:  Patient is a 79-year-old male presents to the emergency department with dizziness.  Patient states that he had presented to the emergency department July 10 with dizziness in addition to shortness of breath.  At that time, patient had a hospitalization between July 10 to July 13 for pneumonia, discharged on 3 days of steroids, Ceftin, doxycycline. Patient was discharged but states that he continued to feel dizzy, with trouble ambulating.  He also has been short feeling short of breath.  States that he had finished his antibiotics this morning.  [facility] was concerned that he may have had a stroke and was instructed to come back to be reevaluated.    Past Medical History: A-fib on Eliquis, aortic regurgitation, hypertension, BPV, CAD, diverticulosis, emphysema, hyperlipidemia, CHRISTINE, remote vertigo    __________________________________________________________  PHYSICAL EXAM:    Appearance: Alert, oriented , cooperative   Skin: Intact,  dry skin, no lesions, rash, petechiae or purpura.   Eyes: PERRLA, EOMs intact,  Conjunctiva pink with no redness or exudates.    HENT: Normocephalic, atraumatic. Nares patent   Neck: Supple. Trachea at midline.   Pulmonary: Lung sounds are clear bilaterally.  There is no rales, rhonchi, or wheezing.  Cardiac: Regular rate and rhythm, no rubs, murmurs, or gallops. No JVD,   Abdomen: Abdomen is soft, nontender, and nondistended.  No palpable organomegaly.  No rebound or guarding.  No CVA tenderness. Nonsurgical abdomen  Genitourinary: Exam deferred.  Musculoskeletal: no edema, pain, cyanosis, or deformity in extremities. Pulses full and equal.   Neurological:  Cranial nerves are grossly intact, grossly normal sensation, no weakness, no focal findings identified.  No nystagmus noted.    __________________________________________________________  MEDICAL DECISION  MAKING:    Patient was seen and examined. Differential diagnosis for Ongoing dizziness includes infection such as urinary tract infection, pneumonia.  Patient currently has clear lung sounds not concerning for COPD exacerbation.  He is presenting with dizziness which she states is the same presentation as patient's ED visit on July 10.  Patient currently has no fever or tachycardia.  Workup was initiated.  Patient did not have any obvious focal neurologic deficits.    Patient CT head scan did show likely subacute infarct.  Patient laboratory workup did show a leukocytosis of 17.  Patient was also hypokalemic and orally repleted.  I did perform a CT PE study due to the leukocytosis for further evaluation of occult pneumonia.  CT was negative for pulmonary embolism but did show emphysematous lungs in addition to prominent interstitial markings which could be passive congestion.  Patient initial lactic acid did clear from 3.0-1.6 after small IV fluid bolus.  I did order MRI imaging while waiting for CT PE study.  Case discussed with IMS who agreed to have patient admitted.  Patient is not a TNK candidate for this possible subacute stroke as there are ischemic changes already in patients outside of the window for thrombolytics.      Chronic Medical Conditions Significantly Affecting Care: A-fib on Eliquis, aortic regurgitation, hypertension, BPV, CAD, diverticulosis, emphysema, hyperlipidemia, CHRISTINE    External Records Reviewed: I reviewed recent and relevant outside records including: Discharge summary from July 13, 2024    [facility]  Emergency Medicine    Patient History   Past Medical History:   Diagnosis Date    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 root (CMS-HCC)     Atrial fibrillation (Multi)     BPH (benign prostatic hyperplasia) 05/28/2018    CAD (coronary artery disease)     Diverticulosis 05/28/2018    Hypertension     NSTEMI (non-ST elevated myocardial infarction) (Multi) 05/28/2018     Past  Surgical History:   Procedure Laterality Date    CORONARY ANGIOPLASTY  06/12/2018    PTCA    CT ANGIO NECK  11/9/2021    CT NECK ANGIO W AND WO IV CONTRAST 11/9/2021 [facility]    CT HEAD ANGIO W AND WO IV CONTRAST  11/10/2021    CT HEAD ANGIO W AND WO IV CONTRAST 11/10/2021 [facility]    MR NECK ANGIO W AND WO IV CONTRAST  11/10/2021    MR NECK ANGIO W AND WO IV CONTRAST 11/10/2021 [facility]    OTHER SURGICAL HISTORY  07/16/2020    Knee surgery     Family History   Problem Relation Name Age of Onset    Other (malignant neoplasm of ovary) Mother      Heart attack Mother      Other (cerebrovascular accident) Father      Other (diabetes mellitus) Father      Lung cancer Father      Other (metastatic to brain) Father      Pancreatic cancer Sister      Liver cancer Brother      Other (malignant neoplasm of prostate) Brother      Other (primary cervical cancer) Maternal Grandmother       Social History     Tobacco Use    Smoking status: Some Days     Types: Cigars    Smokeless tobacco: Never   Substance Use Topics    Alcohol use: Not Currently     Alcohol/week: 1.0 standard drink of alcohol     Types: 1 Cans of beer per week    Drug use: Never       Physical Exam   ED Triage Vitals [07/16/24 1001]   Temperature Heart Rate Respirations BP   36.7 °C (98.1 °F) 98 16 127/81      Pulse Ox Temp src Heart Rate Source Patient Position   95 % -- -- --      BP Location FiO2 (%)     -- --       Physical Exam      ED Course & [facility]   ED Course as of 07/16/24 1517   Tue Jul 16, 2024   1039 Patient twelve-lead EKG interpreted by myself shows sinus rhythm, ventricular 74, normal VT interval, left axis deviation, normal QRS duration, normal QT, no STEMI [WJ]   1041 LEUKOCYTES (10*3/UL) IN BLOOD BY AUTOMATED COUNT, NAVI(!): 17.2  Possible sepsis noted at this time with the patient's lactic acid of 3.0 and WBC of 17.2.  This is a setting of taking steroids.  Last echocardiogram from August 23, 2023  reviewed showing EF of 50 to 55%, moderate aortic valve regurgitation.  Blood cultures, small amount of IV fluids ordered. [WJ]   1041 XR chest 1 view  Chest x-ray independently reviewed without obvious signs of a opacity or pneumothorax [WJ]   1102 D-Dimer, Quantitative VTE Exclusion: 354 [WJ]      ED Course User Index  [WJ] Nima Mcnair DO         Diagnoses as of 07/16/24 1517   Dizziness   Cerebrovascular accident (CVA), unspecified mechanism (Multi)   Atrial fibrillation, unspecified type (Multi)   BENAVIDES (dyspnea on exertion)                       Jose Coma Scale Score: 15                        Medical Decision Making      Procedure  Procedures     Nima Mcnair DO  07/16/24 1522

## 2024-07-16 NOTE — CONSULTS
"[facility] Consult Note    Inpatient consult to Neurology  Consult performed by: Allyson Bucio, HARPER-CNP  Consult ordered by: Lonnie Mina PA-C         Subjective   Darius Roe is a 79 y.o. male on day 0 of admission with a history of afib, HTN, CAD, HLD and CHRISTINE presenting with CVA (cerebral vascular accident) (Multi). Neurology was consulted for stroke.    History obtained from patient and chart review.     The patient states he had episodes of lightheadedness and worsening shortness of breath while playing horseshoes on Wednesday and was admitted for pneumonia from 07/11-07/13 and was discharged on 3 days of steroids, ceftin, and doxycycline. The patient got discharged but his dizziness remains unsolved. He notes that his dizziness is constant and he had trouble ambulating throughout the house. His dizziness comes on with standing and walking, he notes that he has hard time finding balance and had to \"hold onto railing the entire time for bathrooms\". He also has associated shortness of breath, nausea, and blurry vision with dizziness. He contacted his PCP who instructed him to come into ED for further evaluation.     Per chart review, he has had chronic episodes of lightheadedness and hypotension for past years, follows a cardiologist and has had implantable loop recorder. His loop recorder has been unable to find anything besides occasional sinus pauses during the night associated with apneic events when he is not wearing his CPAP.     [facility] Dr. Lewis with cardiology here.     BP in the ED was 127/81. He is currently on eliquis and 81 mg aspirin daily at home. HCT showed wedge-shaped region of hypoattenuation of the inferior medial left cerebellum suspicious for subacute ischemia.     MRI brain wo contrast shows small acute infarct to posterior L cerebellum, no mass effect or hemorrhage. MRA head and neck unremarkable.     Past Medical History:   Diagnosis Date    " 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 root (CMS-HCC)     Atrial fibrillation (Multi)     BPH (benign prostatic hyperplasia) 05/28/2018    CAD (coronary artery disease)     Diverticulosis 05/28/2018    Hypertension     NSTEMI (non-ST elevated myocardial infarction) (Multi) 05/28/2018     Past Surgical History:   Procedure Laterality Date    CORONARY ANGIOPLASTY  06/12/2018    PTCA    CT ANGIO NECK  11/9/2021    CT NECK ANGIO W AND WO IV CONTRAST 11/9/2021 [facility]    CT HEAD ANGIO W AND WO IV CONTRAST  11/10/2021    CT HEAD ANGIO W AND WO IV CONTRAST 11/10/2021 [facility]    MR NECK ANGIO W AND WO IV CONTRAST  11/10/2021    MR NECK ANGIO W AND WO IV CONTRAST 11/10/2021 [facility]    OTHER SURGICAL HISTORY  07/16/2020    Knee surgery     No relevant family history has been documented for this patient.   Social History     Substance and Sexual Activity   Drug Use Never     Tobacco Use: High Risk (7/10/2024)    Patient History     Smoking Tobacco Use: Some Days     Smokeless Tobacco Use: Never     Passive Exposure: Not on file     Alcohol Use: Not At Risk (7/16/2024)    AUDIT-C     Frequency of Alcohol Consumption: Never     Average Number of Drinks: Patient does not drink     Frequency of Binge Drinking: Never       10 point review of systems performed and is negative except as noted in the HPI.        Objective   Vitals:    07/16/24 1130 07/16/24 1230 07/16/24 1300 07/16/24 1416   BP: 132/72 164/76 130/79 168/80   BP Location:    Left arm   Patient Position:    Lying   Pulse: 65 67 67 75   Resp: 16 15 18 19   Temp:    36.8 °C (98.2 °F)   TempSrc:    Temporal   SpO2: 96% 94% (!) 93% 95%   Weight:       Height:             Physical Exam  Vitals reviewed.   Eyes:      General: Lids are normal.      Extraocular Movements: Extraocular movements intact.      Pupils: Pupils are equal, round, and reactive to light.   Neurological:      Motor: Motor strength is normal.     Deep Tendon  Reflexes:      Reflex Scores:       Bicep reflexes are 2+ on the right side and 2+ on the left side.       Brachioradialis reflexes are 2+ on the right side and 2+ on the left side.       Patellar reflexes are 2+ on the right side and 2+ on the left side.  Psychiatric:         Speech: Speech normal.       Neurological Exam  Mental Status  Awake, alert and oriented to person, place and time. Speech is normal. Language is fluent with no aphasia.    Cranial Nerves  CN II: Visual acuity is normal. Visual fields full to confrontation.  CN III, IV, VI: Extraocular movements intact bilaterally. Normal lids and orbits bilaterally. Pupils equal round and reactive to light bilaterally.  CN V: Facial sensation is normal.  CN VII: Full and symmetric facial movement.  CN VIII: Hearing is normal.  CN IX, X: Palate elevates symmetrically. Normal gag reflex.  CN XI: Shoulder shrug strength is normal.  CN XII: Tongue midline without atrophy or fasciculations.    Motor  Normal muscle bulk throughout. No fasciculations present. Normal muscle tone. No abnormal involuntary movements. Strength is 5/5 throughout all four extremities.    Sensory  Sensation is intact to light touch, pinprick, vibration and proprioception in all four extremities.    Reflexes                                            Right                      Left  Brachioradialis                    2+                         2+  Biceps                                 2+                         2+  Patellar                                2+                         2+    Coordination  Right: Finger-to-nose normal.Left: Finger-to-nose normal.    Gait    Not tested.      Scheduled medications  [Held by provider] amLODIPine, 5 mg, oral, Daily  [Held by provider] apixaban, 5 mg, oral, BID  aspirin, 81 mg, oral, Daily  atorvastatin, 40 mg, oral, Nightly  cefuroxime, 500 mg, oral, BID  doxycycline, 100 mg, oral, BID  [Held by provider] losartan 100 mg, hydroCHLOROthiazide 12.5 mg  for Hyzaar 100 mg-12.5 mg, , oral, Daily  [START ON 7/17/2024] pantoprazole, 40 mg, oral, Daily before breakfast   Or  [START ON 7/17/2024] pantoprazole, 40 mg, intravenous, Daily before breakfast  perflutren protein A microsphere, 0.5 mL, intravenous, Once in imaging  polyethylene glycol, 17 g, oral, Daily  ticagrelor, 90 mg, oral, BID      Continuous medications  lactated Ringer's, 75 mL/hr, Last Rate: 75 mL/hr (07/16/24 1611)      PRN medications  PRN medications: acetaminophen, bisacodyl, bisacodyl, guaiFENesin, hydrALAZINE, labetaloL, melatonin, ondansetron, oxygen     Lab Results   Component Value Date    WBC 17.2 (H) 07/16/2024    RBC 5.14 07/16/2024    HGB 17.3 07/16/2024    HCT 47.1 07/16/2024     07/16/2024     (L) 07/16/2024    K 3.2 (L) 07/16/2024    CL 99 07/16/2024    BUN 24 (H) 07/16/2024    CREATININE 1.31 (H) 07/16/2024    EGFR 55 (L) 07/16/2024    CALCIUM 9.3 07/16/2024    ALKPHOS 71 07/16/2024    AST 16 07/16/2024    ALT 30 07/16/2024    MG 1.69 07/10/2024    LDLDIRECT 102 04/14/2021    CHOL 157 04/14/2021    HDL 34.9 (A) 04/14/2021    TRIG 222 (H) 04/14/2021    TSH 2.01 12/31/2019       Below CT and MRI images and reports have been personally reviewed in PACS, agree with interpretations.    MR brain wo IV contrast 07/16/2024  MR angio neck wo IV contrast 07/16/2024  MR angio head wo IV contrast 07/16/2024    Narrative  Interpreted By:  Sandeep Chang,  STUDY:  MR BRAIN WO IV CONTRAST; MR ANGIO HEAD WO IV CONTRAST; MR ANGIO NECK  WO IV CONTRAST;  7/16/2024 3:44 pm    INDICATION:  Signs/Symptoms:dizziness, concern for stroke.    COMPARISON:  CT head dated 07/16/2020. MRI brain dated 11/10/2021. CTA head and  neck dated 11/10/2021.    ACCESSION NUMBER(S):  [id number]; [id number]; [id number]    ORDERING CLINICIAN:  TINO DONAHUE    TECHNIQUE:  1) Standard multiplanar multisequence MR imaging was performed  through the brain without intravenous contrast.  2) Noncontrast 3D  time-of-flight MRA imaging was performed through  the brain.  3) Noncontrast 2D time-of-flight MRA imaging was performed through  the neck.    FINDINGS:  BRAIN:    Parenchyma: There is a tiny acute infarct involving the posterior  cortex of the left cerebellar hemisphere (series 204, image 80) with  corresponding T2/FLAIR hyperintensity. There is no mass effect or  hemorrhagic transformation. Tiny focus of T2 shine through suggested  within the right cerebellar hemisphere (series 204, image 88). There  is also a small focus of T2 shine through suggested within the  subcortical superior left frontal lobe (series 204, image 240). No  evidence of recent hemorrhage. There is no mass effect or midline  shift. There is moderate chronic small vessel ischemic disease.  Additional remote bilateral cerebellar infarct components. Small  remote left basal ganglia lacunar infarct near the genu of the  internal capsule.    CSF Spaces: The ventricles, sulci and basal cisterns are within  normal limits for age with moderate generalized brain atrophy.  Basilar cisterns are patent.    Extra-axial spaces: No extra-axial fluid collection.    Paranasal Sinuses: Visualized paranasal sinuses are clear.    Mastoids: Clear.    Orbits: Bilateral native lens extractions.    Calvarium: No suspicious osseous marrow signal.      VASCULAR FINDINGS:    Common carotid arteries: Patent flow signal within the visualized  portions.    External carotid arteries: Patent flow signal bilaterally.    Internal carotid arteries: Patent flow signal bilaterally. Mild  atherosclerotic narrowing of the internal carotid artery origins with  no NASCET significant stenosis. Mild tortuosity of the cervical  internal carotid artery segments bilaterally. The right internal  carotid artery is slightly larger in caliber relative to the left.  The intracranial segments appear patent without flow significant  stenosis or definite aneurysm.    Anterior cerebral arteries:  Hypoplastic versus absent left A1  segment. Otherwise normal flow signal bilaterally.    Middle cerebral arteries: Normal flow signal bilaterally.    Vertebral arteries: The visualized portions demonstrate patent flow  signal bilaterally with no flow significant focal stenosis. The right  vertebral artery is mildly dominant.    Basilar artery: Normal flow signal.    Posterior communicating arteries: Not well visualized, diminutive  versus absent.    Posterior cerebral arteries: Normal flow signal bilaterally.    Impression  Tiny acute infarct involving the posterior cortex of the left  cerebellum. No mass effect or hemorrhagic transformation.    Moderate chronic small vessel ischemic disease with additional  bilateral remote cerebellar infarcts and additional remote ischemic  insults as described in detail above.    No evidence of source vessel arterial occlusion, flow significant  stenosis, or aneurysm within the head and neck. Anatomic variants as  described in detail above, similar to remote CTA.    MACRO:  Critical Finding:  Acute infarct. Notification was initiated on  7/16/2024 at 3:58 pm by  Sandeep Chang.  (**-OCF-**)    Signed by: Sandeep Chang 7/16/2024 3:58 PM  Dictation workstation:   [id number]      CT head wo IV contrast 07/16/2024    Narrative  Interpreted By:  Asim Gill,  STUDY:  CT HEAD WO IV CONTRAST;  7/16/2024 11:16 am    INDICATION:  Signs/Symptoms:dizziness since Friday.    COMPARISON:  None.    ACCESSION NUMBER(S):  [id number]    ORDERING CLINICIAN:  TINO DONAHUE    TECHNIQUE:  Noncontrast axial CT scan of head was performed. Angled reformats in  brain and bone windows were generated. The images were reviewed in  bone, brain, blood and soft tissue windows.    FINDINGS:  CSF Spaces: The ventricles, sulci and basal cisterns are within  normal limits. There is no extraaxial fluid collection.    Parenchyma:  Small region of wedge-shaped hypoattenuation along the  inferior aspect of the  left cerebellum medially. Relatively increased  density of the basilar artery. Periventricular and subcortical white  matter hypoattenuation is nonspecific, however likely reflects  chronic microvascular ischemic versus chronic hypertensive changes. .  There is no mass effect or midline shift.  There is no intracranial  hemorrhage.    Calvarium: The calvarium is unremarkable.    Paranasal sinuses and mastoids: Visualized paranasal sinuses and  mastoids are clear.    Impression  Wedge-shaped region of hypoattenuation of the inferior medial left  cerebellum suspicious for subacute ischemia. Brain MRI with without  contrast advised for further assessment.    Relative hyperdensity of the basilar artery, nonspecific and possibly  representing atherosclerotic changes, however consider CT angiogram  for further assessment and exclusion of underlying thrombus.        MACRO:  Asim Gill discussed the significance and urgency of this critical  finding by telephone with  TINO DONAHUE on 7/16/2024 at 11:50 am.  (**-RCF-**) Findings:  See findings.          Signed by: Asim Gill 7/16/2024 11:51 AM  Dictation workstation:   [id number]      NIH Stroke Scale  1A. Level of Consciousness: Alert, Keenly Responsive  1B. Ask Month and Age: Both Questions Right  1C. Blink Eyes & Squeeze Hands: Performs Both Tasks  2. Best Gaze: Normal  3. Visual: No Visual Loss  4. Facial Palsy: Normal Symmetrical Movements  5A. Motor - Left Arm: No Drift  5B. Motor - Right Arm: No Drift  6A. Motor - Left Leg: No Drift  6B. Motor - Right Leg: No Drift  7. Limb Ataxia: Absent  8. Sensory Loss: Normal  9. Best Language: No Aphasia  10. Dysarthria: Normal  11. Extinction and Inattention: No Abnormality  NIH Stroke Scale: 0      [place] Coma Scale  Best Eye Response: Spontaneous  Best Verbal Response: Oriented  Best Motor Response: Follows commands  Jose Coma Scale Score: 15        Assessment/Plan   This patient currently has cardiac telemetry  ordered; if you would like to modify or discontinue the telemetry order, click here to go to the orders activity to modify/discontinue the order.  Principal Problem:    CVA (cerebral vascular accident) (Multi)  Active Problems:    CAD (coronary artery disease)    A-fib (Multi)    Hyperlipidemia    Atherosclerosis of native coronary artery of native heart without angina pectoris    Hyperglycemia    SHANTA (acute kidney injury) (CMS-HCC)    Leukocytosis    Dizziness    Metabolic alkalosis    Hypokalemia      IMPRESSION:  Darius Roe is a 79 y.o. male with a history of afib, HTN, CAD, HLD and CHRISTINE presenting for cerebellar stroke and ongoing dizziness. On eliquis and aspririn daily outpatient.     Acute L cerebellar stroke  Dizziness, only with standing    RECOMMENDATIONS:  Continue supportive care.   Continue eliquis and aspirin  Continue HI statin.   Continue to monitor and manage stroke risks.   TTE pending  Get orthostatic vitals.  PT/OT evaluation    Discussed with patient.   Seen with Dr. Heard.   Will continue to follow.        I personally spent 75 minutes today, exclusive of procedures, providing care for this patient, including preparation, face to face time, documentation and other services such as review of medical records, diagnostic result, patient education, counseling, coordination of care as specified in the encounter.    Allyson Bucio, HARPER-CNP    I agree with the above.  I saw and examined the patient with the NP and was present for the entirety of the clinical encounter.  The note above has been edited as appropriate.  The plan of care was mostly mine with input from the NP.    The patient had the onset of dizziness several days ago and was subsequently sent by his primary care doctor for admission.  The patient states that he is still having some mild dizziness and that his walking is still mildly unsteady.  The MRI of the brain does show cerebellar stroke.  The differential diagnosis for  stroke includes intra or extracranial atherosclerotic disease, small vessel stroke or a cardiac source of embolism.  The patient needs a ALVIN and an orthostatic blood pressure and heart rate checked.  I would certainly continue all of his medicines and stroke risk factor modification.  I discussed all these issues in detail with the patient and answered all of his questions.  I will continue to follow the patient while he is in hospital.  On discharge, the patient will follow-up with his primary care doctor in 2 weeks and with the neurology NP in 1 month.

## 2024-07-17 ENCOUNTER — PHARMACY VISIT (OUTPATIENT)
Dept: PHARMACY | Facility: CLINIC | Age: 79
End: 2024-07-17

## 2024-07-17 ENCOUNTER — APPOINTMENT (OUTPATIENT)
Dept: CARDIOLOGY | Facility: HOSPITAL | Age: 79
DRG: 064 | End: 2024-07-17
Payer: MEDICARE

## 2024-07-17 VITALS
DIASTOLIC BLOOD PRESSURE: 76 MMHG | RESPIRATION RATE: 19 BRPM | SYSTOLIC BLOOD PRESSURE: 125 MMHG | HEIGHT: 70 IN | BODY MASS INDEX: 23.32 KG/M2 | WEIGHT: 162.92 LBS | HEART RATE: 69 BPM | TEMPERATURE: 99.1 F | OXYGEN SATURATION: 90 %

## 2024-07-17 PROBLEM — E87.3 METABOLIC ALKALOSIS: Status: RESOLVED | Noted: 2024-07-16 | Resolved: 2024-07-17

## 2024-07-17 PROBLEM — E87.6 HYPOKALEMIA: Status: RESOLVED | Noted: 2024-07-16 | Resolved: 2024-07-17

## 2024-07-17 PROBLEM — R42 DIZZINESS: Status: RESOLVED | Noted: 2024-07-16 | Resolved: 2024-07-17

## 2024-07-17 PROBLEM — R73.9 HYPERGLYCEMIA: Status: RESOLVED | Noted: 2024-07-11 | Resolved: 2024-07-17

## 2024-07-17 PROBLEM — D72.829 LEUKOCYTOSIS: Status: RESOLVED | Noted: 2024-07-11 | Resolved: 2024-07-17

## 2024-07-17 PROBLEM — N17.9 AKI (ACUTE KIDNEY INJURY) (CMS-HCC): Status: RESOLVED | Noted: 2024-07-11 | Resolved: 2024-07-17

## 2024-07-17 LAB
ALBUMIN SERPL BCP-MCNC: 2.9 G/DL (ref 3.4–5)
ALP SERPL-CCNC: 58 U/L (ref 33–136)
ALT SERPL W P-5'-P-CCNC: 22 U/L (ref 10–52)
ANION GAP SERPL CALC-SCNC: 9 MMOL/L (ref 10–20)
AORTIC VALVE MEAN GRADIENT: 5.4 MMHG
AORTIC VALVE PEAK VELOCITY: 1.72 M/S
AST SERPL W P-5'-P-CCNC: 13 U/L (ref 9–39)
AV PEAK GRADIENT: 11.8 MMHG
AVA (PEAK VEL): 2.52 CM2
AVA (VTI): 2.98 CM2
BASOPHILS # BLD AUTO: 0.03 X10*3/UL (ref 0–0.1)
BASOPHILS NFR BLD AUTO: 0.3 %
BILIRUB SERPL-MCNC: 0.9 MG/DL (ref 0–1.2)
BUN SERPL-MCNC: 20 MG/DL (ref 6–23)
CALCIUM SERPL-MCNC: 8.4 MG/DL (ref 8.6–10.3)
CHLORIDE SERPL-SCNC: 105 MMOL/L (ref 98–107)
CHOLEST SERPL-MCNC: 97 MG/DL (ref 0–199)
CHOLESTEROL/HDL RATIO: 3.4
CO2 SERPL-SCNC: 27 MMOL/L (ref 21–32)
CREAT SERPL-MCNC: 0.93 MG/DL (ref 0.5–1.3)
EGFRCR SERPLBLD CKD-EPI 2021: 84 ML/MIN/1.73M*2
EJECTION FRACTION APICAL 4 CHAMBER: 58
EJECTION FRACTION: 53 %
EOSINOPHIL # BLD AUTO: 0.26 X10*3/UL (ref 0–0.4)
EOSINOPHIL NFR BLD AUTO: 2.2 %
ERYTHROCYTE [DISTWIDTH] IN BLOOD BY AUTOMATED COUNT: 11.5 % (ref 11.5–14.5)
EST. AVERAGE GLUCOSE BLD GHB EST-MCNC: 146 MG/DL
GLUCOSE BLD MANUAL STRIP-MCNC: 143 MG/DL (ref 74–99)
GLUCOSE BLD MANUAL STRIP-MCNC: 200 MG/DL (ref 74–99)
GLUCOSE SERPL-MCNC: 117 MG/DL (ref 74–99)
HBA1C MFR BLD: 6.7 %
HCT VFR BLD AUTO: 41.1 % (ref 41–52)
HDLC SERPL-MCNC: 28.4 MG/DL
HGB BLD-MCNC: 15 G/DL (ref 13.5–17.5)
HOLD SPECIMEN: NORMAL
IMM GRANULOCYTES # BLD AUTO: 0.11 X10*3/UL (ref 0–0.5)
IMM GRANULOCYTES NFR BLD AUTO: 0.9 % (ref 0–0.9)
LDLC SERPL CALC-MCNC: 23 MG/DL
LEFT ATRIUM VOLUME AREA LENGTH INDEX BSA: 11.7 ML/M2
LEFT VENTRICLE INTERNAL DIMENSION DIASTOLE: 3.77 CM (ref 3.5–6)
LEFT VENTRICULAR OUTFLOW TRACT DIAMETER: 2.31 CM
LYMPHOCYTES # BLD AUTO: 3.46 X10*3/UL (ref 0.8–3)
LYMPHOCYTES NFR BLD AUTO: 29.4 %
MAGNESIUM SERPL-MCNC: 1.79 MG/DL (ref 1.6–2.4)
MCH RBC QN AUTO: 33.4 PG (ref 26–34)
MCHC RBC AUTO-ENTMCNC: 36.5 G/DL (ref 32–36)
MCV RBC AUTO: 92 FL (ref 80–100)
MITRAL VALVE E/A RATIO: 0.58
MITRAL VALVE E/E' RATIO: 5.74
MONOCYTES # BLD AUTO: 0.98 X10*3/UL (ref 0.05–0.8)
MONOCYTES NFR BLD AUTO: 8.3 %
NEUTROPHILS # BLD AUTO: 6.94 X10*3/UL (ref 1.6–5.5)
NEUTROPHILS NFR BLD AUTO: 58.9 %
NON HDL CHOLESTEROL: 69 MG/DL (ref 0–149)
NRBC BLD-RTO: 0 /100 WBCS (ref 0–0)
PLATELET # BLD AUTO: 265 X10*3/UL (ref 150–450)
POTASSIUM SERPL-SCNC: 3.8 MMOL/L (ref 3.5–5.3)
PROT SERPL-MCNC: 5.3 G/DL (ref 6.4–8.2)
RBC # BLD AUTO: 4.49 X10*6/UL (ref 4.5–5.9)
RIGHT VENTRICLE FREE WALL PEAK S': 20.73 CM/S
RIGHT VENTRICLE PEAK SYSTOLIC PRESSURE: 35.6 MMHG
SODIUM SERPL-SCNC: 137 MMOL/L (ref 136–145)
TRICUSPID ANNULAR PLANE SYSTOLIC EXCURSION: 2.2 CM
TRIGL SERPL-MCNC: 229 MG/DL (ref 0–149)
VLDL: 46 MG/DL (ref 0–40)
WBC # BLD AUTO: 11.8 X10*3/UL (ref 4.4–11.3)

## 2024-07-17 PROCEDURE — 93306 TTE W/DOPPLER COMPLETE: CPT

## 2024-07-17 PROCEDURE — 83036 HEMOGLOBIN GLYCOSYLATED A1C: CPT

## 2024-07-17 PROCEDURE — 80061 LIPID PANEL: CPT

## 2024-07-17 PROCEDURE — 82947 ASSAY GLUCOSE BLOOD QUANT: CPT

## 2024-07-17 PROCEDURE — 97165 OT EVAL LOW COMPLEX 30 MIN: CPT | Mod: GO | Performed by: OCCUPATIONAL THERAPIST

## 2024-07-17 PROCEDURE — 97161 PT EVAL LOW COMPLEX 20 MIN: CPT | Mod: GP | Performed by: PHYSICAL THERAPIST

## 2024-07-17 PROCEDURE — 92523 SPEECH SOUND LANG COMPREHEN: CPT | Mod: GN | Performed by: SPEECH-LANGUAGE PATHOLOGIST

## 2024-07-17 PROCEDURE — 84075 ASSAY ALKALINE PHOSPHATASE: CPT

## 2024-07-17 PROCEDURE — 92610 EVALUATE SWALLOWING FUNCTION: CPT | Mod: GN | Performed by: SPEECH-LANGUAGE PATHOLOGIST

## 2024-07-17 PROCEDURE — 93306 TTE W/DOPPLER COMPLETE: CPT | Performed by: INTERNAL MEDICINE

## 2024-07-17 PROCEDURE — 99232 SBSQ HOSP IP/OBS MODERATE 35: CPT | Performed by: NURSE PRACTITIONER

## 2024-07-17 PROCEDURE — 36415 COLL VENOUS BLD VENIPUNCTURE: CPT

## 2024-07-17 PROCEDURE — RXMED WILLOW AMBULATORY MEDICATION CHARGE

## 2024-07-17 PROCEDURE — 85025 COMPLETE CBC W/AUTO DIFF WBC: CPT

## 2024-07-17 PROCEDURE — 2500000002 HC RX 250 W HCPCS SELF ADMINISTERED DRUGS (ALT 637 FOR MEDICARE OP, ALT 636 FOR OP/ED)

## 2024-07-17 PROCEDURE — 83735 ASSAY OF MAGNESIUM: CPT

## 2024-07-17 PROCEDURE — 99239 HOSP IP/OBS DSCHRG MGMT >30: CPT | Performed by: FAMILY MEDICINE

## 2024-07-17 PROCEDURE — G0378 HOSPITAL OBSERVATION PER HR: HCPCS

## 2024-07-17 PROCEDURE — 2500000001 HC RX 250 WO HCPCS SELF ADMINISTERED DRUGS (ALT 637 FOR MEDICARE OP)

## 2024-07-17 RX ORDER — CLOPIDOGREL BISULFATE 75 MG/1
75 TABLET ORAL DAILY
Qty: 30 TABLET | Refills: 0 | Status: SHIPPED | OUTPATIENT
Start: 2024-07-17 | End: 2024-08-16

## 2024-07-17 ASSESSMENT — COGNITIVE AND FUNCTIONAL STATUS - GENERAL
CLIMB 3 TO 5 STEPS WITH RAILING: A LOT
STANDING UP FROM CHAIR USING ARMS: A LITTLE
MOBILITY SCORE: 19
MOBILITY SCORE: 24
DAILY ACTIVITIY SCORE: 24
MOVING TO AND FROM BED TO CHAIR: A LITTLE
WALKING IN HOSPITAL ROOM: A LITTLE
DAILY ACTIVITIY SCORE: 24

## 2024-07-17 ASSESSMENT — PAIN - FUNCTIONAL ASSESSMENT
PAIN_FUNCTIONAL_ASSESSMENT: 0-10

## 2024-07-17 ASSESSMENT — ACTIVITIES OF DAILY LIVING (ADL): ADL_ASSISTANCE: INDEPENDENT

## 2024-07-17 ASSESSMENT — PAIN SCALES - GENERAL
PAINLEVEL_OUTOF10: 0 - NO PAIN

## 2024-07-17 ASSESSMENT — VISUAL ACUITY: VA_NORMAL: 1

## 2024-07-17 NOTE — DISCHARGE SUMMARY
"Discharge Diagnosis  CVA (cerebral vascular accident) (Multi)    Issues Requiring Follow-Up  Acute cerebellar stroke, elevated hemoglobin A1c with elevated blood sugars,    This discharge took greater than 35 minutes.    Test Results Pending At Discharge  Pending Labs       Order Current Status    Blood Culture Preliminary result    Blood Culture Preliminary result            Hospital Course   79 y.o. male with PMHx s/f Afib on Eliquis, aortic regurgitation, HTN, BPV, CAD, diverticulosis, emphysema, HLD, CHRISTINE  presenting with dizziness. The patient states he had episodes of lightheadedness and worsening shortness of breath while playing horseshoes on Wednesday and was admitted for pneumonia from 07/11-07/13 and was discharged on 3 days of steroids, ceftin, and doxycycline. The patient got discharged but his dizziness remains unsolved. He notes that his dizziness is constant and he had trouble ambulating throughout the house. His dizziness comes on with standing and walking, he notes that he has hard time finding balance and had to \"hold onto railing the entire time for bathrooms\". He also has associated shortness of breath, nausea, and blurry vision with dizziness. He contacted his PCP who instructed him to come into ED for further evaluation. Per chart review, he has had chronic episodes of lightheadedness and hypotension for past years, follows a cardiologist and has had implantable loop recorder. His loop recorder has been unable to find anything besides occasional sinus pauses during the night associated with apneic events when he is not wearing his CPAP. He denies f/c, chest pain, abd pain, back pain, syncope, changes in urinary and bowel symptoms.     ED Course (Summary):   Vitals on presentation: 98.1 F, 98 bpm, 16 RR, 127/81, 95% on RA  Labs: Chemistries-glucose 163, sodium 135, potassium 3.2, chloride 99, BUN/creatinine 20/1.31, EGFR 55, BNP 18  Troponin 13>>11  D-dimer 354  CBC-leukocyte 7.2, hemoglobin " 17.3, segs neutrophils 11.01  VBG-pH 7.44, pCO2 42, pO2 38, HCO3 28.5, lactate 3.0 >> 1.6  Urine analysis negative  Imaging: CXR - Resolving bibasilar infiltrates/atelectasis   CT head  - Wedge-shaped region of hypoattenuation of the inferior medial left cerebellum suspicious for subacute ischemia. Brain MRI with without contrast advised for further assessment. Relative hyperdensity of the basilar artery, nonspecific and possibly representing atherosclerotic changes, however consider CT angiogram for further assessment and exclusion of underlying thrombus.  CTA for PE - negative PE. Prominent emphysematous disease of the lungs, most marked in the upper lobes. Prominent interstitial markings at the lung bases which may be due to passive congestion.  Focal mild atelectasis in the left lower lobe.  Interventions: LR bolus 500 mL, meclizine 25 mg, potassium chloride 40 mEq, admission for further management      7/17:                 Today the patient is seen awake alert and interactive appropriately.  States he desires discharge home.  He apparently was able to ambulate with PT in the halls without any difficulty.  States he noticed a very slight amount of dizziness when he sat back down in bed.  However states he ambulated without any assistance from PT.  MRI revealed a tiny infarct in the cortex of the left cerebellum with bilateral remote cerebellar infarcts.  MRA unremarkable.  Echocardiogram revealed low normal LV systolic function with EF 50 to 55% with RV systolic function normal and mildly increased RVSP and mild AR similar to previous.  In view of this neurology feels he can be discharged home.  He will continue Eliquis.  As he was on ASA prior to this event decision made to change this to Plavix.  He will [facility]    Review of Systems   Constitutional:  Negative for chills, fatigue and fever.   HENT:  Negative for congestion.    Respiratory: Talkative for shortness  of breath. Negative for cough, chest tightness and wheezing.    Cardiovascular:  Negative for chest pain, palpitations and leg swelling.   Gastrointestinal: Negative for nausea. Negative for abdominal pain, constipation, diarrhea and vomiting.   Genitourinary:  Negative for flank pain and hematuria.   Musculoskeletal:  Negative for back pain and joint swelling.   Skin:  Negative for rash and wound.   Neurological:  Positive for much improved dizziness. Negative for tremors, syncope, weakness and headaches.  Denies focal numbness weakness or paresthesias headache or visual disturbances      CVA  -CT head: Wedge-shaped region of hypoattenuation of the inferior medial left cerebellum suspicious for subacute ischemia.  -MRI Brain w/o contrast + MRA Head and Neck   -Echocardiogram with bubble study  -Telemetry   -Labs: lipid panel, hemoglobin A1c  -ASA 81 mg & Brilinta 180 mg then 90 mg + Statin   -avoid hypotension SBP <100 that could worsen cerebral perfusion  -early permissive hypertension SBP < 220 mmHg; hydralazine and labetalol PRN for SBP >220  -hold home norvasc, losartan/hydrochlorothiazide  -PT/OT/SLP   -Neurology consultation, appreciate further recommendations  7/17: MRI did reveal tiny infarct in the cortex left cerebellum with bilateral remote cerebellar infarcts as well.  MRA unremarkable.  Echocardiogram appears essentially unchanged from prior.  Although initially started on ASA and Brilinta in addition to his Eliquis the Brilinta was an error and as he was on ASA prior to this event discussion with neurology recommended change ASA to Plavix and continue Eliquis.  He will follow-up with [facility]y [facility] or [facility].     Hyperglycemia   -no history of diabetes  -stress hyperglycemia secondary to stroke  -HbA1c pending  -monitor and adjust with SSI in AM as necessary  7/17: Discussed with patient his hemoglobin A1c was 6.7 as it has had mild elevations of blood sugars here.  He has no knowledge of  ever being considered diabetic previously though strong family history.  Discussed with him improving dietary choices and to follow-up this subject with his PCP.     Hypokalemia  -K 3.2 --> given Klor-Con 40 mEq in ED --> recheck in AM  -monitor and replenish as necessary     SHANTA   -Baseline serum creatinine: ~0.95-1.10  -Creatinine at admission: 1.31  -Suspect secondary to decrease renal perfusion  -IVF: LR maintenance fluids for 14 hrs  -hold home norvasc, losartan/hydrochlorothiazide  -Recheck renal function panel in AM   -Nephrology consultation if not improving, appreciate further recommendations  7/17: Creatinine improved to 0.93 today.     leukocytosis   -CXR showed resolving bibasilar infiltrates/atelectasis  -possible secondary to steroids use  -Lactate VBG 3.0 >> 1.6  -pt vitals are stable, and he notes of SOB only with dizziness; will continue his home oral ceftin and doxy for more 2 days as prescribed on discharge on 7/13, can consider IV abx if acute changes arise  -blood cultures pending  -follow WBC and fever curve  7/17: WBC trending down.  He will complete his home course of Ceftin and doxycycline.  Has remained afebrile.     HLD/CAD  -statin, ASA     Hx afib  -hold Eliquis, continue on day 1 of admission            Pertinent Physical Exam At Time of Discharge  Physical Exam  Constitutional: Pleasant and cooperative. Laying in bed in no acute distress. Conversant.   Skin: Warm and dry; no obvious lesions, rashes, pallor, or jaundice. Good turgor.   Eyes: EOMI. Anicteric sclera.   ENT: Mucous membranes moist; no obvious injury or deformity appreciated.   Head and Neck: Normocephalic, atraumatic. ROM preserved. Trachea midline. No appreciable JVD.   Respiratory: Nonlabored on RA. Decreased lung sounds in the upper lobes and slight crackles at bilateral bases. Chest rise is equal.  Cardiovascular: RRR. No gross murmur, gallop, or rub. Extremities are warm and well-perfused with good capillary refill (<  3 seconds). No chest wall tenderness.   GI: Abdomen soft, nontender, nondistended. No obvious organomegaly appreciated. Bowel sounds are present and normoactive.  : No CVA tenderness.   MSK: No gross abnormalities appreciated. No limitations to AROM/PROM appreciated.   Extremities: No cyanosis, edema, or clubbing evident. Neurovascularly intact.   Neuro: A&Ox3. CN 2-12 grossly intact. . Equal sensory and strength in bilateral upper and lower extremities. No motor drift. No dysarthria. Able to respond to questions appropriately and clearly. Gait unsteadiness.  No abnormal involuntary movements appreciated  Psych: Appropriate mood and behavior.     Home Medications     Medication List      START taking these medications     clopidogrel 75 mg tablet; Commonly known as: Plavix; Take 1 tablet (75   mg) by mouth once daily.     CONTINUE taking these medications     amLODIPine 5 mg tablet; Commonly known as: Norvasc   apixaban 5 mg tablet; Commonly known as: Eliquis   atorvastatin 40 mg tablet; Commonly known as: Lipitor; Take 1 tablet (40   mg) by mouth once daily at bedtime.   cefuroxime 500 mg tablet; Commonly known as: Ceftin; Take 1 tablet (500   mg) by mouth 2 times a day for 5 days.   cholecalciferol 25 MCG (1000 UT) capsule; Commonly known as: Vitamin D-3   doxycycline 100 mg capsule; Commonly known as: Vibramycin; Take 1   capsule (100 mg) by mouth 2 times a day for 5 days. Take with at least 8   ounces (large glass) of water, do not lie down for 30 minutes after   losartan-hydrochlorothiazide 50-12.5 mg tablet; Commonly known as:   Hyzaar; Take 1 tablet by mouth once daily.   Mucus Relief  mg 12 hr tablet; Generic drug: guaiFENesin; Take 1   tablet (600 mg) by mouth every 12 hours if needed for congestion for up to   5 days. Do not crush, chew, or split.   multivitamin tablet     STOP taking these medications     aspirin 81 mg chewable tablet   predniSONE 20 mg tablet; Commonly known as: Deltasone    ticagrelor 90 mg tablet; Commonly known as: Brilinta       Outpatient Follow-Up  PCP, neurology [facility] or [facility]    Oswaldo Landrum MD

## 2024-07-17 NOTE — PROGRESS NOTES
Speech-Language Pathology Clinical Swallow Evaluation    Patient Name: Darius Roe  MRN: [id number]  : 1945  Today's Date: 24  Start time: Start Time: 1020  Stop time: Stop Time: 1042  Time calculation (min) : Time Calculation (min): 22 min    ASSESSMENT  Impressions:  Pt was alert and cooperative during the SLP visit.     Pt exhibited no oropharyngeal phase dysphagia based on clinical swallow evaluation. No immediate or delayed s/sx aspiration/penetration were observed with any consistencies.     Pt safe to continue baseline diet of Regular diet and Thin liquids.     No SLP services recommended at this time. See different SLP note for speech and language evaluation.     Additional consult/referral: N/A     Prognosis: Good      PLAN  Recommendations:  MBSS recommended: No; no pharyngeal dysphagia suspected.  Solid consistency: Regular (IDDSI level 7)  Liquid consistency: Thin (IDDSI 0)  Medication administration: Whole, in thin liquid  Compensatory swallow strategies: - Upright positioning for all PO intake  - Remain upright for >30 min after meals  - Slow rate of intake  - Small bites  - Small sips  - Alternate bites and sips    Recommended frequency/duration:  Skilled SLP services recommended: No  Frequency: N/A  Duration: N/A  Discharge recommendation: No SLP services needed at this time.  Strengths: Cognition, Motivation, and Family/caregiver support  Barriers to participation in tx: N/A      Goals : N/A      REASON FOR ADMISSION:  CHIEF COMPLAINT: Pt presenting with dizziness and difficulty ambulating throughout the house.     PMHx relevant to rehab: Afib on Eliquis, aortic regurgitation, HTN, BPV, CAD, diverticulosis, emphysema, HLD, CHRISTINE     Relevant imaging results:   MR BRAIN- IMPRESSION:  Tiny acute infarct involving the posterior cortex of the left  cerebellum. No mass effect or hemorrhagic transformation.      Moderate chronic small vessel ischemic disease with additional  bilateral remote  cerebellar infarcts and additional remote ischemic  insults as described in detail above.      No evidence of source vessel arterial occlusion, flow significant  stenosis, or aneurysm within the head and neck. Anatomic variants as  described in detail above, similar to remote CTA.      SUBJECTIVE  SLP Received On: 07/17/24  Patient Class: Inpatient  Living Environment: Home  Ordering Physician: Lonnie Mina PA-C  Reason for Referral: suspect oropharygneal dysphagia  Prior to Session Communication: Bedside nurse    RN cleared pt to participate in session and reported that pt passed his nursing swallow screen and took his pills without any difficulty. Pt's speech and language skills appears to be at baseline.     Pt/family reported no issues with swallowing noted and pt had 100% of his breakfast (scrambled eggs with gray, muffin and coffee) with no overt coughing or choking. Pt reported no issues with speech and language however back in ED pt had difficulty getting the words out which is back to baseline now.     Nutritional status: Appears well-nourished/no concerns          [employer] Diet: Regular diet and Thin Liquids  Current Diet : Regular diet and Thin liquid    Pain Assessment  Pain Assessment: 0-10  0-10 (Numeric) Pain Score: 0 - No pain      Behavior/Cognition: Alert, Cooperative, Pleasant mood  Orientation: Ox4  Ability to follow functional commands: WFL  Respiratory Status: Room air           Patient positioning: Upright in bed      OBJECTIVE  Clinical swallow evaluation completed and consisted of interview, oral motor assessment, and PO trials (3 oz of consecutive straw sips of thin liquid, 5 bites of applesauce, 5 trials of 1 level tsp of pears with liquid, 1 whole harrison crackers at 3 bites ).    ORAL MOTOR: Dentition: Pt wears upper and lower dentures  Oral Hygiene: Oral mucosa were pink, moist, and free of obvious lesions. Lingual strength and ROM were WFL. Labial strength/ROM were WFL. Labial seal was  adequate. Palatal elevation: adequate.     ORAL PHASE: Mastication of regular solids and soft solids were adequate.  A/P transit was timely for all consistencies.  No oral residuals were seen.    PHARYNGEAL PHASE: Laryngeal elevation was visualized or palpated with all trials, however adequacy of hyolaryngeal elevation/excursion cannot be determined at bedside.     No immediate or delayed s/sx aspiration/penetration were observed with any consistencies.    Pt completed 3 oz water challenge without break and no overt signs and symptoms of coughing or choking noted.     SLP assessed pt taking multiple pills (whole pills, 5 pills) at once with thin liquid and no overt coughing or choking noted.     Voice quality remained clear across all trials and consistencies.     Pt to continue baseline diet of Regular diet and Thin liquid. Pt  and family are in agreement with it.       ESOPHAGEAL PHASE: N/A      Treatment/Education:  Results and recommendations were relayed to: Patient, Family, and Bedside nurse  Education provided: Yes   Learner: Patient and Significant other   Barriers to learning: None   Method of teaching: Verbal   Topic: role of ST, results of assessment, risk for aspiration, and recommended safe swallow strategies   Outcome of teaching: Pt verbalized understanding  Treatment provided: No    Next Treatment Priority: No SLP services needed at this time

## 2024-07-17 NOTE — PROGRESS NOTES
[facility] Progress Note    Darius Roe is a 79 y.o. male on day 1 of admission presenting with CVA (cerebral vascular accident) (Multi).  Subjective   Pt seen and examined again this afternoon. Resting in bed. No family present. Pt states he was able to ambulate through hallway today without assistance. Denied any dizziness. Overall feels like he is doing much better. Wanting to go home.     TTE done this morning, results pending.        Objective     Vitals:    07/16/24 2331 07/17/24 0359 07/17/24 0806 07/17/24 1147   BP: 126/71 117/71 112/69 131/78   BP Location: Right arm Right arm Left arm Right arm   Patient Position: Lying Lying Lying Lying   Pulse: 63 55 71 68   Resp: 18 18 18 18   Temp: 36.7 °C (98 °F) 36.7 °C (98.1 °F) 36.6 °C (97.8 °F) 37 °C (98.6 °F)   TempSrc: Temporal Temporal Temporal Temporal   SpO2: 95% 93% 90% 91%   Weight:  73.9 kg (162 lb 14.7 oz)     Height:             Physical Exam  Vitals reviewed.   Eyes:      General: Lids are normal.      Extraocular Movements: Extraocular movements intact.      Pupils: Pupils are equal, round, and reactive to light.   Neurological:      Motor: Motor strength is normal.  Psychiatric:         Speech: Speech normal.       Neurological Exam  Mental Status  Awake, alert and oriented to person, place and time. Speech is normal. Language is fluent with no aphasia.    Cranial Nerves  CN II: Visual acuity is normal. Visual fields full to confrontation.  CN III, IV, VI: Extraocular movements intact bilaterally. Normal lids and orbits bilaterally. Pupils equal round and reactive to light bilaterally.  CN V: Facial sensation is normal.  CN VII: Full and symmetric facial movement.  CN VIII: Hearing is normal.  CN IX, X: Palate elevates symmetrically. Normal gag reflex.  CN XI: Shoulder shrug strength is normal.  CN XII: Tongue midline without atrophy or fasciculations.    Motor  Normal muscle bulk throughout. No fasciculations present. Normal muscle tone.  No abnormal involuntary movements. Strength is 5/5 throughout all four extremities.    Sensory  Light touch is normal in upper and lower extremities.     Coordination  Right: Finger-to-nose normal.Left: Finger-to-nose normal.      Scheduled medications  [Held by provider] amLODIPine, 5 mg, oral, Daily  [Held by provider] apixaban, 5 mg, oral, BID  aspirin, 81 mg, oral, Daily  atorvastatin, 40 mg, oral, Nightly  cefuroxime, 500 mg, oral, BID  doxycycline, 100 mg, oral, BID  [Held by provider] losartan 100 mg, hydroCHLOROthiazide 12.5 mg for Hyzaar 100 mg-12.5 mg, , oral, Daily  pantoprazole, 40 mg, oral, Daily before breakfast   Or  pantoprazole, 40 mg, intravenous, Daily before breakfast  perflutren protein A microsphere, 0.5 mL, intravenous, Once in imaging  polyethylene glycol, 17 g, oral, Daily  ticagrelor, 90 mg, oral, BID      Continuous medications     PRN medications  PRN medications: acetaminophen, bisacodyl, bisacodyl, guaiFENesin, hydrALAZINE, labetaloL, melatonin, ondansetron, oxygen     Lab Results   Component Value Date    WBC 11.8 (H) 07/17/2024    RBC 4.49 (L) 07/17/2024    HGB 15.0 07/17/2024    HCT 41.1 07/17/2024     07/17/2024     07/17/2024    K 3.8 07/17/2024     07/17/2024    BUN 20 07/17/2024    CREATININE 0.93 07/17/2024    EGFR 84 07/17/2024    CALCIUM 8.4 (L) 07/17/2024    ALKPHOS 58 07/17/2024    AST 13 07/17/2024    ALT 22 07/17/2024    MG 1.79 07/17/2024    HGBA1C 6.7 (H) 07/17/2024    LDLDIRECT 102 04/14/2021    LDLCALC 23 07/17/2024    CHOL 97 07/17/2024    HDL 28.4 07/17/2024    TRIG 229 (H) 07/17/2024    TSH 2.01 12/31/2019       Below CT and MRI images and reports have been personally reviewed in PACS, agree with interpretations.    MR brain wo IV contrast 07/16/2024  MR angio neck wo IV contrast 07/16/2024  MR angio head wo IV contrast 07/16/2024    Narrative  Interpreted By:  Sandeep Chang,  STUDY:  MR BRAIN WO IV CONTRAST; MR ANGIO HEAD WO IV CONTRAST; MR  ANGIO NECK  WO IV CONTRAST;  7/16/2024 3:44 pm    INDICATION:  Signs/Symptoms:dizziness, concern for stroke.    COMPARISON:  CT head dated 07/16/2020. MRI brain dated 11/10/2021. CTA head and  neck dated 11/10/2021.    ACCESSION NUMBER(S):  [id number]; [id number]; [id number]    ORDERING CLINICIAN:  TINO DONAHUE    TECHNIQUE:  1) Standard multiplanar multisequence MR imaging was performed  through the brain without intravenous contrast.  2) Noncontrast 3D time-of-flight MRA imaging was performed through  the brain.  3) Noncontrast 2D time-of-flight MRA imaging was performed through  the neck.    FINDINGS:  BRAIN:    Parenchyma: There is a tiny acute infarct involving the posterior  cortex of the left cerebellar hemisphere (series 204, image 80) with  corresponding T2/FLAIR hyperintensity. There is no mass effect or  hemorrhagic transformation. Tiny focus of T2 shine through suggested  within the right cerebellar hemisphere (series 204, image 88). There  is also a small focus of T2 shine through suggested within the  subcortical superior left frontal lobe (series 204, image 240). No  evidence of recent hemorrhage. There is no mass effect or midline  shift. There is moderate chronic small vessel ischemic disease.  Additional remote bilateral cerebellar infarct components. Small  remote left basal ganglia lacunar infarct near the genu of the  internal capsule.    CSF Spaces: The ventricles, sulci and basal cisterns are within  normal limits for age with moderate generalized brain atrophy.  Basilar cisterns are patent.    Extra-axial spaces: No extra-axial fluid collection.    Paranasal Sinuses: Visualized paranasal sinuses are clear.    Mastoids: Clear.    Orbits: Bilateral native lens extractions.    Calvarium: No suspicious osseous marrow signal.      VASCULAR FINDINGS:    Common carotid arteries: Patent flow signal within the visualized  portions.    External carotid arteries: Patent flow signal  bilaterally.    Internal carotid arteries: Patent flow signal bilaterally. Mild  atherosclerotic narrowing of the internal carotid artery origins with  no NASCET significant stenosis. Mild tortuosity of the cervical  internal carotid artery segments bilaterally. The right internal  carotid artery is slightly larger in caliber relative to the left.  The intracranial segments appear patent without flow significant  stenosis or definite aneurysm.    Anterior cerebral arteries: Hypoplastic versus absent left A1  segment. Otherwise normal flow signal bilaterally.    Middle cerebral arteries: Normal flow signal bilaterally.    Vertebral arteries: The visualized portions demonstrate patent flow  signal bilaterally with no flow significant focal stenosis. The right  vertebral artery is mildly dominant.    Basilar artery: Normal flow signal.    Posterior communicating arteries: Not well visualized, diminutive  versus absent.    Posterior cerebral arteries: Normal flow signal bilaterally.    Impression  Tiny acute infarct involving the posterior cortex of the left  cerebellum. No mass effect or hemorrhagic transformation.    Moderate chronic small vessel ischemic disease with additional  bilateral remote cerebellar infarcts and additional remote ischemic  insults as described in detail above.    No evidence of source vessel arterial occlusion, flow significant  stenosis, or aneurysm within the head and neck. Anatomic variants as  described in detail above, similar to remote CTA.    MACRO:  Critical Finding:  Acute infarct. Notification was initiated on  7/16/2024 at 3:58 pm by  Sandeep Chang.  (**-OCF-**)    Signed by: Sandeep Chang 7/16/2024 3:58 PM  Dictation workstation:   [id number]      CT head wo IV contrast 07/16/2024    Narrative  Interpreted By:  Asim Gill,  STUDY:  CT HEAD WO IV CONTRAST;  7/16/2024 11:16 am    INDICATION:  Signs/Symptoms:dizziness since Friday.    COMPARISON:  None.    ACCESSION  NUMBER(S):  [id number]    ORDERING CLINICIAN:  TINO DONAHUE    TECHNIQUE:  Noncontrast axial CT scan of head was performed. Angled reformats in  brain and bone windows were generated. The images were reviewed in  bone, brain, blood and soft tissue windows.    FINDINGS:  CSF Spaces: The ventricles, sulci and basal cisterns are within  normal limits. There is no extraaxial fluid collection.    Parenchyma:  Small region of wedge-shaped hypoattenuation along the  inferior aspect of the left cerebellum medially. Relatively increased  density of the basilar artery. Periventricular and subcortical white  matter hypoattenuation is nonspecific, however likely reflects  chronic microvascular ischemic versus chronic hypertensive changes. .  There is no mass effect or midline shift.  There is no intracranial  hemorrhage.    Calvarium: The calvarium is unremarkable.    Paranasal sinuses and mastoids: Visualized paranasal sinuses and  mastoids are clear.    Impression  Wedge-shaped region of hypoattenuation of the inferior medial left  cerebellum suspicious for subacute ischemia. Brain MRI with without  contrast advised for further assessment.    Relative hyperdensity of the basilar artery, nonspecific and possibly  representing atherosclerotic changes, however consider CT angiogram  for further assessment and exclusion of underlying thrombus.        MACRO:  Asim Gill discussed the significance and urgency of this critical  finding by telephone with  TINO DONAHUE on 7/16/2024 at 11:50 am.  (**-RCF-**) Findings:  See findings.          Signed by: Asim Gill 7/16/2024 11:51 AM  Dictation workstation:   [id number]      NIH Stroke Scale  1A. Level of Consciousness: Alert, Keenly Responsive  1B. Ask Month and Age: Both Questions Right  1C. Blink Eyes & Squeeze Hands: Performs Both Tasks  2. Best Gaze: Normal  3. Visual: No Visual Loss  4. Facial Palsy: Normal Symmetrical Movements  5A. Motor - Left Arm: No Drift  5B. Motor  - Right Arm: No Drift  6A. Motor - Left Leg: No Drift  6B. Motor - Right Leg: No Drift  7. Limb Ataxia: Absent  8. Sensory Loss: Normal  9. Best Language: No Aphasia  10. Dysarthria: Normal  11. Extinction and Inattention: No Abnormality  NIH Stroke Scale: 0          [place] Coma Scale  Best Eye Response: Spontaneous  Best Verbal Response: Oriented  Best Motor Response: Follows commands  [place] Coma Scale Score: 15        Assessment/Plan   This patient currently has cardiac telemetry ordered; if you would like to modify or discontinue the telemetry order, click here to go to the orders activity to modify/discontinue the order.  Principal Problem:    CVA (cerebral vascular accident) (Multi)  Active Problems:    CAD (coronary artery disease)    A-fib (Multi)    Hyperlipidemia    Atherosclerosis of native coronary artery of native heart without angina pectoris    Hyperglycemia    SHANTA (acute kidney injury) (CMS-HCC)    Leukocytosis    Dizziness    Metabolic alkalosis    Hypokalemia      IMPRESSION:  Darius Roe is a 79 y.o. male with a history of afib, HTN, CAD, HLD and CHRISTINE presenting for cerebellar stroke and ongoing dizziness. On eliquis and aspririn daily outpatient.   HCT with wedge shaped region of hypoattenuation of inferior medial L cerebellum suspicious for acute ischemia  MRI brain wo contrast - tiny acute infarct posterior L cerebellum, no mass effect or hemorrhage, B remote cerebellar infarcts and remote L basal ganglia infarct  MRA head/neck - no significant stenosis or LVO  A1C 6.7%  Lipid panel with LDL 23, CHOL 97,      Acute L cerebellar stroke  Dizziness, only with standing  Resolved  Orthos wnl    RECOMMENDATIONS:  Continue supportive care.   Continue eliquis  Will change aspirin to Plavix 75 mg daily in light of possible failure of aspirin, risks are relatively well controlled.  Continue HI statin.   Continue to monitor and manage stroke risks - HTN, DLD  TTE report pending  PT/OT  evaluation  Continue to use CPAP at night, discussed with patient, stopped using recently due to congestion and pneumonia.     [facility].   Discussed with patient, all questions answered.   Ok to discharge when TTE resulted if ok.        I personally spent 35 minutes today, exclusive of procedures, providing care for this patient, including preparation, face to face time, documentation and other services such as review of medical records, diagnostic result, patient education, counseling, coordination of care as specified in the encounter.    Allyson Bucio, APRN-CNP

## 2024-07-17 NOTE — PROGRESS NOTES
Physical Therapy    Physical Therapy Evaluation    Patient Name: Darius Roe  MRN: [id number]  Today's Date: 7/17/2024   Time Calculation  Start Time: 1127  Stop Time: 1139  Time Calculation (min): 12 min  2006/2006-A    Assessment/Plan   PT Assessment  PT Assessment Results: Impaired balance, Decreased coordination, Decreased mobility  Rehab Prognosis: Good  Barriers to Discharge: none  Evaluation/Treatment Tolerance: Patient tolerated treatment well  Medical Staff Made Aware: Yes  End of Session Communication: Bedside nurse  Assessment Comment: Patient requires CGA and cues for amb/mobility due to slight forward pitch and mild decreased balance. Anticipate good improvement over course of admit.  End of Session Patient Position: Bed, 3 rail up, Alarm on (No other chair available as wife up in chair in room)  IP OR SWING BED PT PLAN  Inpatient or Swing Bed: Inpatient  PT Plan  Treatment/Interventions: Bed mobility, Transfer training, Gait training, Stair training, Balance training, Strengthening, Endurance training, Therapeutic exercise, Therapeutic activity, Home exercise program  PT Plan: Ongoing PT  PT Frequency: 3 times per week  PT Discharge Recommendations: Low intensity level of continued care (OUT Patient PT for balance)  PT - OK to Discharge: Yes (when medically cleared)      General Visit Information:  General  Reason for Referral: Dx: Dizziness, L Cerebellar CVA  Referred By: Leopoldo  Past Medical History Relevant to Rehab: Recent admit for PNA, A fib, HTN, CAD, CHRISTINE, remote vertigo  Prior to Session Communication: Bedside nurse  Patient Position Received: Bed, 3 rail up, Alarm on  General Comment: Seen in room 2006, wife present; tele    Home Living:  Home Living  Type of Home:  (Lives with wife in 1 level home with basement, few steps to enter. Patient amb without AD, independent with ADLs.)    Prior Level of Function:       Precautions:  Precautions  Precautions Comment: falls    Vital Signs:      Objective     Pain:  Pain Assessment  0-10 (Numeric) Pain Score: 0 - No pain    Cognition:  Cognition  Overall Cognitive Status: Within Functional Limits    General Assessments:  General Observation  General Observation: No reported dizzines with activity today. Patient did state slight SOB with sitting up, O2 sats >/=93% on room air.   Activity Tolerance  Endurance: Endurance does not limit participation in activity  Sensation  Light Touch: No apparent deficits  Strength  Strength Comments: WFL        Postural Control  Postural Control:  (Sitting balance fair+; standing balance fair - slight pitch forward and wide SAUNDRA noted)          Functional Assessments:     Bed Mobility  Bed Mobility:  (Supine to/from sit with SBA x1)  Transfers  Transfer:  (Sit to stand with SBA/CGA x1; cues for safety, pacing, balance)  Ambulation/Gait Training  Ambulation/Gait Training Performed:  (Amb 175 feet with CGA x1; cues for safety, pacing, balance, posture)          Extremity/Trunk Assessments:                Outcome Measures:     [facility] Basic Mobility  Turning from your back to your side while in a flat bed without using bedrails: None  Moving from lying on your back to sitting on the side of a flat bed without using bedrails: None  Moving to and from bed to chair (including a wheelchair): A little  Standing up from a chair using your arms (e.g. wheelchair or bedside chair): A little  To walk in hospital room: A little  Climbing 3-5 steps with railing: A lot  Basic Mobility - Total Score: 19                                        Goals:  Encounter Problems       Encounter Problems (Active)       PT Problem       Gait       Start:  07/17/24    Expected End:  07/31/24       Patient will amb 200+ feet with SBA x1, improved balance/posture and no difficulty with obstacles/unlevel surfaces           stairs       Start:  07/17/24    Expected End:  07/31/24       Patient will ascend/descend 3+ stairs with rail/device prn and SBA/CGA x1              Pain - Adult            Education Documentation  Precautions, taught by Pricilla Ceron, PT at 7/17/2024 12:24 PM.  Learner: Patient  Readiness: Acceptance  Method: Explanation  Response: Verbalizes Understanding    Mobility Training, taught by Pricilla Ceron, PT at 7/17/2024 12:24 PM.  Learner: Patient  Readiness: Acceptance  Method: Explanation  Response: Verbalizes Understanding    Education Comments  No comments found.

## 2024-07-17 NOTE — PROGRESS NOTES
07/17/24 1437   Discharge Planning   Living Arrangements Spouse/significant other   Support Systems Spouse/significant other   Assistance Needed none   Type of Residence Private residence   Do you have animals or pets at home? No   Home or Post Acute Services None   Expected Discharge Disposition Home   Does the patient need discharge transport arranged? No     Discharge assessment complete. Patient lives at home with wife . Patient states that he is completely independent at home. Patient confirmed [facility] as Jean Claude [facility] [place]. Patient's wife was at bedside. Patient denied any discharge needs at this time. Patient is aware of Care Transitions if any needs were to arise. [facility] contacted [facility] in [facility].

## 2024-07-17 NOTE — NURSING NOTE
Discharge instructions discussed with patient and spouse, all  questions answered to satisfaction at this time.

## 2024-07-17 NOTE — PROGRESS NOTES
Occupational Therapy    Evaluation    Patient Name: Darius Roe  MRN: [id number]  Today's Date: 7/17/2024  Time Calculation  Start Time: (S) 1106  Stop Time: 1127  Time Calculation (min): 21 min  2006/2006-A    Assessment  IP OT Assessment  OT Assessment: at baseline function, no symptoms of dizziness during eval.  Medical Staff Made Aware: Yes  End of Session Communication: Bedside nurse  End of Session Patient Position: Bed, 3 rail up, Alarm on       07/17/24 1026   Inpatient Plan   No Skilled OT No acute OT goals identified   OT - OK to Discharge Yes  ((when medically approp.))   OT Discharge Recommendations No OT needed after discharge   OT Recommended Transfer Status Stand by assist     Subjective     Current Problem:  1. Dizziness  Transthoracic Echo (TTE) Complete    Transthoracic Echo (TTE) Complete      2. Cerebrovascular accident (CVA), unspecified mechanism (Multi)  Transthoracic Echo (TTE) Complete    Transthoracic Echo (TTE) Complete      3. Atrial fibrillation, unspecified type (Multi)  Transthoracic Echo (TTE) Complete    Transthoracic Echo (TTE) Complete      4. BENAVIDES (dyspnea on exertion)  Transthoracic Echo (TTE) Complete    Transthoracic Echo (TTE) Complete          General:  General  Reason for Referral: r/o CVA, dizziness  Referred By: Leopoldo  Past Medical History Relevant to Rehab: Hx. of CAD, CHRISTINE, emphysema  Family/Caregiver Present: Yes (wife)  Caregiver Feedback: none  Patient Position Received: Bed, 3 rail up, Alarm on  General Comment: pt. agreeable to OOB, expresses frustration with being sent home from ED after 1st visit and dizziness symptoms remained    Precautions:  Medical Precautions: Fall precautions    Vital Signs:see nurses notes        Pain:  Pain Assessment  Pain Assessment: 0-10  0-10 (Numeric) Pain Score: 0 - No pain    Objective     Cognition:  Overall Cognitive Status: Within Functional Limits  Orientation Level: Oriented X4         Home Living:  Type of Home: House  Lives  With: Spouse  Home Adaptive Equipment: None  Home Layout: One level     Prior Function:  Level of [place]: Independent with ADLs and functional transfers, Independent with homemaking with ambulation  ADL Assistance: Independent  Homemaking Assistance: Independent  Ambulatory Assistance: Independent  Leisure: states was playing horseshoes prior to admit    IADL History:  Homemaking Responsibilities: Yes  Meal Prep Responsibility: Primary  Laundry Responsibility: Primary  Cleaning Responsibility: Primary  Mode of Transportation:  (drives)    ADL:  LE Dressing Assistance: Independent (to don and tie shoes while seated EOB)  Toileting Assistance with Device: Stand by (to/from bathroom)    Activity Tolerance:  Endurance: Endurance does not limit participation in activity    Bed Mobility/Transfers:   Bed Mobility  Bed Mobility: Yes (supervised in/out bed)       Ambulation/Gait Training:  Functional Mobility  Functional Mobility Performed:  (CGA amb. in room and hallway , no symptoms of any kind)    Sitting Balance:WNL, no c/o dizziness        Standing Balance:  Dynamic Standing Balance  Dynamic Standing-Balance Support:  (SBA)    Vision: Vision - Basic Assessment  Current Vision: No visual deficits      Sensation:  Light Touch: No apparent deficits    Strength:  Strength Comments: UEs WNL    Perception:  Inattention/Neglect: Appears intact    Coordination:  Movements are Fluid and Coordinated: Yes     Hand Function:  Hand Function  Gross Grasp: Functional        Outcome Measures: [facility] Daily Activity  Putting on and taking off regular lower body clothing: None  Bathing (including washing, rinsing, drying): None  Putting on and taking off regular upper body clothing: None  Toileting, which includes using toilet, bedpan or urinal: None  Taking care of personal grooming such as brushing teeth: None  Eating Meals: None  Daily Activity - Total Score: 24                    EDUCATION:     Education  Documentation  Precautions, taught by Reshma Jiménez OT at 7/17/2024 12:16 PM.  Learner: Patient  Readiness: Acceptance  Method: Demonstration  Response: Demonstrated Understanding  Comment: use of call light for amb.    Education Comments  No comments found.

## 2024-07-17 NOTE — PROGRESS NOTES
Speech-Language Pathology    SLP Adult Inpatient Speech-Language Cognition    Patient Name: Darius Roe  MRN: [id number]  Today's Date: 7/17/2024   Time Calculation  Start Time: 1020  Stop Time: 1042  Time Calculation (min): 22 min         Current Problem:   1. Dizziness  Transthoracic Echo (TTE) Complete    Transthoracic Echo (TTE) Complete      2. Cerebrovascular accident (CVA), unspecified mechanism (Multi)  Transthoracic Echo (TTE) Complete    Transthoracic Echo (TTE) Complete      3. Atrial fibrillation, unspecified type (Multi)  Transthoracic Echo (TTE) Complete    Transthoracic Echo (TTE) Complete      4. BENAVIDES (dyspnea on exertion)  Transthoracic Echo (TTE) Complete    Transthoracic Echo (TTE) Complete        Impression:   Pt was awake, alert and cooperative during the SLP visit.     Pt presents with no receptive or expressive language skills deficits as well as no cognitive skill deficits based on speech and language evaluation completed this date.     SLP educated the pt to continue to work on brain games that he has been working previously and work on additional brain games such as word games, sudoku, memory games, crosswords etc. Pt is in agreement with it.     No SLP services needed at this time as speech/language/cognitive skills appear to be at baseline.     SLP Assessment:  SLP Assessment  Prognosis: Good  Medical Staff Made Aware: Yes  Education Provided: Yes      SLP Plan:  Plan  SLP Plan: Skilled SLP  SLP Frequency:  (N/A)  Duration:  (N/A)  SLP Discharge Recommendations: No SLP services needed at this time as the speech/language skills appear to be at baseline.   Next Treatment Priority: No SLP services needed at this time  SLP - OK to Discharge: Yes (When medically stable as per physician/MILLICENT)      Subjective   Current Problem:  Pt presenting with dizziness and difficulty ambulating throughout the house.     PMHx relevant to rehab: Afib on Eliquis, aortic regurgitation, HTN, BPV, CAD,  diverticulosis, emphysema, HLD, CHRISTINE      Relevant imaging results:   MR BRAIN- IMPRESSION:  Tiny acute infarct involving the posterior cortex of the left  cerebellum. No mass effect or hemorrhagic transformation.      Moderate chronic small vessel ischemic disease with additional  bilateral remote cerebellar infarcts and additional remote ischemic  insults as described in detail above.      No evidence of source vessel arterial occlusion, flow significant  stenosis, or aneurysm within the head and neck. Anatomic variants as  described in detail above, similar to remote CTA.      Most Recent Visit:  SLP Most Recent Visit  SLP Received On: 24      General Visit Information:  General Information  Reason for Referral: suspect oropharygneal dysphagia and aphasia secondary to stroke  Prior to Session Communication: Bedside nurse      Objective     Pain:  Pain Assessment  Pain Assessment: 0-10  0-10 (Numeric) Pain Score: 0 - No pain      Cognition:  Overall Cognitive Status: Within Functional Limits - Pt was able to answer orientation questions (Self, , location, situation) 4/4 without any additional cues/prompts.    Orientation Level: Oriented X4         Motor Speech Production:  Oral Motor : WFL- The structure and functions of the articulators are WFL.  Automatic Speech: WFL, Counting to 10, Naming MANOJ, Naming BECK, Backward counting from 15  - Pt was able to complete automatic speech tasks without any additional cues/prompts. Pt scored 4/4 in this task.     Repetition: WFL- Pt was able to complete repetition of words, phrases and sentences and scored 8/8 in this task.    Intelligibility: WFL - The speech was 100% intelligible.      Auditory Comprehension:   Yes/No Questions: Within Functional Limits- Pt was asked to answer a variety of simple and complex Yes/No questions accordingly and pt scored 9/9 in this task.     Commands: Within Functional Limits- Pt was able to complete one step commands (6/6) and  two-step commands (6/6) accurately.        Reading Comprehension:  Reading Status:  Pt was asked to read words (10/10) and sentences (5/5) and pt was able to read them accurately.      Verbal:  Primary Mode of Expression: Verbal  Confrontation Naming:  Pt was asked to name the objects presented and pt scored 5/5 in this task.    Sentence Completion:  Pt was able to complete the sentences accurately and scored 5/5 in this task.    List category members: Pt was able to name over 11 items for the listed categories under 1 minute, 4/5 trials, however pt had difficulty naming words that begin with R and provided 5 words, given semantic cues pt was able to provide 3 additional words that begin with R.     Name categories: Pt was able to name the category when different set of items/objects were stated and pt scored 3/3 in this task.     Define the given word:  Pt was asked to define the given word and pt scored 5/5 in this task.     Sentence formation: Given the target word, pt was able to create simple sentences and pt scored 2/2 in this task.     Abstract speech: Pt was asked to interpret the meaning of different idioms and pt scored 5/6 in this task. For one idiom, pt said he has not heard it before so he cannot interpret it.     Inpatient Education:  Adult Outpatient Education  Individual(s) Educated: Patient, Spouse  Risk and Benefits Discussed with Patient/Caregiver/Other: yes  Patient/Caregiver Demonstrated Understanding: yes  Plan of Care Discussed and Agreed Upon: yes  Patient Response to Education: Patient/Caregiver Verbalized Understanding of Information       Goals: N/A

## 2024-07-17 NOTE — CARE PLAN
The patient's goals for the shift include      The clinical goals for the shift include      Problem: Pain - Adult  Goal: Verbalizes/displays adequate comfort level or baseline comfort level  Outcome: Progressing     Problem: Safety - Adult  Goal: Free from fall injury  Outcome: Progressing     Problem: Discharge Planning  Goal: Discharge to home or other facility with appropriate resources  Outcome: Progressing     Problem: Chronic Conditions and Co-morbidities  Goal: Patient's chronic conditions and co-morbidity symptoms are monitored and maintained or improved  Outcome: Progressing

## 2024-07-17 NOTE — PROGRESS NOTES
Social work consult placed for discharge planning. SW reviewed pt's chart and communicated with TCC. No SW needs foreseen at this time. SW signing off; available upon request.    SUSAN Henry ([id number])   Care Transitions

## 2024-07-17 NOTE — CARE PLAN
The patient's goals for the shift include      The clinical goals for the shift include no new neuro deficits    Over the shift, the patient did not make progress toward the following goals. Barriers to progression include no neuro deficits. Recommendations to address these barriers include none.

## 2024-07-18 ENCOUNTER — PATIENT OUTREACH (OUTPATIENT)
Dept: CARE COORDINATION | Facility: CLINIC | Age: 79
End: 2024-07-18
Payer: MEDICARE

## 2024-07-18 ENCOUNTER — HOSPITAL ENCOUNTER (OUTPATIENT)
Dept: CARDIOLOGY | Facility: HOSPITAL | Age: 79
Discharge: HOME | End: 2024-07-18
Payer: MEDICARE

## 2024-07-18 DIAGNOSIS — R55 SYNCOPE AND COLLAPSE: ICD-10-CM

## 2024-07-18 DIAGNOSIS — R55 SYNCOPE AND COLLAPSE: Primary | ICD-10-CM

## 2024-07-18 SDOH — ECONOMIC STABILITY: GENERAL: WOULD YOU LIKE HELP WITH ANY OF THE FOLLOWING NEEDS?: I DONT NEED HELP WITH ANY OF THESE

## 2024-07-18 NOTE — NURSING NOTE
Stroke Patient- CVA Follow-Up Call    Patient/Family knows what medications to take to prevent another event? yes    Patient/Family knows what lifestyle changes are needed to prevent another event? yes    Patient/Family can verbalize signs/symptoms of a stroke and calling 911? yes    Patient/Family recommends our hospital's stroke program? N/A-will follow [facility] with [facility] at [facility]    Any suggestions or comments for us to improve the care we provide to stroke patients and families? Has all of the information he needs. They went over stroke information and sent home with paperwork

## 2024-07-18 NOTE — SIGNIFICANT EVENT
07/18/24 1529   Social Determinants of Health- Help Requested   Would you like help with any of the following needs? I dont need help with any of these   Are any of your needs urgent? For example, uncertainty of where you will get your next meal or not having the medications you need to take tomorrow. N

## 2024-07-18 NOTE — PROGRESS NOTES
[facility]  Admitted 7/16/2024  Discharged 7/17/2024  Dx: Stroke    Outreach call to patient to support a smooth transition of care from recent admission.  Patient states, he is doing okay. Patient states, one of the antibiotics side effects is dizziness. Patient states, he was feeling fine until he took the antibiotic today. Patient is to complete the course of antibiotics for the pneumonia. Patient continues to have some shortness of breath.  Nausea resolved. Reviewed discharge medications, discharge instructions, assessed social needs, and provided education on importance of follow-up appointment with provider. Enrolled patient in [employer] for additional support and education through transition period.  Will continue to monitor through transition period.    Patient to schedule with Neurologist.    Engagement  Call Start Time: 1508 (7/18/2024  3:08 PM)    Medications  Medications reviewed with patient/caregiver?: Yes (7/18/2024  3:08 PM)  Is the patient having any side effects they believe may be caused by any medication additions or changes?: (!) Yes (Dizziness from Ceftin) (7/18/2024  3:08 PM)  Does the patient have all medications ordered at discharge?: Yes (7/18/2024  3:08 PM)  Care Management Interventions: No intervention needed (7/18/2024  3:08 PM)  Prescription Comments: Patient to continue the antibiotics for pneumonia, started Plavix. Discontinued Aspirin, Prednisone, Brilinta (7/18/2024  3:08 PM)  Is the patient taking all medications as directed (includes completed medication regime)?: Yes (7/18/2024  3:08 PM)    Appointments  Does the patient have a primary care provider?: Yes ([facility] with [facility]) (7/18/2024  3:08 PM)  Care Management Interventions: Advised to schedule with specialist (Patient states, he will call and schedule an appointment with Neurologist) (7/18/2024  3:08 PM)    Self Management  What is the home health agency?: not applicable (7/18/2024  3:08  PM)  What Durable Medical Equipment (DME) was ordered?: not applicable (7/18/2024  3:08 PM)    Patient Teaching  Does the patient have access to their discharge instructions?: Yes (7/18/2024  3:08 PM)  Care Management Interventions: Reviewed instructions with patient (7/18/2024  3:08 PM)  What is the patient's perception of their health status since discharge?: Improving (7/18/2024  3:08 PM)  Is the patient/caregiver able to teach back the hierarchy of who to call/visit for symptoms/problems? PCP, Specialist, Home Health nurse, Urgent Care, ED, 911: Yes (7/18/2024  3:08 PM)    Juli Ace RN/CM  [facility]  [phone]

## 2024-07-20 LAB
BACTERIA BLD CULT: NORMAL
BACTERIA BLD CULT: NORMAL

## 2024-07-26 ENCOUNTER — PATIENT OUTREACH (OUTPATIENT)
Dept: CARE COORDINATION | Facility: CLINIC | Age: 79
End: 2024-07-26
Payer: MEDICARE

## 2024-07-26 NOTE — PROGRESS NOTES
Outreach call to patient to check in 2 weeks after hospital discharge to support smooth transition of care. Patient states, he is doing good.  He states, the dizziness is there but not as frequent. He states, he has occasional nausea but that is associated with the dizziness, The blurry vision has resolved. Patient states, he is taking it easy and being cautious. Continue the plavix and eliquis. Denies any signs of bleeding from nose, urine, or stool. Discussed plan of care. Patient with no additional questions at this time.  Will continue to follow.      Juli Ace RN/CM  [facility]  [phone]

## 2024-07-29 ENCOUNTER — APPOINTMENT (OUTPATIENT)
Dept: CARDIOLOGY | Facility: CLINIC | Age: 79
End: 2024-07-29
Payer: MEDICARE

## 2024-08-07 ENCOUNTER — OFFICE VISIT (OUTPATIENT)
Dept: NEUROLOGY | Facility: HOSPITAL | Age: 79
End: 2024-08-07
Payer: MEDICARE

## 2024-08-07 VITALS
DIASTOLIC BLOOD PRESSURE: 85 MMHG | SYSTOLIC BLOOD PRESSURE: 150 MMHG | WEIGHT: 161.8 LBS | BODY MASS INDEX: 23.22 KG/M2 | HEART RATE: 89 BPM

## 2024-08-07 DIAGNOSIS — I63.9 CEREBELLAR STROKE (MULTI): Primary | ICD-10-CM

## 2024-08-07 PROCEDURE — 1123F ACP DISCUSS/DSCN MKR DOCD: CPT | Performed by: NURSE PRACTITIONER

## 2024-08-07 PROCEDURE — 1126F AMNT PAIN NOTED NONE PRSNT: CPT | Performed by: NURSE PRACTITIONER

## 2024-08-07 PROCEDURE — 99214 OFFICE O/P EST MOD 30 MIN: CPT | Performed by: NURSE PRACTITIONER

## 2024-08-07 PROCEDURE — 3079F DIAST BP 80-89 MM HG: CPT | Performed by: NURSE PRACTITIONER

## 2024-08-07 PROCEDURE — 1160F RVW MEDS BY RX/DR IN RCRD: CPT | Performed by: NURSE PRACTITIONER

## 2024-08-07 PROCEDURE — 3077F SYST BP >= 140 MM HG: CPT | Performed by: NURSE PRACTITIONER

## 2024-08-07 PROCEDURE — 1159F MED LIST DOCD IN RCRD: CPT | Performed by: NURSE PRACTITIONER

## 2024-08-07 PROCEDURE — 1111F DSCHRG MED/CURRENT MED MERGE: CPT | Performed by: NURSE PRACTITIONER

## 2024-08-07 ASSESSMENT — ENCOUNTER SYMPTOMS
LOSS OF SENSATION IN FEET: 1
OCCASIONAL FEELINGS OF UNSTEADINESS: 1
DEPRESSION: 0

## 2024-08-07 ASSESSMENT — PAIN SCALES - GENERAL: PAINLEVEL: 0-NO PAIN

## 2024-08-07 NOTE — PATIENT INSTRUCTIONS
"Stroke  The Basics  Written by the doctors and editors at [facility]    What is a stroke? -- Stroke is the term doctors use when a part of the brain is damaged because of a problem with blood flow. Strokes can happen when:  ?An artery going to the brain gets clogged or closes off, and part of the brain goes without blood for too long.  ?An artery breaks open and starts bleeding into or around the brain.    How do strokes affect people? -- The effects of a stroke depend on a lot of things, including:  ?What kind of stroke it is  ?Which part and how much of the brain is affected  ?How quickly the stroke is treated    People who have a stroke can lose important brain functions. For example, some people become partly paralyzed or unable to speak. Stroke is one of the leading causes of death and disability in the world.    What are the symptoms of a stroke? -- The symptoms of a stroke usually start suddenly.    One way to help remember stroke symptoms is to think of the words \"BE FAST\" (figure 1). Each letter stands for 1 of the things that you should watch for and what to do:  ?Balance - Is the person having trouble standing or walking?  ?Eyes - Is the person having trouble with their vision?  ?Face - Does the person's face look uneven or droop on 1 side?  ?Arm - Does the person have weakness or numbness in 1 or both arms? Does 1 arm drift down if they try to hold both arms out?  ?Speech - Is the person having trouble speaking? Does their speech sound strange?  ?Time - If you notice any of these stroke signs, call for an ambulance (in the [place] and [place], call 9-1-1). You need to act FAST. The sooner treatment begins, the better the chances of recovery.    A stroke caused by bleeding in the brain can also cause a sudden, severe headache.    How are strokes treated? -- The right treatment depends on what kind of stroke you are having. You need to get to the hospital very quickly to figure this out.    At the hospital, the " doctors will do tests to confirm that you are having a stroke and figure out what type of stroke it is. This involves imaging tests such as a CT scan or MRI, which create pictures of the brain. Other imaging tests are used to look at the blood vessels or heart.    People whose strokes are caused by clogged arteries can:  ?Get treatments that help reopen the arteries. These treatments can help you recover from the stroke.  ?Get medicines that prevent new blood clots. These medicines also help prevent future strokes.    People whose strokes are caused by bleeding can:  ?Have treatments that might reduce the damage caused by bleeding in or around the brain  ?Stop taking medicines that increase bleeding, or take a lower dose  ?Have surgery or a procedure to treat the blood vessel to prevent more bleeding (this is not always possible)    Can strokes be prevented? -- Many strokes can be prevented, though not all. You can greatly lower your chance of having a stroke if you:  ?Take your medicines exactly as directed. Medicines that are especially important to help prevent a stroke include:  Blood pressure medicines  Medicines called statins, which lower cholesterol  Medicines to prevent blood clots, like aspirin or blood thinners  Medicines that help keep your blood sugar as close to normal as possible (if you have diabetes)  ?Make lifestyle changes:  Stop smoking, if you smoke.  Get regular exercise (if your doctor says that it's safe) for at least 30 minutes a day on most days of the week.  Try to lose weight, if you are overweight.  Eat a diet rich in fruits, vegetables, and low-fat dairy products and low in meats, sweets, and refined grains (such as white bread or white rice).  Eat less salt (sodium).  If you drink alcohol, limit the amount:  -For females, no more than 1 drink a day  -For males, no more than 2 drinks a day    Another way to prevent strokes is to have surgery or a procedure to reopen clogged arteries in  "the neck. This type of treatment is appropriate for only a small group of people.    What happens after a stroke? -- Some people recover from a stroke without long-term or serious problems. But many people lose important brain functions. For example, they might be unable to speak, feed themselves, or move 1 side of their body. Specialists can help with these things. For example, a person might work with an occupational therapist, physical therapist, speech therapist, or other specialist.    After a stroke, people are also more likely to get other medical problems. These can include blood clots in the legs, heart problems, bed sores, or lung infections. Your doctor or nurse will try to prevent these problems from happening and will treat them if they do happen.    What is a \"TIA\"? -- TIA stands for \"transient ischemic attack.\" It is like a stroke, but does not cause permanent damage to the brain. TIAs happen when an artery in the brain gets clogged or closes off, then reopens on its own. This can happen if a blood clot forms and then moves away or dissolves.    Even though TIAs do not cause lasting symptoms, they are serious. If you have a TIA, you are at high risk of having a stroke. It's important to see a doctor and take steps to prevent that from happening. Do not ignore the symptoms of a stroke even if they go away!    All topics are updated as new evidence becomes available and our peer review process is complete.    This topic retrieved from [employer] on: Jun 19, 2023.  Topic 32915 Version 15.0  Release: 31.2.26 - C31.169  © 2023 UpToDate, Inc. and/or its affiliates. All rights reserved.    figure 1: BE FAST to help remember stroke symptoms        Consumer Information Use and Disclaimer  This generalized information is a limited summary of diagnosis, treatment, and/or medication information. It is not meant to be comprehensive and should be used as a tool to help the user understand and/or assess potential " diagnostic and treatment options. It does NOT include all information about conditions, treatments, medications, side effects, or risks that may apply to a specific patient. It is not intended to be medical advice or a substitute for the medical advice, diagnosis, or treatment of a health care provider based on the health care provider's examination and assessment of a patient's specific and unique circumstances. Patients must speak with a health care provider for complete information about their health, medical questions, and treatment options, including any risks or benefits regarding use of medications. This information does not endorse any treatments or medications as safe, effective, or approved for treating a specific patient. UpToDate, Inc. and its affiliates disclaim any warranty or liability relating to this information or the use thereof.The use of this information is governed by the Terms of Use, available at https://www.Slurp.co.ukuwer.com/en/know/clinical-effectiveness-terms ©2023 UpToDate, Inc. and its affiliates and/or licensors. All rights reserved.  © 2023 Up[employer]Date, Inc. and/or its affiliates. All rights reserved.

## 2024-08-07 NOTE — PROGRESS NOTES
"[facility]    SUBJECTIVE    Darius Roe is a 79 y.o. right-handed male who presents with   Chief Complaint   Patient presents with    Stroke     Follow up         Presents for follow up visit  Visit type: in-clinic visit    HISTORY OF PRESENT ILLNESS    RECAP:  Pt was admitted to [facility] from 7/16/24 - 7/17/24 for dizziness. Seen inpatient by Dr. Heard and myself.    PMH: afib, HTN, CAD, HLD and CHRISTINE     Presented initially for lightheadedness and worsening shortness of breath while playing horseshoes on prior Wednesday and was admitted for pneumonia from 07/11-07/13 and was discharged on 3 days of steroids, ceftin, and doxycycline. The patient got discharged but his dizziness remained unsolved. He notes that his dizziness is constant and he had trouble ambulating throughout the house. His dizziness comes on with standing and walking, he notes that he has hard time finding balance and had to \"hold onto railing the entire time for bathrooms\". He also has associated shortness of breath, nausea, and blurry vision with dizziness.     HCT with wedge shaped region of hypoattenuation of inferior medial L cerebellum suspicious for acute ischemia  MRI brain wo contrast - tiny acute infarct posterior L cerebellum, no mass effect or hemorrhage, B remote cerebellar infarcts and remote L basal ganglia infarct  MRA head/neck - no significant stenosis or LVO  TTE with low normal EF and normal LA size  A1C 6.7%  Lipid panel with LDL 23, CHOL 97,     Plan established including:  Continue eliquis. Instructed to change daily aspirin to Plavix 75 mg for possible aspirin failure. Continue HI statin. Monitor and manage stroke risk factors with PCP. Use CPAP.     08/07/24 -    Still off balance and dizzy while walking, getting better.     No PT after discharge.     [facility]         REVIEW OF SYSTEMS:  10 point review of systems performed and is negative except as noted in the HPI. "     Past Medical History:   Diagnosis Date    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 root (CMS-HCC)     Atrial fibrillation (Multi)     BPH (benign prostatic hyperplasia) 05/28/2018    CAD (coronary artery disease)     Diverticulosis 05/28/2018    Hypertension     NSTEMI (non-ST elevated myocardial infarction) (Multi) 05/28/2018       No relevant family history has been documented for this patient.    Past Surgical History:   Procedure Laterality Date    CORONARY ANGIOPLASTY  06/12/2018    PTCA    CT ANGIO NECK  11/9/2021    CT NECK ANGIO W AND WO IV CONTRAST 11/9/2021 [facility]    CT HEAD ANGIO W AND WO IV CONTRAST  11/10/2021    CT HEAD ANGIO W AND WO IV CONTRAST 11/10/2021 [facility]    MR NECK ANGIO W AND WO IV CONTRAST  11/10/2021    MR NECK ANGIO W AND WO IV CONTRAST 11/10/2021 [facility]    OTHER SURGICAL HISTORY  07/16/2020    Knee surgery       Social History     Substance and Sexual Activity   Drug Use Never     Tobacco Use: High Risk (8/7/2024)    Patient History     Smoking Tobacco Use: Some Days     Smokeless Tobacco Use: Never     Passive Exposure: Not on file     Alcohol Use: Not At Risk (7/16/2024)    AUDIT-C     Frequency of Alcohol Consumption: Never     Average Number of Drinks: Patient does not drink     Frequency of Binge Drinking: Never       Current Outpatient Medications   Medication Instructions    amLODIPine (Norvasc) 5 mg tablet 1 tablet, oral, Daily    apixaban (Eliquis) 5 mg tablet 1 tablet, oral, 2 times daily,  AS DIRECTED BY THE [facility] ([phone] EXT. [phone])<BR>    atorvastatin (LIPITOR) 40 mg, oral, Nightly    cholecalciferol (VITAMIN D-3) 25 mcg, oral, Daily, Take as directed    clopidogrel (PLAVIX) 75 mg, oral, Daily    losartan-hydrochlorothiazide (Hyzaar) 50-12.5 mg tablet 1 tablet, oral, Daily    multivitamin tablet 1 tablet, oral, Daily          OBJECTIVE    /85   Pulse 89   Wt 73.4 kg (161 lb 12.8 oz)   BMI 23.22  kg/m²       Physical Exam  Neurological:      Coordination: Romberg sign positive.   Psychiatric:         Speech: Speech normal.       Neurological Exam  Mental Status  Awake, alert and oriented to person, place and time. Recent and remote memory are intact. Speech is normal. Language is fluent with no aphasia. Attention and concentration are normal. Fund of knowledge is appropriate for level of education.    Cranial Nerves  CN II-XII grossly intact, no nystagmus.    Motor  Normal muscle bulk throughout. No fasciculations present. Normal muscle tone. No abnormal involuntary movements.  Strength at least antigravity throughout.    Sensory  Light touch is normal in upper and lower extremities.     Coordination  Right: Finger-to-nose normal.Left: Finger-to-nose normal.    Gait  Casual gait is normal including stance, stride, and arm swing. Romberg is present.        Transthoracic Echo (TTE) Complete 07/17/2024    PHYSICIAN INTERPRETATION:  Left Ventricle: The left ventricular systolic function is low normal, with a visually estimated ejection fraction of 50-55%. There are no regional wall motion abnormalities. The left ventricular cavity size is normal. Spectral Doppler shows a normal pattern of left ventricular diastolic filling.  Left Atrium: The left atrium is normal in size.  Right Ventricle: The right ventricle is normal in size. There is normal right ventricular global systolic function.  Right Atrium: The right atrium is normal in size.  Aortic Valve: The aortic valve is trileaflet. There is aortic valve annular calcification. The aortic valve dimensionless index is 0.71. There is mild aortic valve regurgitation. The peak instantaneous gradient of the aortic valve is 11.8 mmHg. The mean gradient of the aortic valve is 5.4 mmHg.  Mitral Valve: The mitral valve is mildly thickened. There is trace mitral valve regurgitation.  Tricuspid Valve: The tricuspid valve is structurally normal. There is trace to mild  tricuspid regurgitation. The Doppler estimated RVSP is mildly elevated at 35.6 mmHg.  Pulmonic Valve: The pulmonic valve is structurally normal. There is trace pulmonic valve regurgitation.  Pericardium: There is no pericardial effusion noted.  Aorta: The aortic root is normal. The ascending aorta is at the upper limits of normal.      CONCLUSIONS:  1. The left ventricular systolic function is low normal, with a visually estimated ejection fraction of 50-55%.  2. There is normal right ventricular global systolic function.  3. Mildly elevated RVSP.  4. There is aortic valve annular calcification.  5. Mild aortic valve regurgitation.    MR brain wo IV contrast 07/16/2024  MR angio neck wo IV contrast 07/16/2024  MR angio head wo IV contrast 07/16/2024    Narrative  Interpreted By:  Sandeep Chang,  STUDY:  MR BRAIN WO IV CONTRAST; MR ANGIO HEAD WO IV CONTRAST; MR ANGIO NECK  WO IV CONTRAST;  7/16/2024 3:44 pm    INDICATION:  Signs/Symptoms:dizziness, concern for stroke.    COMPARISON:  CT head dated 07/16/2020. MRI brain dated 11/10/2021. CTA head and  neck dated 11/10/2021.    ACCESSION NUMBER(S):  [id number]; [id number]; [id number]    ORDERING CLINICIAN:  TINO DONAHUE    TECHNIQUE:  1) Standard multiplanar multisequence MR imaging was performed  through the brain without intravenous contrast.  2) Noncontrast 3D time-of-flight MRA imaging was performed through  the brain.  3) Noncontrast 2D time-of-flight MRA imaging was performed through  the neck.    FINDINGS:  BRAIN:    Parenchyma: There is a tiny acute infarct involving the posterior  cortex of the left cerebellar hemisphere (series 204, image 80) with  corresponding T2/FLAIR hyperintensity. There is no mass effect or  hemorrhagic transformation. Tiny focus of T2 shine through suggested  within the right cerebellar hemisphere (series 204, image 88). There  is also a small focus of T2 shine through suggested within the  subcortical superior left frontal lobe  (series 204, image 240). No  evidence of recent hemorrhage. There is no mass effect or midline  shift. There is moderate chronic small vessel ischemic disease.  Additional remote bilateral cerebellar infarct components. Small  remote left basal ganglia lacunar infarct near the genu of the  internal capsule.    CSF Spaces: The ventricles, sulci and basal cisterns are within  normal limits for age with moderate generalized brain atrophy.  Basilar cisterns are patent.    Extra-axial spaces: No extra-axial fluid collection.    Paranasal Sinuses: Visualized paranasal sinuses are clear.    Mastoids: Clear.    Orbits: Bilateral native lens extractions.    Calvarium: No suspicious osseous marrow signal.      VASCULAR FINDINGS:    Common carotid arteries: Patent flow signal within the visualized  portions.    External carotid arteries: Patent flow signal bilaterally.    Internal carotid arteries: Patent flow signal bilaterally. Mild  atherosclerotic narrowing of the internal carotid artery origins with  no NASCET significant stenosis. Mild tortuosity of the cervical  internal carotid artery segments bilaterally. The right internal  carotid artery is slightly larger in caliber relative to the left.  The intracranial segments appear patent without flow significant  stenosis or definite aneurysm.    Anterior cerebral arteries: Hypoplastic versus absent left A1  segment. Otherwise normal flow signal bilaterally.    Middle cerebral arteries: Normal flow signal bilaterally.    Vertebral arteries: The visualized portions demonstrate patent flow  signal bilaterally with no flow significant focal stenosis. The right  vertebral artery is mildly dominant.    Basilar artery: Normal flow signal.    Posterior communicating arteries: Not well visualized, diminutive  versus absent.    Posterior cerebral arteries: Normal flow signal bilaterally.    Impression  Tiny acute infarct involving the posterior cortex of the left  cerebellum. No mass  effect or hemorrhagic transformation.    Moderate chronic small vessel ischemic disease with additional  bilateral remote cerebellar infarcts and additional remote ischemic  insults as described in detail above.    No evidence of source vessel arterial occlusion, flow significant  stenosis, or aneurysm within the head and neck. Anatomic variants as  described in detail above, similar to remote CTA.    MACRO:  Critical Finding:  Acute infarct. Notification was initiated on  7/16/2024 at 3:58 pm by  Sandeep Chang.  (**-OCF-**)    Signed by: Sandeep Chang 7/16/2024 3:58 PM  Dictation workstation:   [id number]      Lab Results   Component Value Date    WBC 11.8 (H) 07/17/2024    RBC 4.49 (L) 07/17/2024    HGB 15.0 07/17/2024    HCT 41.1 07/17/2024     07/17/2024     07/17/2024    K 3.8 07/17/2024     07/17/2024    BUN 20 07/17/2024    CREATININE 0.93 07/17/2024    EGFR 84 07/17/2024    CALCIUM 8.4 (L) 07/17/2024    ALKPHOS 58 07/17/2024    AST 13 07/17/2024    ALT 22 07/17/2024    MG 1.79 07/17/2024    HGBA1C 6.7 (H) 07/17/2024    LDLDIRECT 102 04/14/2021    LDLCALC 23 07/17/2024    CHOL 97 07/17/2024    HDL 28.4 07/17/2024    TRIG 229 (H) 07/17/2024    TSH 2.01 12/31/2019          ASSESSMENT & PLAN  Problem List Items Addressed This Visit       Cerebellar stroke (Multi) - Primary    Overview     July 2024  L cerebellum   MRI also showed remote B cerebellar infarcts and L basal ganglia infarct  On eliquis for afib  Daily 81 mg aspirin changed to 75 mg plavix daily  On HI statin  Risks include afib, HTN, CAD, HLD and CHRISTINE         Current Assessment & Plan     Still having dizziness with ambulation and standing, getting better  Ordered PT for vestibular therapy to help  [facility]. If so, he will come back to set up that at our PT downstairs.   Continue eliquis and plavix daily, states does not need fill on plavix, [facility]  Continue  HI statin  Continue stroke risk monitoring and management with [facility]         Relevant Orders    Referral to Physical Therapy    Follow Up In Neurology       Call 911 if symptoms of TIA/Stroke occur.  Medications: clopidogrel 75 mg orally every day and eliquis and HI statin  Risk Factor Management: Hypertension target range 130-140/70-80  Lipid range - LDL < 100 and checked every 6 months, fasting  Diabetes - HgB A1C <7      FU in 3 months       Allyson Bucio, APRN-CNP

## 2024-08-07 NOTE — ASSESSMENT & PLAN NOTE
Still having dizziness with ambulation and standing, getting better  Ordered PT for vestibular therapy to help  [facility] coming here. If so, he will come back to set up that at our PT downstairs.   Continue eliquis and plavix daily, states does not need fill on plavix, already got [facility]  Continue HI statin  Continue stroke risk monitoring and management with [facility]

## 2024-08-08 ENCOUNTER — HOSPITAL ENCOUNTER (OUTPATIENT)
Dept: CARDIOLOGY | Facility: HOSPITAL | Age: 79
Discharge: HOME | End: 2024-08-08
Payer: MEDICARE

## 2024-08-08 DIAGNOSIS — I25.10 ATHEROSCLEROTIC HEART DISEASE OF NATIVE CORONARY ARTERY WITHOUT ANGINA PECTORIS: ICD-10-CM

## 2024-08-08 DIAGNOSIS — I35.1 NONRHEUMATIC AORTIC (VALVE) INSUFFICIENCY: ICD-10-CM

## 2024-08-14 NOTE — PROGRESS NOTES
"Counseling:  The patient was counseled regarding diagnostic results, instructions for management, risk factor reductions, prognosis, patient and family education, impressions, risks and benefits of treatment options and importance of compliance with treatment.      Chief Complaint:   The patient presents today for 3-month followup of CAD, a-fib, HTN, and ascending aortic dilatation s/p echocardiogram.      History Of Present Illness:    Darius Roe is a 79 y.o. male patient who presents today for 3-month followup of CAD, a-fib, HTN, and ascending aortic dilatation s/p echocardiogram. His PMH is significant for CAD s/p PTCA/stent to the RCA 05/30/2018, paroxysmal atrial fibrillation (on Eliquis), aortic regurgitation, HTN, hyperlipidemia and syncope. Echocardiogram performed 07/17/2024 demonstrated an EF of 50-55%, normal RV systolic function, mildly elevated RVSP, mild aortic regurgitation, and mild ascending aortic dilatation (3.6 cm). Over the past 3 months, the patient states that he has done well from a cardiac standpoint. He denies any CP, chest discomfort or SOB. He also denies any recurrent syncope. The patient reports persistent episodes of postural dizziness and weakness, indicating that at these times his BP is low in the 90s/60s. He also reports episodes of low O2 saturations into the 80s. Per the patient, [facility] BP has been stable. The patient is compliant with his prescribed medications.      Last Recorded Vitals:  Vitals:    08/15/24 1225   BP: 122/78   BP Location: Left arm   Pulse: 71   Weight: 73.5 kg (162 lb)   Height: 1.778 m (5' 10\")          Family History:  Family History   Problem Relation Name Age of Onset    Other (malignant neoplasm of ovary) Mother      Heart attack Mother      Other (cerebrovascular accident) Father      Other (diabetes mellitus) Father      Lung cancer Father      Other (metastatic to brain) Father      Pancreatic cancer " Sister      Liver cancer Brother      Other (malignant neoplasm of prostate) Brother      Other (primary cervical cancer) Maternal Grandmother          Allergies:  Ciprofloxacin    Outpatient Medications:  Current Outpatient Medications   Medication Instructions    amLODIPine (Norvasc) 5 mg tablet 1 tablet, oral, Daily    apixaban (Eliquis) 5 mg tablet 1 tablet, oral, 2 times daily,  AS DIRECTED BY THE [facility] ([phone] EXT. [id number])<BR>    atorvastatin (LIPITOR) 40 mg, oral, Nightly    cholecalciferol (VITAMIN D-3) 25 mcg, oral, Daily, Take as directed    clopidogrel (PLAVIX) 75 mg, oral, Daily    losartan-hydrochlorothiazide (Hyzaar) 50-12.5 mg tablet 1 tablet, oral, Daily    multivitamin tablet 1 tablet, oral, Daily       Review of Systems   Neurological:  Positive for dizziness (postural).   All other systems reviewed and are negative.    Physical Exam:  Constitutional:       Appearance: Healthy appearance. Not in distress.   Neck:      Vascular: No JVR. JVD normal.   Pulmonary:      Effort: Pulmonary effort is normal.      Breath sounds: Normal breath sounds. No wheezing. No rhonchi. No rales.   Chest:      Chest wall: Not tender to palpatation.   Cardiovascular:      PMI at left midclavicular line. Normal rate. Regular rhythm. Normal S1. Normal S2.       Murmurs: There is no murmur.      No gallop.  No click. No rub.   Pulses:     Intact distal pulses.   Edema:     Peripheral edema absent.   Abdominal:      General: Bowel sounds are normal.      Palpations: Abdomen is soft.      Tenderness: There is no abdominal tenderness.   Musculoskeletal: Normal range of motion.         General: No tenderness. Skin:     General: Skin is warm and dry.   Neurological:      General: No focal deficit present.      Mental Status: Alert and oriented to person, place and time.       Last Labs:  CBC -  Lab Results   Component Value Date    WBC 11.8 (H) 07/17/2024    HGB 15.0 07/17/2024    HCT 41.1 07/17/2024    MCV 92  07/17/2024     07/17/2024       CMP -  Lab Results   Component Value Date    CALCIUM 8.4 (L) 07/17/2024    PHOS 2.7 07/10/2024    PROT 5.3 (L) 07/17/2024    ALBUMIN 2.9 (L) 07/17/2024    AST 13 07/17/2024    ALT 22 07/17/2024    ALKPHOS 58 07/17/2024    BILITOT 0.9 07/17/2024       LIPID PANEL -   Lab Results   Component Value Date    CHOL 97 07/17/2024    HDL 28.4 07/17/2024    CHHDL 3.4 07/17/2024    VLDL 46 (H) 07/17/2024    TRIG 229 (H) 07/17/2024    NHDL 69 07/17/2024       RENAL FUNCTION PANEL -   Lab Results   Component Value Date    K 3.8 07/17/2024    PHOS 2.7 07/10/2024       Lab Results   Component Value Date    BNP 18 07/16/2024    HGBA1C 6.7 (H) 07/17/2024       Last Cardiology Tests:  07/17/2024 - TTE  1. The left ventricular systolic function is low normal, with a visually estimated ejection fraction of 50-55%.  2. There is normal right ventricular global systolic function.  3. Mildly elevated RVSP.  4. There is aortic valve annular calcification.  5. Mild aortic valve regurgitation.    08/23/2023 - TTE  1. Left ventricular systolic function is low normal with a 50-55% estimated ejection fraction.  2. Spectral Doppler shows an impaired relaxation pattern of left ventricular diastolic filling.  3. There is asymmetric left ventricular hypertrophy.  4. Moderate aortic valve regurgitation.    08/23/2022 - TTE  1. Left ventricular systolic function is normal with a 55% estimated ejection fraction.  2. Spectral Doppler shows an impaired relaxation pattern of left ventricular diastolic filling.  3. Mild to moderate aortic valve regurgitation.    08/23/2022 - Stress Test  1. There is a moderate-sized primarily fixed perfusion defect in the inferior wall which resolves on prone imaging suggesting diaphragmatic attenuation. There is a low probability of ischemia. Calculated ejection fraction of 59% without wall motion abnormalities seen.  2. No clinical or electrocardiographic evidence for ischemia at  maximal infusion. No ECG changes from baseline. Normal Stress Test.    11/10/2021 - MRI/MRA Brain and Neck  1. MRI Brain: Punctate focus of hyperintense DWI signal left centrum semiovale is too small to definitely characterize, otherwise there is no evidence of acute infarct or intracranial hemorrhage. Mild diffuse parenchymal volume loss with non-specific white matter T2 and FLAIR signal changes, likely representing sequela of  small vessel disease.  2. MRA: Intracranial vertebral arteries are only partially visualized on current exam, with segment of intracranial vertebral artery described on previous CTA of the neck dated 11/09/2021 not fully included on the study. Dedicated MRA of the head is necessary for more a thorough evaluation. Within limits of the study, there is no evidence of major vessel cutoff or high-grade stenosis on MRA neck.    11/10/2021 - CTA Head  No evidence for high-grade stenosis or large branch vessel cutoffs of thee intracranial vessels. Specifically intracranial left vertebral artery is diminutive in appearance likely due to anatomic variation but otherwise grossly unremarkable.    11/09/2021 - CTA Neck  1. Potential filling defect due to thrombus or eccentric mural plaque of the intracranial vertebral artery on the left which is otherwise diminutive in caliber due to developmental variation. There is a short segment of at least moderate to severe narrowing. MRI of the brain may be of benefit for further evaluation.  2. Atherosclerotic changes of the carotid bifurcations and proximal ICAs with up to approximately 40-45% stenosis on the left.  3. Advanced, multilevel degenerative changes of the cervical spine.  4. Extensive emphysematous changes.    09/27/2021 - Implantation of Loop Recorder    08/23/2021 - CT Head  No acute intracranial process.    04/26/2021 to 05/25/2021 - Heart Monitor  1. Baseline transmission showing sinus rhythm with a heart rate of 80 bpm.   2. 2 episodes of SVT,  the longest of which was 13 seconds duration.   3. 2 pauses of greater than 2 seconds, the longest of which was 2.4 seconds.   4. No symptoms reported.  5. PAC burden of less than 1%.  6. PVC burden of less than 1%.     05/03/2021 - TTE  1. The left ventricular systolic function is normal with a 60-65% estimated ejection fraction.  2. There is mild aortic valve regurgitation.  3. 3.9 cm ascending aorta.    06/01/2020 - Cardiac Catheterization (LH)  Mild non-obstructive coronary artery disease.    05/12/2020 - TTE  1. The left ventricular systolic function is normal with a 55% estimated ejection fraction.  2. Spectral Doppler shows an impaired relaxation pattern of left ventricular diastolic filling.  3. The mitral valve is moderately thickened.  4. RVSP within normal limits.  5. Aortic valve stenosis is not present.  6. There is moderate aortic valve regurgitation.    04/15/2019 - Echocardiogram   1. Normal left ventricular regional systolic function with an ejection fraction of 55%.  2. Normal right ventricular size and systolic function.  3. Trace mitral regurgitation.  4. Moderate aortic regurgitation.  5. Mild tricuspid regurgitation.    05/30/2018 - Cardiac Catheterization (LH)  1. Single vessel coronary artery disease without proximal left anterior descending involvement.   2. Culprit vessel(s): right coronary artery.  3. Elevated LV filling pressures.  4. Left Ventricular end-diastolic pressure = 30.     Lab review: I have personally reviewed the laboratory result(s).  Diagnostic review: I have personally reviewed the result(s) of the Echocardiogram.     Assessment/Plan   1) CAD s/p PCI of RCA 2018  On Plavix 75 mg daily, atorvastatin 40 mg daily, amlodipine 5 mg daily, losartan-HCTZ 50-12.5 mg daily  Not on ASA as he is on Eliquis for a-fib  Goal LDL <70  Patient endorses a h/o sleep apnea, unable to tolerate CPAP - reports sleep disturbance and daytime fatigue, is a light sleeper.   TTE 07/17/2024 with LVEF  50-55%, normal RV systolic function, mildly elevated RVSP   Denies CP, chest discomfort or SOB  Reports persistent postural dizziness and weakness - BP at times of episodes is low in 90s/60s  Reports drops in O2 saturations   CT chest 07/16/2024 while inpatient with CVA prominent emphysematous disease of the lungs, most marked in the upper  lobes.  [facility]  Recommend establishing with a [facility]ologist through [facility]  Continue current medical Rx   Followup with Genoveva Gracia NP, in 6 months    2) Paroxysmal Atrial Fibrillation  [id number] score is 4  On Eliquis 5 mg BID  Stable and asymptomatic   Continue current medical Rx   Followup with Genoveva Gracia NP, in 6 months    3) Aortic Regurgitation  TTE May 2021 with mild AR  TTE 08/23/2023 with moderate AR  TTE 07/17/2024 with mild aortic regurgitation  Followup with Genoveva Gracia NP, in 6 months    4) HTN  Stable   On amlodipine 5 mg daily in the evening, losartan-HCTZ 50-12.5 mg daily in the morning  Carvedilol previously discontinued  ED evaluation 09/05/2023 at [facility] - BP elevated at 183/79  Patient reports that [facility] readings with multiple medication changes/adjustments.  Continue current medical Rx   Followup with Genoveva Gracia NP, in 6 months    5) Recurrent syncope  Has loop recorder  Recommend compression stockings  He attributes lightheadedness to amlodipine  Loop recorder interrogation 01/2023 with 3-4 pauses while sleeping - no change in POC per EP  Sinus pauses continue to be found on loop recorder  Denies recurrent syncope   Followup with Genoveva Gracia NP, in 6 months    6) Ascending Aorta Dilatation  3.9 cm on TTE May 2021  3.7 cm on TTE Aug 2023   3.6 cm on TTE July 2024  Followup with Genoveva Gracia NP, in 6 months    7) CVA  Hospital admission 07/16/2024 to 07/17/2024  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

## 2024-08-15 ENCOUNTER — OFFICE VISIT (OUTPATIENT)
Dept: CARDIOLOGY | Facility: HOSPITAL | Age: 79
End: 2024-08-15
Payer: MEDICARE

## 2024-08-15 VITALS
BODY MASS INDEX: 23.19 KG/M2 | DIASTOLIC BLOOD PRESSURE: 78 MMHG | SYSTOLIC BLOOD PRESSURE: 122 MMHG | WEIGHT: 162 LBS | HEART RATE: 71 BPM | HEIGHT: 70 IN

## 2024-08-15 DIAGNOSIS — I48.0 PAROXYSMAL ATRIAL FIBRILLATION (MULTI): Primary | ICD-10-CM

## 2024-08-15 PROCEDURE — 93005 ELECTROCARDIOGRAM TRACING: CPT | Performed by: INTERNAL MEDICINE

## 2024-08-15 PROCEDURE — 3074F SYST BP LT 130 MM HG: CPT | Performed by: INTERNAL MEDICINE

## 2024-08-15 PROCEDURE — 99213 OFFICE O/P EST LOW 20 MIN: CPT | Performed by: INTERNAL MEDICINE

## 2024-08-15 PROCEDURE — 1159F MED LIST DOCD IN RCRD: CPT | Performed by: INTERNAL MEDICINE

## 2024-08-15 PROCEDURE — 93010 ELECTROCARDIOGRAM REPORT: CPT | Performed by: INTERNAL MEDICINE

## 2024-08-15 PROCEDURE — 3078F DIAST BP <80 MM HG: CPT | Performed by: INTERNAL MEDICINE

## 2024-08-15 PROCEDURE — 1123F ACP DISCUSS/DSCN MKR DOCD: CPT | Performed by: INTERNAL MEDICINE

## 2024-08-15 PROCEDURE — 1160F RVW MEDS BY RX/DR IN RCRD: CPT | Performed by: INTERNAL MEDICINE

## 2024-08-15 ASSESSMENT — ENCOUNTER SYMPTOMS: DIZZINESS: 1

## 2024-08-15 NOTE — PATIENT INSTRUCTIONS
Continue all current medications as prescribed.  Attend [facility]  Dr. Lewis has recommended that you see a lung specialist with [facility].  Followup with Genoveva Gracia NP, in 6 months.      If you have any questions or cardiac concerns, please call our office at [phone].

## 2024-08-19 ENCOUNTER — CLINICAL SUPPORT (OUTPATIENT)
Dept: PHYSICAL THERAPY | Facility: HOSPITAL | Age: 79
End: 2024-08-19
Payer: MEDICARE

## 2024-08-19 DIAGNOSIS — R26.81 UNSTEADINESS ON FEET: Primary | ICD-10-CM

## 2024-08-19 DIAGNOSIS — I63.9 CEREBELLAR STROKE (MULTI): ICD-10-CM

## 2024-08-19 PROCEDURE — 97110 THERAPEUTIC EXERCISES: CPT | Mod: GP | Performed by: PHYSICAL THERAPIST

## 2024-08-19 PROCEDURE — 97161 PT EVAL LOW COMPLEX 20 MIN: CPT | Mod: GP | Performed by: PHYSICAL THERAPIST

## 2024-08-19 ASSESSMENT — PAIN - FUNCTIONAL ASSESSMENT: PAIN_FUNCTIONAL_ASSESSMENT: 0-10

## 2024-08-19 ASSESSMENT — ENCOUNTER SYMPTOMS
OCCASIONAL FEELINGS OF UNSTEADINESS: 0
LOSS OF SENSATION IN FEET: 0
DEPRESSION: 0

## 2024-08-19 ASSESSMENT — PAIN SCALES - GENERAL: PAINLEVEL_OUTOF10: 0 - NO PAIN

## 2024-08-19 ASSESSMENT — PATIENT HEALTH QUESTIONNAIRE - PHQ9
2. FEELING DOWN, DEPRESSED OR HOPELESS: NOT AT ALL
1. LITTLE INTEREST OR PLEASURE IN DOING THINGS: NOT AT ALL
SUM OF ALL RESPONSES TO PHQ9 QUESTIONS 1 AND 2: 0

## 2024-08-19 NOTE — PROGRESS NOTES
Physical Therapy    Physical Therapy Evaluation and Treatment / Discharge     Patient Name: Darius Roe  MRN: [id number]  : 1945   Today's Date: 2024  Time Calculation  Start Time: 1600  Stop Time: 1650  Time Calculation (min): 50 min     PT Evaluation Time Entry  PT Evaluation (Low) Time Entry: 40  PT Therapeutic Procedures Time Entry  Therapeutic Exercise Time Entry: 10      Visit # 1    Assessment:   Pt feels his only limitation is orthostatic lightheadedness which is being addressed by cardiology. He denies any difficulty with lack of function or gait instability though he states he can be a little dizzy still at times. He was issued a HEP of gaze stabilization but his overall balance is WNL and we agree he does not likely need further PT.    Plan:   Discharge to HEP.    Current Problem:   1. Unsteadiness on feet        2. Cerebellar stroke (Multi)  Referral to Physical Therapy          Subjective      Chief complaint: 7/10/24 pt got dizzy and was unable to walk. Went to ER, ended up being admitted, where he was treated for pneumonia but that didn't change his dizziness. Was told to go back to the ER where a CT scan showed cerebellar stroke. States his dizziness has gotten much better, not staggering anymore. Lightheaded if he gets up too quickly, drops in O2 saturation. States cardiology is aware of these symptoms. Hx of stent about 2018. Denies room spinning. Unsure why he's coming to PT.    Pain Better: NA    Pain Worse: NA    Imaging: Tiny acute infarct involving the posterior cortex of the left  cerebellum. No mass effect or hemorrhagic transformation.      Moderate chronic small vessel ischemic disease with additional  bilateral remote cerebellar infarcts and additional remote ischemic  insults as described in detail above.    Prior level of function: independent     Current limitations: None except when he gets up quickly he needs to rest    Home setup: lives with wife    Work:  retired    Patient's goal: Stop being dizzy    Precautions:  Precautions  STEADI Fall Risk Score (The score of 4 or more indicates an increased risk of falling): 0  Precautions Comment: None    Pain:  Pain Assessment  Pain Assessment: 0-10  0-10 (Numeric) Pain Score: 0 - No pain    Objective:  Objective   Cervical AROM: WFL    Left eye outward rotation  Smooth pursuits: WNL  Saccades: WNL  Convergence: NT    Head thrust test: (+) salvador though minor and inconsistent  VOR cancel: (-)    Resting nystagmus (blocked fixation): (-)  Gaze-evoked nystagmus (blocked fixation): (-) salvador    Feet apart, eyes open: WNL  Feet apart, eyes closed: WNL  Feet together, eyes open: WNL  Feet together, eyes closed: WNL  Compliant surface, eyes open: WNL  Compliant surface, eyes closed: WNL    Outcome Measures:  Other Measures  Dizziness Handicap Inventory: 48     Treatments:  EXERCISES Date  Date  Date Date   VISIT# # # # #    REPS REPS REPS REPS                        HEP: Access Code: [email or web address]  URL: [email or web address]  Date: 08/19/2024  Prepared by: Abdelrahman Guillen    Exercises  - Seated Gaze Stabilization with Head Rotation  - 2 x daily - 2 sets  - Seated Gaze Stabilization with Head Nod  - 2 x daily - 2 sets          Goals:  Resolved       Cerebellar stroke       Pt will be independent In a HEP of vestibular exercises. (Met)       Start:  08/19/24    Expected End:  11/17/24    Resolved:  08/19/24

## 2024-08-20 ENCOUNTER — PATIENT OUTREACH (OUTPATIENT)
Dept: CARE COORDINATION | Facility: CLINIC | Age: 79
End: 2024-08-20
Payer: MEDICARE

## 2024-08-20 ENCOUNTER — APPOINTMENT (OUTPATIENT)
Dept: CARDIOLOGY | Facility: HOSPITAL | Age: 79
End: 2024-08-20
Payer: MEDICARE

## 2024-08-20 NOTE — PROGRESS NOTES
"Outreach call to patient to check in 30 days after hospital discharge to support smooth transition of care. Patient states, he is doing \"pretty good.\" Patient states, the dizziness is much pretty much gone. Patient states, he worked Physical Therapy and was told that he does not need therapy. Patient with no additional needs noted. No additional outreach needed at this time.     CM will close case    Juli Ace RN/DANYA  [facility]  [phone]    "

## 2024-08-21 ENCOUNTER — APPOINTMENT (OUTPATIENT)
Dept: NEUROLOGY | Facility: HOSPITAL | Age: 79
End: 2024-08-21
Payer: MEDICARE

## 2024-09-03 DIAGNOSIS — I10 BENIGN ESSENTIAL HYPERTENSION: Primary | ICD-10-CM

## 2024-09-05 RX ORDER — AMLODIPINE BESYLATE 5 MG/1
5 TABLET ORAL DAILY
Qty: 90 TABLET | Refills: 1 | Status: SHIPPED | OUTPATIENT
Start: 2024-09-05

## 2024-09-17 ENCOUNTER — HOSPITAL ENCOUNTER (OUTPATIENT)
Dept: CARDIOLOGY | Facility: HOSPITAL | Age: 79
Discharge: HOME | End: 2024-09-17
Payer: MEDICARE

## 2024-09-17 DIAGNOSIS — R55 SYNCOPE AND COLLAPSE: ICD-10-CM

## 2024-09-17 PROCEDURE — 93298 REM INTERROG DEV EVAL SCRMS: CPT

## 2024-10-16 ENCOUNTER — HOSPITAL ENCOUNTER (OUTPATIENT)
Dept: CARDIOLOGY | Facility: HOSPITAL | Age: 79
Discharge: HOME | End: 2024-10-16
Payer: MEDICARE

## 2024-10-16 DIAGNOSIS — R55 SYNCOPE AND COLLAPSE: ICD-10-CM

## 2024-10-16 DIAGNOSIS — Z95.818 IMPLANTABLE LOOP RECORDER PRESENT: Primary | ICD-10-CM

## 2024-11-01 ENCOUNTER — HOSPITAL ENCOUNTER (OUTPATIENT)
Dept: CARDIOLOGY | Facility: HOSPITAL | Age: 79
Discharge: HOME | End: 2024-11-01
Payer: MEDICARE

## 2024-11-01 DIAGNOSIS — Z95.818 IMPLANTABLE LOOP RECORDER PRESENT: ICD-10-CM

## 2024-11-01 DIAGNOSIS — R55 SYNCOPE AND COLLAPSE: ICD-10-CM

## 2024-11-13 ENCOUNTER — OFFICE VISIT (OUTPATIENT)
Dept: NEUROLOGY | Facility: HOSPITAL | Age: 79
End: 2024-11-13
Payer: MEDICARE

## 2024-11-13 VITALS
DIASTOLIC BLOOD PRESSURE: 78 MMHG | HEART RATE: 55 BPM | WEIGHT: 160.6 LBS | BODY MASS INDEX: 23.04 KG/M2 | SYSTOLIC BLOOD PRESSURE: 160 MMHG

## 2024-11-13 DIAGNOSIS — R42 ORTHOSTATIC DIZZINESS: ICD-10-CM

## 2024-11-13 DIAGNOSIS — I63.9 CEREBELLAR STROKE (MULTI): Primary | ICD-10-CM

## 2024-11-13 PROCEDURE — 99214 OFFICE O/P EST MOD 30 MIN: CPT | Performed by: NURSE PRACTITIONER

## 2024-11-13 PROCEDURE — 1160F RVW MEDS BY RX/DR IN RCRD: CPT | Performed by: NURSE PRACTITIONER

## 2024-11-13 PROCEDURE — 1126F AMNT PAIN NOTED NONE PRSNT: CPT | Performed by: NURSE PRACTITIONER

## 2024-11-13 PROCEDURE — 3078F DIAST BP <80 MM HG: CPT | Performed by: NURSE PRACTITIONER

## 2024-11-13 PROCEDURE — 1159F MED LIST DOCD IN RCRD: CPT | Performed by: NURSE PRACTITIONER

## 2024-11-13 PROCEDURE — 3077F SYST BP >= 140 MM HG: CPT | Performed by: NURSE PRACTITIONER

## 2024-11-13 PROCEDURE — 1123F ACP DISCUSS/DSCN MKR DOCD: CPT | Performed by: NURSE PRACTITIONER

## 2024-11-13 RX ORDER — CLOPIDOGREL BISULFATE 75 MG/1
75 TABLET ORAL DAILY
COMMUNITY

## 2024-11-13 ASSESSMENT — ENCOUNTER SYMPTOMS
OCCASIONAL FEELINGS OF UNSTEADINESS: 1
LOSS OF SENSATION IN FEET: 0
DEPRESSION: 0

## 2024-11-13 ASSESSMENT — PAIN SCALES - GENERAL: PAINLEVEL_OUTOF10: 0-NO PAIN

## 2024-11-13 NOTE — ASSESSMENT & PLAN NOTE
Currently having difficulty tolerating 75 mg plavix due to fatigue. Asking about changing to other option, specifically wanting brilinta as he tolerated in the past but was not on OAC at that time. Discussed I will try to reach out to stroke neurology to discuss recommendations (? Brilinta vs maybe 325 mg aspirin instead of his Plavix). Pt agreeable. States he has about 20 doses left at home of the Plavix and would like to finish it out anyway.

## 2024-11-13 NOTE — PROGRESS NOTES
"[facility]    SUBJECTIVE    Darius Roe is a 79 y.o. right-handed male who presents with   Chief Complaint   Patient presents with    Stroke     3 month FU        Presents for follow up visit  Visit type: in-clinic visit    HISTORY OF PRESENT ILLNESS    RECAP:  Initially seen as hospital follow from 7/16/24 - 7/17/24 when he was seen for dizziness. PMH: afib, HTN, CAD, HLD and CHRISTINE      Presented initially for lightheadedness and worsening shortness of breath while playing horseshoes on prior Wednesday and was admitted for pneumonia from 07/11-07/13 and was discharged on 3 days of steroids, ceftin, and doxycycline. The patient got discharged but his dizziness remained unsolved. He notes that his dizziness is constant and he had trouble ambulating throughout the house. His dizziness comes on with standing and walking, he notes that he has hard time finding balance and had to \"hold onto railing the entire time for bathrooms\". He also has associated shortness of breath, nausea, and blurry vision with dizziness.      HCT with wedge shaped region of hypoattenuation of inferior medial L cerebellum suspicious for acute ischemia  MRI brain wo contrast - tiny acute infarct posterior L cerebellum, no mass effect or hemorrhage, B remote cerebellar infarcts and remote L basal ganglia infarct  MRA head/neck - no significant stenosis or LVO  TTE with low normal EF and normal LA size  A1C 6.7%  Lipid panel with LDL 23, CHOL 97,      Continue eliquis. Instructed to change daily aspirin to Plavix 75 mg for possible aspirin failure. Continue HI statin. Monitor and manage stroke risk factors with PCP. Use CPAP.     08/07/24 - Still off balance and dizzy while walking, getting better. No PT after discharge. [facility].     Ordered PT for vestibular therapy to help. Pt going to check with [facility] to see if they are ok with him coming here. If so, he will come back to set up that at our PT " "downstairs. Continue eliquis and plavix daily, states does not need fill on plavix, already got [facility]. Continue HI statin. Continue stroke risk monitoring and management with [facility]    11/13/24 - pt presents alone for follow up today.     Feeling fatigued and sluggish with plavix, ? SOB as well, states he did look up the side effects and feels that he is experiencing them. Was on brilinta for a year with a cardiac stent before per cardiology. Never had issue with brilinta but was not on OAC at that time. Asking to switch to that.     Intermittent \"wobbliness\" still since hospitalization but feels it is improving a lot.     He is concerned that his BP is dropping recently. Typically if out playing horseshoes or working in his garage for some time. Talked to [facility] and [facility], who did not seem to be concerned he states. Staying well hydrated. Getting lightheaded/weak with these episodes. Even after sitting for a while (15-20 min) his BP will be 97/57. He will go rest and it seems to get better after another 20-30 min.     Left foot goes numb intermittently, states this is baseline due to prior injury with orthopedic surgery. Otherwise does not have sensory changes and no history of neuropathy.      REVIEW OF SYSTEMS:  10 point review of systems performed and is negative except as noted in the HPI.     Past Medical History:   Diagnosis Date    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 root     Atrial fibrillation (Multi)     BPH (benign prostatic hyperplasia) 05/28/2018    CAD (coronary artery disease)     Diverticulosis 05/28/2018    Hypertension     NSTEMI (non-ST elevated myocardial infarction) (Multi) 05/28/2018       No relevant family history has been documented for this patient.    Past Surgical History:   Procedure Laterality Date    CORONARY ANGIOPLASTY  06/12/2018    PTCA    CT ANGIO NECK  11/9/2021    CT NECK ANGIO W AND WO IV CONTRAST 11/9/2021 [facility]    CT HEAD ANGIO W AND WO IV CONTRAST  " 11/10/2021    CT HEAD ANGIO W AND WO IV CONTRAST 11/10/2021 [facility]    MR NECK ANGIO W AND WO IV CONTRAST  11/10/2021    MR NECK ANGIO W AND WO IV CONTRAST 11/10/2021 [facility]    OTHER SURGICAL HISTORY  07/16/2020    Knee surgery       Social History     Substance and Sexual Activity   Drug Use Never     Tobacco Use: High Risk (11/13/2024)    Patient History     Smoking Tobacco Use: Some Days     Smokeless Tobacco Use: Never     Passive Exposure: Not on file     Alcohol Use: Not At Risk (7/16/2024)    AUDIT-C     Frequency of Alcohol Consumption: Never     Average Number of Drinks: Patient does not drink     Frequency of Binge Drinking: Never       Current Outpatient Medications   Medication Instructions    amLODIPine (NORVASC) 5 mg, oral, Daily    apixaban (Eliquis) 5 mg tablet 1 tablet, 2 times daily    atorvastatin (LIPITOR) 40 mg, oral, Nightly    cholecalciferol (VITAMIN D-3) 25 mcg, Daily    clopidogrel (PLAVIX) 75 mg, Daily    ibuprofen/diphenhydramine cit (ADVIL PM ORAL) Take by mouth.    losartan-hydrochlorothiazide (Hyzaar) 50-12.5 mg tablet 1 tablet, oral, Daily    multivitamin tablet 1 tablet, Daily         OBJECTIVE    /78   Pulse 55   Wt 72.8 kg (160 lb 9.6 oz)   BMI 23.04 kg/m²       Physical Exam  Eyes:      General: Lids are normal.      Extraocular Movements: Extraocular movements intact.   Psychiatric:         Speech: Speech normal.       Neurological Exam  Mental Status  Awake, alert and oriented to person, place and time. Oriented to person, place, time and situation. Recent and remote memory are intact. Speech is normal. Language is fluent with no aphasia. Attention and concentration are normal. Fund of knowledge is appropriate for level of education.    Cranial Nerves  CN III, IV, VI: Extraocular movements intact bilaterally. Normal lids and orbits bilaterally.  CN VII: Full and symmetric facial movement.  CN VIII: Hearing is normal.    Motor   No abnormal  involuntary movements.        Transthoracic Echo (TTE) Complete 07/17/2024    [place] [id number]  Phone [phone] Fax [phone]    TRANSTHORACIC ECHOCARDIOGRAM REPORT    Patient Name:      EAGLE HILL       Reading Physician:    [id number] Ladonna Newman MD  Study Date:        7/17/2024             Ordering Provider:    [id number] SIDNEY ANDERSEN  MRN/PID:           [id number]              Fellow:  Accession#:        [id number]          Nurse:  Date of Birth/Age: 1945 / 79 years  Sonographer:          Julianna Galicia  RDSUMMER  Gender:            M                     Additional Staff:  Height:            177.80 cm             Admit Date:  Weight:            73.48 kg              Admission Status:     Inpatient -  Routine  BSA / BMI:         1.91 m2 / 23.24 kg/m2 Department Location:  [facility] Echo  Lab  Blood Pressure: 126 /72 mmHg    Study Type:    TRANSTHORACIC ECHO (TTE) COMPLETE  Diagnosis/ICD: Dizziness and giddiness-R42; Unspecified atrial  fibrillation-I48.91  Indication:    Dizziness, AfIB  CPT Codes:     Echo Complete w Full Doppler-[id number]  Study Detail: The following Echo studies were performed: 2D, M-Mode, Doppler and  color flow.      PHYSICIAN INTERPRETATION:  Left Ventricle: The left ventricular systolic function is low normal, with a visually estimated ejection fraction of 50-55%. There are no regional wall motion abnormalities. The left ventricular cavity size is normal. Spectral Doppler shows a normal pattern of left ventricular diastolic filling.  Left Atrium: The left atrium is normal in size.  Right Ventricle: The right ventricle is normal in size. There is normal right ventricular global systolic function.  Right Atrium: The right atrium is normal in size.  Aortic Valve: The aortic valve is trileaflet. There is aortic valve annular calcification. The aortic valve dimensionless index is 0.71. There is mild aortic valve  regurgitation. The peak instantaneous gradient of the aortic valve is 11.8 mmHg. The mean gradient of the aortic valve is 5.4 mmHg.  Mitral Valve: The mitral valve is mildly thickened. There is trace mitral valve regurgitation.  Tricuspid Valve: The tricuspid valve is structurally normal. There is trace to mild tricuspid regurgitation. The Doppler estimated RVSP is mildly elevated at 35.6 mmHg.  Pulmonic Valve: The pulmonic valve is structurally normal. There is trace pulmonic valve regurgitation.  Pericardium: There is no pericardial effusion noted.  Aorta: The aortic root is normal. The ascending aorta is at the upper limits of normal.      CONCLUSIONS:  1. The left ventricular systolic function is low normal, with a visually estimated ejection fraction of 50-55%.  2. There is normal right ventricular global systolic function.  3. Mildly elevated RVSP.  4. There is aortic valve annular calcification.  5. Mild aortic valve regurgitation.    QUANTITATIVE DATA SUMMARY:  2D MEASUREMENTS:  Normal Ranges:  LAs:           3.00 cm   (2.7-4.0cm)  IVSd:          1.02 cm   (0.6-1.1cm)  LVPWd:         0.98 cm   (0.6-1.1cm)  LVIDd:         3.77 cm   (3.9-5.9cm)  LVIDs:         2.58 cm  LV Mass Index: 60.7 g/m2  LV % FS        31.7 %    LA VOLUME:  Normal Ranges:  LA Vol A4C:        21.5 ml    (22+/-6mL/m2)  LA Vol A2C:        23.1 ml  LA Vol BP:         22.3 ml  LA Vol Index A4C:  11.3ml/m2  LA Vol Index A2C:  12.1 ml/m2  LA Vol Index BP:   11.7 ml/m2  LA Area A4C:       10.9 cm2  LA Area A2C:       11.3 cm2  LA Major Axis A4C: 4.7 cm  LA Major Axis A2C: 4.7 cm  LA Vol A4C:        20.1 ml  LA Vol A2C:        22.2 ml    RA VOLUME BY A/L METHOD:  Normal Ranges:  RA Area A4C: 14.2 cm2    AORTA MEASUREMENTS:  Normal Ranges:  Ao Sinus, d: 3.60 cm (2.1-3.5cm)  Ao STJ, d:   2.80 cm (1.7-3.4cm)  Asc Ao, d:   3.60 cm (2.1-3.4cm)    LV SYSTOLIC FUNCTION BY 2D PLANIMETRY (MOD):  Normal Ranges:  EF-A4C View:    58 % (>=55%)  EF-A2C View:     52 %  EF-Visual:      53 %  LV EF Reported: 53 %    LV DIASTOLIC FUNCTION:  Normal Ranges:  MV Peak E:    0.51 m/s  (0.7-1.2 m/s)  MV Peak A:    0.87 m/s  (0.42-0.7 m/s)  E/A Ratio:    0.58      (1.0-2.2)  MV e'         0.088 m/s (>8.0)  MV lateral e' 0.07 m/s  MV medial e'  0.11 m/s  E/e' Ratio:   5.77      (<8.0)    MITRAL VALVE:  Normal Ranges:  MV DT: 336 msec (150-240msec)    AORTIC VALVE:  Normal Ranges:  AoV Vmax:                1.72 m/s  (<=1.7m/s)  AoV Peak P.8 mmHg (<20mmHg)  AoV Mean P.4 mmHg  (1.7-11.5mmHg)  LVOT Max Shivam:            1.04 m/s  (<=1.1m/s)  AoV VTI:                 27.03 cm  (18-25cm)  LVOT VTI:                19.29 cm  LVOT Diameter:           2.31 cm   (1.8-2.4cm)  AoV Area, VTI:           2.98 cm2  (2.5-5.5cm2)  AoV Area,Vmax:           2.52 cm2  (2.5-4.5cm2)  AoV Dimensionless Index: 0.71    AORTIC INSUFFICIENCY:  AI Vmax:       3.79 m/s  AI Half-time:  476 msec  AI Decel Time: 1643 msec  AI Decel Rate: 230.70 cm/s2      RIGHT VENTRICLE:  RV Basal 3.12 cm  RV Mid   2.10 cm  RV Major 7.0 cm  TAPSE:   21.7 mm  RV s'    0.21 m/s    TRICUSPID VALVE/RVSP:  Normal Ranges:  Peak TR Velocity: 2.85 m/s  RV Syst Pressure: 35.6 mmHg (< 30mmHg)  IVC Diam:         0.90 cm    AORTA:  Asc Ao Diam 3.57 cm      [id number] Ladonna Newman MD  Electronically signed on 2024 at 4:49:14 PM        ** Final **      Lab Results   Component Value Date    WBC 11.8 (H) 2024    RBC 4.49 (L) 2024    HGB 15.0 2024    HCT 41.1 2024     2024     2024    K 3.8 2024     2024    BUN 20 2024    CREATININE 0.93 2024    EGFR 84 2024    CALCIUM 8.4 (L) 2024    ALKPHOS 58 2024    AST 13 2024    ALT 22 2024    MG 1.79 2024    HGBA1C 6.7 (H) 2024    LDLDIRECT 102 2021    LDLCALC 23 2024    CHOL 97 2024    HDL 28.4 2024    TRIG 229 (H) 2024     TSH 2.01 12/31/2019          ASSESSMENT & PLAN  Problem List Items Addressed This Visit       Cerebellar stroke (Multi) - Primary    Overview     July 2024  L cerebellum   MRI also showed remote B cerebellar infarcts and L basal ganglia infarct  On eliquis for afib  Daily 81 mg aspirin changed to 75 mg plavix daily  On HI statin  Risks include afib, HTN, CAD, HLD and CHRISTINE         Current Assessment & Plan     Currently having difficulty tolerating 75 mg plavix due to fatigue. Asking about changing to other option, specifically wanting brilinta as he tolerated in the past but was not on OAC at that time. Discussed I will try to reach out to stroke neurology to discuss recommendations (? Brilinta vs maybe 325 mg aspirin instead of his Plavix). Pt agreeable. States he has about 20 doses left at home of the Plavix and would like to finish it out anyway.          Relevant Orders    Follow Up In Neurology     Other Visit Diagnoses       Orthostatic dizziness              Discussed about his orthostatic dizziness/lightheadedness complaints with correlating hypotension. Possibly medication related vs dysautonomia. Recommended compression stockings to help. [facility] to [facility] and [facility]ology about BP.     FU in 6 months - if doing well, can likely do PRN follow up        Allyson Bucio, APRN-CNP

## 2024-12-31 ENCOUNTER — HOSPITAL ENCOUNTER (OUTPATIENT)
Dept: CARDIOLOGY | Facility: HOSPITAL | Age: 79
Discharge: HOME | End: 2024-12-31
Payer: MEDICARE

## 2024-12-31 DIAGNOSIS — Z95.818 IMPLANTABLE LOOP RECORDER PRESENT: ICD-10-CM

## 2024-12-31 DIAGNOSIS — R55 SYNCOPE AND COLLAPSE: ICD-10-CM

## 2024-12-31 PROCEDURE — 93298 REM INTERROG DEV EVAL SCRMS: CPT

## 2025-01-15 ENCOUNTER — HOSPITAL ENCOUNTER (OUTPATIENT)
Dept: CARDIOLOGY | Facility: HOSPITAL | Age: 80
Discharge: HOME | End: 2025-01-15
Payer: MEDICARE

## 2025-01-15 DIAGNOSIS — R55 SYNCOPE AND COLLAPSE: ICD-10-CM

## 2025-01-15 DIAGNOSIS — Z95.818 IMPLANTABLE LOOP RECORDER PRESENT: Primary | ICD-10-CM

## 2025-01-15 DIAGNOSIS — Z95.818 IMPLANTABLE LOOP RECORDER PRESENT: ICD-10-CM

## 2025-01-16 ENCOUNTER — HOSPITAL ENCOUNTER (OUTPATIENT)
Dept: CARDIOLOGY | Facility: HOSPITAL | Age: 80
Discharge: HOME | End: 2025-01-16
Payer: MEDICARE

## 2025-01-16 DIAGNOSIS — R55 SYNCOPE AND COLLAPSE: ICD-10-CM

## 2025-01-16 DIAGNOSIS — Z95.818 IMPLANTABLE LOOP RECORDER PRESENT: ICD-10-CM

## 2025-01-22 ENCOUNTER — APPOINTMENT (OUTPATIENT)
Dept: CARDIOLOGY | Facility: HOSPITAL | Age: 80
End: 2025-01-22
Payer: MEDICARE

## 2025-02-17 ENCOUNTER — APPOINTMENT (OUTPATIENT)
Dept: CARDIOLOGY | Facility: HOSPITAL | Age: 80
End: 2025-02-17
Payer: MEDICARE

## 2025-02-18 ASSESSMENT — ENCOUNTER SYMPTOMS
LIGHT-HEADEDNESS: 1
ALTERED MENTAL STATUS: 0
WHEEZING: 0
SHORTNESS OF BREATH: 0
PALPITATIONS: 0
VOMITING: 0
NEAR-SYNCOPE: 0
ORTHOPNEA: 0
HEMATOCHEZIA: 0
PARESTHESIAS: 0
HEMATURIA: 0
COUGH: 0
FEVER: 0
NAUSEA: 0
NUMBNESS: 1
IRREGULAR HEARTBEAT: 0
DIZZINESS: 0
SYNCOPE: 0
CHILLS: 0

## 2025-02-18 NOTE — PROGRESS NOTES
Chief Complaint/Reason for Visit:  Follow-up (6 mo f/u) 6 month cardiovascular follow up    History Of Present Illness:    Darius Roe is a 79 y.o. male that presents to the office for 6 month follow up.    Taking medications as prescribed.     PMH significant for CAD status post PTCA/stent to the RCA 5/30/18, paroxysmal atrial fibrillation, aortic regurgitation, stroke July 2024 and COPD. He smokes cigars when golfing (about once a week).     He does monitor his BP at home and typically 130-140/70's.     EKG personally reviewed today showed SR with HR 80 bpm.  This will go to the cardiologist for final review.     Today he reports that BENAVIDES has been worsening over the past 1 month.  He states it is timed similar to when he received RSV vaccine. Denies any chest pain/pressure.  Worsening BENAVIDES feels similar to when he has needed PCI in the past.     Past Medical History:  He has a past medical history of 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 root, Atrial fibrillation (Multi), BPH (benign prostatic hyperplasia) (05/28/2018), CAD (coronary artery disease), Diverticulosis (05/28/2018), Hypertension, and NSTEMI (non-ST elevated myocardial infarction) (Multi) (05/28/2018).    Past Surgical History:  He has a past surgical history that includes Coronary angioplasty (06/12/2018); Other surgical history (07/16/2020); CT angio neck (11/9/2021); MR angio neck w and wo IV contrast (11/10/2021); and CT angio head w and wo IV contrast (11/10/2021).      Social History:  He reports that he has been smoking cigars. He has never used smokeless tobacco. He reports that he does not currently use alcohol after a past usage of about 1.0 standard drink of alcohol per week. He reports that he does not use drugs.    Family History:  Family History   Problem Relation Name Age of Onset    Other (malignant neoplasm of ovary) Mother      Heart attack Mother      Other (cerebrovascular accident) Father      Other (diabetes mellitus)  Father      Lung cancer Father      Other (metastatic to brain) Father      Pancreatic cancer Sister      Liver cancer Brother      Other (malignant neoplasm of prostate) Brother      Other (primary cervical cancer) Maternal Grandmother          Allergies:  Ciprofloxacin    Review of Systems   Constitutional: Negative for chills and fever.   Cardiovascular:  Positive for dyspnea on exertion (worsening). Negative for chest pain, irregular heartbeat, leg swelling, near-syncope, orthopnea, palpitations and syncope.   Respiratory:  Negative for cough, shortness of breath and wheezing.    Gastrointestinal:  Negative for hematochezia, melena, nausea and vomiting.   Genitourinary:  Negative for hematuria.   Neurological:  Positive for light-headedness (intermittent) and numbness (intermittent bilateral feet). Negative for dizziness and paresthesias.   Psychiatric/Behavioral:  Negative for altered mental status.        Objective      Vitals reviewed.   Constitutional:       Appearance: Chronically ill-appearing.   Pulmonary:      Effort: Pulmonary effort is normal.      Breath sounds: Decreased breath sounds present.      Comments: +diminished breath sounds throughout  Cardiovascular:      PMI at left midclavicular line. Normal rate. Regular rhythm. S1 with normal intensity. S2 with normal intensity.       Murmurs: There is no murmur.   Edema:     Peripheral edema absent.   Abdominal:      General: Bowel sounds are normal.   Skin:     General: Skin is warm and dry.   Psychiatric:         Attention and Perception: Attention normal.         Mood and Affect: Mood normal.         Behavior: Behavior is cooperative.         Current Outpatient Medications   Medication Instructions    amLODIPine (NORVASC) 5 mg, oral, Daily    apixaban (Eliquis) 5 mg tablet 1 tablet, 2 times daily    atorvastatin (LIPITOR) 40 mg, oral, Nightly    cholecalciferol (VITAMIN D-3) 25 mcg, Daily    ibuprofen/diphenhydramine cit (ADVIL PM ORAL) Take by  mouth.    losartan-hydrochlorothiazide (Hyzaar) 50-12.5 mg tablet 1 tablet, oral, Daily    multivitamin tablet 1 tablet, Daily        Reviewed the following Labs:  CBC -  Lab Results   Component Value Date    WBC 11.8 (H) 07/17/2024    HGB 15.0 07/17/2024    HCT 41.1 07/17/2024    MCV 92 07/17/2024     07/17/2024       RENAL FUNCTION PANEL -   Lab Results   Component Value Date    GLUCOSE 117 (H) 07/17/2024     07/17/2024    K 3.8 07/17/2024     07/17/2024    CO2 27 07/17/2024    ANIONGAP 9 (L) 07/17/2024    BUN 20 07/17/2024    CREATININE 0.93 07/17/2024    GFRMALE 82 09/02/2023    CALCIUM 8.4 (L) 07/17/2024    PHOS 2.7 07/10/2024    ALBUMIN 2.9 (L) 07/17/2024        CMP -  Lab Results   Component Value Date    CALCIUM 8.4 (L) 07/17/2024    PHOS 2.7 07/10/2024    PROT 5.3 (L) 07/17/2024    ALBUMIN 2.9 (L) 07/17/2024    AST 13 07/17/2024    ALT 22 07/17/2024    ALKPHOS 58 07/17/2024    BILITOT 0.9 07/17/2024       LIPID PANEL -   Lab Results   Component Value Date    CHOL 97 07/17/2024    TRIG 229 (H) 07/17/2024    HDL 28.4 07/17/2024    CHHDL 3.4 07/17/2024    LDLF 78 04/14/2021    VLDL 46 (H) 07/17/2024    NHDL 69 07/17/2024     Lab Results   Component Value Date    LDLCALC 23 07/17/2024       Lab Results   Component Value Date    BNP 18 07/16/2024    HGBA1C 6.7 (H) 07/17/2024       Lab Results   Component Value Date    TSH 2.01 12/31/2019       No results found for this or any previous visit.     Reviewed the following Cardiology Tests:    Last implantable loop recorder interrogation 1/16/25.    TTE 7/17/24  1. The left ventricular systolic function is low normal, with a visually estimated ejection fraction of 50-55%.   2. There is normal right ventricular global systolic function.   3. Mildly elevated RVSP.   4. There is aortic valve annular calcification.   5. Mild aortic valve regurgitation.    Echo 8/23/23:   1. Left ventricular systolic function is low normal with a 50-55% estimated  "ejection fraction.  2. Spectral Doppler shows an impaired relaxation pattern of left ventricular diastolic filling.  3. There is asymmetric left ventricular hypertrophy.  4. Moderate aortic valve regurgitation.    Stress test 8/23/22:   1. Correlate with myocardial perfusion imaging results.   2. No clinical or electrocardiographic evidence for ischemia at maximal infusion.   3. No ECG changes from baseline.   4. Normal Stress Test.   5. Nuclear image results are reported separately.  1. There is a moderate-sized primarily fixed perfusion defect in the  inferior wall which resolves on prone imaging suggesting  diaphragmatic attenuation. There is a low probability of ischemia.  2. Calculated ejection fraction of 59% without wall motion  abnormalities seen.      TTE 5/3/2021 showed LV systolic function is normal with an EF of 60 to 65%. Mild aortic valve regurgitation. 3.9 cm ascending aorta.     Event recorder 4/26/2021 through 5/25/2021 showed no atrial fibrillation, heart block or VT. Baseline transmission showing sinus rhythm with a heart rate of 80 bpm. 2 episodes of SVT longest of which was 13 seconds duration. Longest pause of 2.4 seconds.     LHC 6/1/2020 showed patent stent to the RCA. Mild nonobstructive CAD.     LHC 5/30/18 showed 90% stenosis of the mid RCA.he is status post PCI of the RCA. Elevated LV filling pressures. LV end-diastolic pressure was 30.    Visit Vitals  /60 (BP Location: Right arm)   Pulse 60   Ht 1.778 m (5' 10\")   Wt 73.9 kg (163 lb)   BMI 23.39 kg/m²   Smoking Status Some Days   BSA 1.91 m²       Assessment/Plan   The primary encounter diagnosis was Coronary artery disease involving native coronary artery of native heart without angina pectoris. Diagnoses of Paroxysmal atrial fibrillation (Multi), Aortic valve insufficiency, etiology of cardiac valve disease unspecified, Benign essential hypertension, Ascending aorta dilatation (CMS-HCC), and Hyperlipidemia, unspecified " hyperlipidemia type were also pertinent to this visit.    1. CAD s/p PCI of RCA 2018  Continue aspirin 81 mg daily.  Continue atorvastatin 80 mg daily  Beta blocker stopped previously likely 2/2 nocturnal pauses on loop recorder (h/o CHRISTINE)   Stress test Aug 2022 with low probability of ischemia  TTE Aug 2023 with LVEF 50-55%  TTE July 2024 with LVEF 50-55%  He reports that he was unable to use treadmill in the past d/t balance issues  Having worsening BENAVIDES that feels similar to when he needed PCI in the past.  Concern for anginal equivalent  Check regadenoson stress test  Check limited echocardiogram for LVEF     2. Paroxysmal atrial fibrillation  [id number] score is 4. (Hypertension - Yes, 1 point, Vascular Disease - Yes, 1 point, and Age over 75 years - 2 points)  Continue apixaban 5 mg BID    Beta blocker stopped previously likely 2/2 nocturnal pauses on loop recorder   He reports that he has a [employer] mobile device that has not detected any atrial fibrillation.     3. Aortic Regurgitation  TTE May 2021 with mild AR  TTE Aug 2023 with moderate aortic valve regurgitation  TTE July 2024 with mild aortic valve regurgitation     4. HTN  Stable  Continue losartan-hydrochlorothiazide 50-12.5 mg daily and amlodipine 5 mg daily  He reports issues with intermittent lightheadedness upon standing, but not everyday.  Offered to decrease amlodipine 2.5 mg daily and he declined as his BP has been stable at home.  No episodes of syncope.     5. Recurrent syncope  Has loop recorder  Having recurrent lightheadedness upon standing. Recommend compression stockings  Has nocturnal pauses noted on loop recorder that are documented to have occurred when he was not wearing CPAP  He reports that his monitoring device in his bedroom for ILR lights up 2-3 times per day and wakes him up at night.  He also reports being charged a quarterly fee for device.  He inquired about stopping monitoring ILR.  Educated that it is recommended to continue  monitoring ILR until battery is depleted.  He verbalized understanding, but report that he may discuss with device clinic about stopping interrogations.      6. Ascending aorta dilatation  3.9 cm on TTE May 2021  TTE Aug 2023 with ascending aorta 3.7 cm and aortic root 4 cm  TTE July 2024 with ascending aorta 3.6 cm; aorta sinus 3.6 cm    7. Dyslipidemia  Goal LDL <70.   Jan 2025, but he reports that he had run out of atorvastatin 40 mg as he was [facility].  Continue atorvastatin 40 mg daily.   Check [facility] per the [facility]    8. Stroke July 2024  Management per neurology  Neurology changed aspirin to clopidogrel d/t concern for aspirin failure, but now patient reports that he is taking apixaban and aspirin 81 mg daily     9. DM  Management per PCP  Hemoglobin A1c 6.5% on 1/10/25    Reviewed lipid panel, CMP and CBC from 1/10/25 that are available in [facility] visit documentation.    Genoveva Gracia, APRN-CNP

## 2025-02-18 NOTE — PATIENT INSTRUCTIONS
Recommend Mediterranean style of eating  Continue current medications  Check stress test and limited echocardiogram   Follow-up with RITA Tomas or Dr. Lewis in 1 month  If you have any questions or cardiac concerns, please call our office at [phone].

## 2025-02-19 ENCOUNTER — OFFICE VISIT (OUTPATIENT)
Dept: CARDIOLOGY | Facility: HOSPITAL | Age: 80
End: 2025-02-19
Payer: MEDICARE

## 2025-02-19 VITALS
HEART RATE: 60 BPM | BODY MASS INDEX: 23.34 KG/M2 | HEIGHT: 70 IN | WEIGHT: 163 LBS | DIASTOLIC BLOOD PRESSURE: 60 MMHG | SYSTOLIC BLOOD PRESSURE: 120 MMHG

## 2025-02-19 DIAGNOSIS — I48.0 PAROXYSMAL ATRIAL FIBRILLATION (MULTI): ICD-10-CM

## 2025-02-19 DIAGNOSIS — I35.1 AORTIC VALVE INSUFFICIENCY, ETIOLOGY OF CARDIAC VALVE DISEASE UNSPECIFIED: ICD-10-CM

## 2025-02-19 DIAGNOSIS — I77.810 ASCENDING AORTA DILATATION (CMS-HCC): ICD-10-CM

## 2025-02-19 DIAGNOSIS — E78.5 HYPERLIPIDEMIA, UNSPECIFIED HYPERLIPIDEMIA TYPE: ICD-10-CM

## 2025-02-19 DIAGNOSIS — I25.10 CORONARY ARTERY DISEASE INVOLVING NATIVE CORONARY ARTERY OF NATIVE HEART WITHOUT ANGINA PECTORIS: Primary | ICD-10-CM

## 2025-02-19 DIAGNOSIS — I10 BENIGN ESSENTIAL HYPERTENSION: ICD-10-CM

## 2025-02-19 DIAGNOSIS — R06.09 DYSPNEA ON EXERTION: ICD-10-CM

## 2025-02-19 LAB
ATRIAL RATE: 80 BPM
P AXIS: 79 DEGREES
P OFFSET: 200 MS
P ONSET: 148 MS
PR INTERVAL: 158 MS
Q ONSET: 227 MS
QRS COUNT: 13 BEATS
QRS DURATION: 86 MS
QT INTERVAL: 348 MS
QTC CALCULATION(BAZETT): 401 MS
QTC FREDERICIA: 383 MS
R AXIS: -43 DEGREES
T AXIS: 74 DEGREES
T OFFSET: 401 MS
VENTRICULAR RATE: 80 BPM

## 2025-02-19 PROCEDURE — 1159F MED LIST DOCD IN RCRD: CPT | Performed by: NURSE PRACTITIONER

## 2025-02-19 PROCEDURE — 3078F DIAST BP <80 MM HG: CPT | Performed by: NURSE PRACTITIONER

## 2025-02-19 PROCEDURE — 1123F ACP DISCUSS/DSCN MKR DOCD: CPT | Performed by: NURSE PRACTITIONER

## 2025-02-19 PROCEDURE — 93005 ELECTROCARDIOGRAM TRACING: CPT | Performed by: NURSE PRACTITIONER

## 2025-02-19 PROCEDURE — 3074F SYST BP LT 130 MM HG: CPT | Performed by: NURSE PRACTITIONER

## 2025-02-19 PROCEDURE — 99214 OFFICE O/P EST MOD 30 MIN: CPT | Performed by: NURSE PRACTITIONER

## 2025-02-19 PROCEDURE — 1160F RVW MEDS BY RX/DR IN RCRD: CPT | Performed by: NURSE PRACTITIONER

## 2025-02-19 PROCEDURE — 99214 OFFICE O/P EST MOD 30 MIN: CPT | Mod: 25 | Performed by: NURSE PRACTITIONER

## 2025-02-19 RX ORDER — ASPIRIN 81 MG/1
81 TABLET ORAL DAILY
COMMUNITY

## 2025-02-19 ASSESSMENT — ENCOUNTER SYMPTOMS
LOSS OF SENSATION IN FEET: 1
OCCASIONAL FEELINGS OF UNSTEADINESS: 1
DYSPNEA ON EXERTION: 1
DEPRESSION: 0

## 2025-02-22 DIAGNOSIS — I10 BENIGN ESSENTIAL HYPERTENSION: ICD-10-CM

## 2025-02-27 RX ORDER — AMLODIPINE BESYLATE 5 MG/1
5 TABLET ORAL DAILY
Qty: 90 TABLET | Refills: 3 | Status: SHIPPED | OUTPATIENT
Start: 2025-02-27

## 2025-02-28 ENCOUNTER — HOSPITAL ENCOUNTER (OUTPATIENT)
Dept: CARDIOLOGY | Facility: HOSPITAL | Age: 80
Discharge: HOME | End: 2025-02-28
Payer: MEDICARE

## 2025-02-28 DIAGNOSIS — R55 SYNCOPE AND COLLAPSE: ICD-10-CM

## 2025-02-28 DIAGNOSIS — Z95.818 IMPLANTABLE LOOP RECORDER PRESENT: ICD-10-CM

## 2025-03-10 ENCOUNTER — HOSPITAL ENCOUNTER (OUTPATIENT)
Dept: CARDIOLOGY | Facility: HOSPITAL | Age: 80
Discharge: HOME | End: 2025-03-10
Payer: MEDICARE

## 2025-03-10 ENCOUNTER — HOSPITAL ENCOUNTER (OUTPATIENT)
Dept: RADIOLOGY | Facility: HOSPITAL | Age: 80
Discharge: HOME | End: 2025-03-10
Payer: MEDICARE

## 2025-03-10 DIAGNOSIS — R06.09 DYSPNEA ON EXERTION: ICD-10-CM

## 2025-03-10 PROCEDURE — 93016 CV STRESS TEST SUPVJ ONLY: CPT | Performed by: INTERNAL MEDICINE

## 2025-03-10 PROCEDURE — A9502 TC99M TETROFOSMIN: HCPCS | Performed by: INTERNAL MEDICINE

## 2025-03-10 PROCEDURE — 2500000004 HC RX 250 GENERAL PHARMACY W/ HCPCS (ALT 636 FOR OP/ED): Performed by: NURSE PRACTITIONER

## 2025-03-10 PROCEDURE — 3430000001 HC RX 343 DIAGNOSTIC RADIOPHARMACEUTICALS: Performed by: INTERNAL MEDICINE

## 2025-03-10 PROCEDURE — 93308 TTE F-UP OR LMTD: CPT | Performed by: INTERNAL MEDICINE

## 2025-03-10 PROCEDURE — 78452 HT MUSCLE IMAGE SPECT MULT: CPT

## 2025-03-10 PROCEDURE — 93017 CV STRESS TEST TRACING ONLY: CPT

## 2025-03-10 PROCEDURE — 78452 HT MUSCLE IMAGE SPECT MULT: CPT | Performed by: INTERNAL MEDICINE

## 2025-03-10 PROCEDURE — 93308 TTE F-UP OR LMTD: CPT

## 2025-03-10 PROCEDURE — 93018 CV STRESS TEST I&R ONLY: CPT | Performed by: INTERNAL MEDICINE

## 2025-03-10 RX ORDER — REGADENOSON 0.08 MG/ML
0.4 INJECTION, SOLUTION INTRAVENOUS ONCE
Status: COMPLETED | OUTPATIENT
Start: 2025-03-10 | End: 2025-03-10

## 2025-03-10 RX ADMIN — TETROFOSMIN 10 MILLICURIE: 0.23 INJECTION, POWDER, LYOPHILIZED, FOR SOLUTION INTRAVENOUS at 08:55

## 2025-03-10 RX ADMIN — REGADENOSON 0.4 MG: 0.08 INJECTION, SOLUTION INTRAVENOUS at 10:10

## 2025-03-10 RX ADMIN — TETROFOSMIN 30 MILLICURIE: 0.23 INJECTION, POWDER, LYOPHILIZED, FOR SOLUTION INTRAVENOUS at 10:00

## 2025-03-11 LAB
EJECTION FRACTION APICAL 4 CHAMBER: 52.6
EJECTION FRACTION: 53 %
LEFT ATRIUM VOLUME AREA LENGTH INDEX BSA: 9.1 ML/M2
LEFT VENTRICLE INTERNAL DIMENSION DIASTOLE: 4.63 CM (ref 3.5–6)
LV EJECTION FRACTION BIPLANE: 55 %
MITRAL VALVE E/A RATIO: 1.75
RIGHT VENTRICLE PEAK SYSTOLIC PRESSURE: 30 MMHG

## 2025-03-18 ENCOUNTER — OFFICE VISIT (OUTPATIENT)
Dept: CARDIOLOGY | Facility: HOSPITAL | Age: 80
End: 2025-03-18
Payer: MEDICARE

## 2025-03-18 VITALS
SYSTOLIC BLOOD PRESSURE: 144 MMHG | WEIGHT: 164 LBS | BODY MASS INDEX: 23.48 KG/M2 | HEART RATE: 70 BPM | DIASTOLIC BLOOD PRESSURE: 78 MMHG | HEIGHT: 70 IN

## 2025-03-18 DIAGNOSIS — I48.91 ATRIAL FIBRILLATION, UNSPECIFIED TYPE (MULTI): ICD-10-CM

## 2025-03-18 PROCEDURE — 1160F RVW MEDS BY RX/DR IN RCRD: CPT | Performed by: INTERNAL MEDICINE

## 2025-03-18 PROCEDURE — 99213 OFFICE O/P EST LOW 20 MIN: CPT | Performed by: INTERNAL MEDICINE

## 2025-03-18 PROCEDURE — 99213 OFFICE O/P EST LOW 20 MIN: CPT | Mod: 25 | Performed by: INTERNAL MEDICINE

## 2025-03-18 PROCEDURE — 93010 ELECTROCARDIOGRAM REPORT: CPT | Performed by: INTERNAL MEDICINE

## 2025-03-18 PROCEDURE — 1159F MED LIST DOCD IN RCRD: CPT | Performed by: INTERNAL MEDICINE

## 2025-03-18 PROCEDURE — 3078F DIAST BP <80 MM HG: CPT | Performed by: INTERNAL MEDICINE

## 2025-03-18 PROCEDURE — 1123F ACP DISCUSS/DSCN MKR DOCD: CPT | Performed by: INTERNAL MEDICINE

## 2025-03-18 PROCEDURE — 93005 ELECTROCARDIOGRAM TRACING: CPT | Performed by: INTERNAL MEDICINE

## 2025-03-18 PROCEDURE — 3077F SYST BP >= 140 MM HG: CPT | Performed by: INTERNAL MEDICINE

## 2025-03-18 ASSESSMENT — ENCOUNTER SYMPTOMS
LIGHT-HEADEDNESS: 1
DYSPNEA ON EXERTION: 1

## 2025-03-18 NOTE — PROGRESS NOTES
"Counseling:  The patient was counseled regarding diagnostic results, instructions for management, risk factor reductions, prognosis, patient and family education, impressions, risks and benefits of treatment options and importance of compliance with treatment.      Chief Complaint:   The patient presents today for 1-month followup of worsening exertional SOB s/p stress test and echocardiogram.      History Of Present Illness:    Darius Roe is a 80 y.o. male patient who presents today for 1-month followup of worsening exertional SOB s/p stress test and echocardiogram. His PMH is significant for CAD s/p PTCA/stent to the RCA 05/30/2018, paroxysmal atrial fibrillation (on Eliquis), aortic regurgitation, HTN, hyperlipidemia and syncope. On 03/10/2025, echocardiogram demonstrated an EF of 50-55%, and stress testing was negative for ischemia. Today, the patient reports persistent exertional SOB, as well as lightheadedness.       Last Recorded Vitals:  Vitals:    03/18/25 1438   BP: 144/78   BP Location: Left arm   Patient Position: Sitting   BP Cuff Size: Adult   Pulse: 70   Weight: 74.4 kg (164 lb)   Height: 1.778 m (5' 10\")        Family History:  Family History   Problem Relation Name Age of Onset    Other (malignant neoplasm of ovary) Mother      Heart attack Mother      Other (cerebrovascular accident) Father      Other (diabetes mellitus) Father      Lung cancer Father      Other (metastatic to brain) Father      Pancreatic cancer Sister      Liver cancer Brother      Other (malignant neoplasm of prostate) Brother      Other (primary cervical cancer) Maternal Grandmother          Allergies:  Ciprofloxacin    Outpatient Medications:  Current Outpatient Medications   Medication Instructions    amLODIPine (NORVASC) 5 mg, oral, Daily    apixaban (Eliquis) 5 mg tablet 1 tablet, 2 times daily    aspirin 81 mg, Daily    atorvastatin (LIPITOR) 40 mg, oral, Nightly    cholecalciferol (VITAMIN D-3) 25 mcg, Daily    " ibuprofen/diphenhydramine cit (ADVIL PM ORAL) Take by mouth.    losartan-hydrochlorothiazide (Hyzaar) 50-12.5 mg tablet 1 tablet, oral, Daily    multivitamin tablet 1 tablet, Daily       Review of Systems   Cardiovascular:  Positive for dyspnea on exertion.   Neurological:  Positive for light-headedness.   All other systems reviewed and are negative.    Physical Exam:  Constitutional:       Appearance: Healthy appearance. Not in distress.   Neck:      Vascular: No JVR. JVD normal.   Pulmonary:      Effort: Pulmonary effort is normal.      Breath sounds: Normal breath sounds. No wheezing. No rhonchi. No rales.   Chest:      Chest wall: Not tender to palpatation.   Cardiovascular:      PMI at left midclavicular line. Normal rate. Regular rhythm. Normal S1. Normal S2.       Murmurs: There is no murmur.      No gallop.  No click. No rub.   Pulses:     Intact distal pulses.   Edema:     Peripheral edema absent.   Abdominal:      General: Bowel sounds are normal.      Palpations: Abdomen is soft.      Tenderness: There is no abdominal tenderness.   Musculoskeletal: Normal range of motion.         General: No tenderness. Skin:     General: Skin is warm and dry.   Neurological:      General: No focal deficit present.      Mental Status: Alert and oriented to person, place and time.       Last Labs:  CBC -  Lab Results   Component Value Date    WBC 11.8 (H) 07/17/2024    HGB 15.0 07/17/2024    HCT 41.1 07/17/2024    MCV 92 07/17/2024     07/17/2024       CMP -  Lab Results   Component Value Date    CALCIUM 8.4 (L) 07/17/2024    PHOS 2.7 07/10/2024    PROT 5.3 (L) 07/17/2024    ALBUMIN 2.9 (L) 07/17/2024    AST 13 07/17/2024    ALT 22 07/17/2024    ALKPHOS 58 07/17/2024    BILITOT 0.9 07/17/2024       LIPID PANEL -   Lab Results   Component Value Date    CHOL 97 07/17/2024    HDL 28.4 07/17/2024    CHHDL 3.4 07/17/2024    VLDL 46 (H) 07/17/2024    TRIG 229 (H) 07/17/2024    NHDL 69 07/17/2024       RENAL FUNCTION PANEL  -   Lab Results   Component Value Date    K 3.8 07/17/2024    PHOS 2.7 07/10/2024       Lab Results   Component Value Date    BNP 18 07/16/2024    HGBA1C 6.7 (H) 07/17/2024       Last Cardiology Tests:  03/10/2025 - Stress Test  1. Normal stress myocardial perfusion imaging in response to pharmacologic stress. Well-maintained left ventricular function. Pertinent findings if any on low-dose non gated CT images-moderate coronary artery calcification and calcification of the thoracic aorta.   2. No evidence for ischemia at maximal infusion.     03/10/2025 - TTE  The left ventricular systolic function is low normal, with a visually estimated ejection fraction of 50-55%.     07/17/2024 - TTE  1. The left ventricular systolic function is low normal, with a visually estimated ejection fraction of 50-55%.  2. There is normal right ventricular global systolic function.  3. Mildly elevated RVSP.  4. There is aortic valve annular calcification.  5. Mild aortic valve regurgitation.    08/23/2023 - TTE  1. Left ventricular systolic function is low normal with a 50-55% estimated ejection fraction.  2. Spectral Doppler shows an impaired relaxation pattern of left ventricular diastolic filling.  3. There is asymmetric left ventricular hypertrophy.  4. Moderate aortic valve regurgitation.    08/23/2022 - TTE  1. Left ventricular systolic function is normal with a 55% estimated ejection fraction.  2. Spectral Doppler shows an impaired relaxation pattern of left ventricular diastolic filling.  3. Mild to moderate aortic valve regurgitation.    08/23/2022 - Stress Test  1. There is a moderate-sized primarily fixed perfusion defect in the inferior wall which resolves on prone imaging suggesting diaphragmatic attenuation. There is a low probability of ischemia. Calculated ejection fraction of 59% without wall motion abnormalities seen.  2. No clinical or electrocardiographic evidence for ischemia at maximal infusion. No ECG changes from  baseline. Normal Stress Test.    11/10/2021 - MRI/MRA Brain and Neck  1. MRI Brain: Punctate focus of hyperintense DWI signal left centrum semiovale is too small to definitely characterize, otherwise there is no evidence of acute infarct or intracranial hemorrhage. Mild diffuse parenchymal volume loss with non-specific white matter T2 and FLAIR signal changes, likely representing sequela of  small vessel disease.  2. MRA: Intracranial vertebral arteries are only partially visualized on current exam, with segment of intracranial vertebral artery described on previous CTA of the neck dated 11/09/2021 not fully included on the study. Dedicated MRA of the head is necessary for more a thorough evaluation. Within limits of the study, there is no evidence of major vessel cutoff or high-grade stenosis on MRA neck.    11/10/2021 - CTA Head  No evidence for high-grade stenosis or large branch vessel cutoffs of thee intracranial vessels. Specifically intracranial left vertebral artery is diminutive in appearance likely due to anatomic variation but otherwise grossly unremarkable.    11/09/2021 - CTA Neck  1. Potential filling defect due to thrombus or eccentric mural plaque of the intracranial vertebral artery on the left which is otherwise diminutive in caliber due to developmental variation. There is a short segment of at least moderate to severe narrowing. MRI of the brain may be of benefit for further evaluation.  2. Atherosclerotic changes of the carotid bifurcations and proximal ICAs with up to approximately 40-45% stenosis on the left.  3. Advanced, multilevel degenerative changes of the cervical spine.  4. Extensive emphysematous changes.    09/27/2021 - Implantation of Loop Recorder    08/23/2021 - CT Head  No acute intracranial process.    04/26/2021 to 05/25/2021 - Heart Monitor  1. Baseline transmission showing sinus rhythm with a heart rate of 80 bpm.   2. 2 episodes of SVT, the longest of which was 13 seconds  duration.   3. 2 pauses of greater than 2 seconds, the longest of which was 2.4 seconds.   4. No symptoms reported.  5. PAC burden of less than 1%.  6. PVC burden of less than 1%.     05/03/2021 - TTE  1. The left ventricular systolic function is normal with a 60-65% estimated ejection fraction.  2. There is mild aortic valve regurgitation.  3. 3.9 cm ascending aorta.    06/01/2020 - Cardiac Catheterization ([facility])  Mild non-obstructive coronary artery disease.    05/12/2020 - TTE  1. The left ventricular systolic function is normal with a 55% estimated ejection fraction.  2. Spectral Doppler shows an impaired relaxation pattern of left ventricular diastolic filling.  3. The mitral valve is moderately thickened.  4. RVSP within normal limits.  5. Aortic valve stenosis is not present.  6. There is moderate aortic valve regurgitation.    04/15/2019 - Echocardiogram   1. Normal left ventricular regional systolic function with an ejection fraction of 55%.  2. Normal right ventricular size and systolic function.  3. Trace mitral regurgitation.  4. Moderate aortic regurgitation.  5. Mild tricuspid regurgitation.    05/30/2018 - Cardiac Catheterization ([facility])  1. Single vessel coronary artery disease without proximal left anterior descending involvement.   2. Culprit vessel(s): right coronary artery.  3. Elevated LV filling pressures.  4. Left Ventricular end-diastolic pressure = 30.     Diagnostic review: I have personally reviewed the result(s) of the Echocardiogram and Stress Test.    Assessment/Plan   1) CAD s/p PCI of RCA 2018  On atorvastatin 40 mg daily, amlodipine 5 mg daily, losartan-HCTZ 50-12.5 mg daily  Not on ASA as he is on Eliquis for a-fib  Goal LDL <70  Patient endorses a h/o sleep apnea, unable to tolerate CPAP   CT chest 07/16/2024 while inpatient with CVA prominent emphysematous disease of the lungs, most marked in the upper lobes.  [facility]  TTE  03/10/2025 with LVEF 50-55%  Stress 03/10/2025 negative for ischemia   SOB likely s/t COPD/emphysema - [facility]  Continue current medical Rx   Followup with Genoveva Gracia NP, in 6 months    2) Paroxysmal Atrial Fibrillation  [id number] score is 4  On Eliquis 5 mg BID  Stable and asymptomatic   Continue current medical Rx   Followup with Genoveva Gracia NP, in 6 months    3) Aortic Regurgitation  TTE May 2021 with mild AR  TTE 08/23/2023 with moderate AR  TTE 07/17/2024 with mild aortic regurgitation  BP stable  Followup with Genoveva Gracia NP, in 6 months    4) HTN  Stable   On amlodipine 5 mg daily in the evening, losartan-HCTZ 50-12.5 mg daily in the morning  Carvedilol previously discontinued  ED evaluation [facility] at [facility] - BP elevated at 183/79  Continue current medical Rx   Followup with Genoveva Gracia NP, in 6 months    5) Recurrent syncope  Has loop recorder  Recommend compression stockings  He attributes lightheadedness to amlodipine  Loop recorder interrogation 01/2023 with 3-4 pauses while sleeping - no change in POC per EP  Sinus pauses continue to be found on loop recorder  Denies recurrent syncope     6) Ascending Aorta Dilatation  3.7 cm on TTE Aug 2023   3.6 cm on TTE July 2024  Followup with Genoveva Gracia NP, in 6 months    7) CVA  Hospital admission 07/16/2024 to 07/17/2024  Found to have acute cerebellar stroke     8) Emphysema   Persistent SOB and lightheadedness  Recommend followup with pulmonary   CTA chest 07/16/2024 with marked emphysematous disease in the upper lobes   [facility]        Scribe Attestation  By signing my name below, I, Rosalina Salgado   attest that this documentation has been prepared under the direction and in the presence of Ko Lewis MD.

## 2025-03-18 NOTE — PATIENT INSTRUCTIONS
Continue all current medications as prescribed.  Dr. Lewis has recommended that you [facility] for [facility] of your shortness of breath.   Followup with Genoveva Gracia NP, in 6 months.      If you have any questions or cardiac concerns, please call our office at [phone].

## 2025-03-18 NOTE — Clinical Note
"March 18, 2025     No Recipients    Patient: Darius Roe   YOB: 1945   Date of Visit: 3/18/2025       Dear Dr. Kwon Recipients:    Thank you for referring Darius Roe to me for evaluation. Below are my notes for this consultation.  If you have questions, please do not hesitate to call me. I look forward to following your patient along with you.       Sincerely,     Ko Lewis MD      CC: No Recipients  ______________________________________________________________________________________    Counseling:  The patient was counseled regarding diagnostic results, instructions for management, risk factor reductions, prognosis, patient and family education, impressions, risks and benefits of treatment options and importance of compliance with treatment.      Chief Complaint:   The patient presents today for 1-month followup of worsening exertional SOB s/p stress test and echocardiogram.      History Of Present Illness:    Darius Roe is a 80 y.o. male patient who presents today for 1-month followup of worsening exertional SOB s/p stress test and echocardiogram. His PMH is significant for CAD s/p PTCA/stent to the RCA 05/30/2018, paroxysmal atrial fibrillation (on Eliquis), aortic regurgitation, HTN, hyperlipidemia and syncope. On 03/10/2025, echocardiogram demonstrated an EF of 50-55%, and stress testing was negative for ischemia.      Last Recorded Vitals:  Vitals:    03/18/25 1438   BP: 144/78   BP Location: Left arm   Patient Position: Sitting   BP Cuff Size: Adult   Pulse: 70   Weight: 74.4 kg (164 lb)   Height: 1.778 m (5' 10\")        Family History:  Family History   Problem Relation Name Age of Onset    Other (malignant neoplasm of ovary) Mother      Heart attack Mother      Other (cerebrovascular accident) Father      Other (diabetes mellitus) Father      Lung cancer Father      Other (metastatic to brain) Father      Pancreatic cancer Sister      Liver cancer Brother      Other (malignant " neoplasm of prostate) Brother      Other (primary cervical cancer) Maternal Grandmother          Allergies:  Ciprofloxacin    Outpatient Medications:  Current Outpatient Medications   Medication Instructions    amLODIPine (NORVASC) 5 mg, oral, Daily    apixaban (Eliquis) 5 mg tablet 1 tablet, 2 times daily    aspirin 81 mg, Daily    atorvastatin (LIPITOR) 40 mg, oral, Nightly    cholecalciferol (VITAMIN D-3) 25 mcg, Daily    ibuprofen/diphenhydramine cit (ADVIL PM ORAL) Take by mouth.    losartan-hydrochlorothiazide (Hyzaar) 50-12.5 mg tablet 1 tablet, oral, Daily    multivitamin tablet 1 tablet, Daily       Review of Systems   All other systems reviewed and are negative.    Physical Exam:  Constitutional:       Appearance: Healthy appearance. Not in distress.   Neck:      Vascular: No JVR. JVD normal.   Pulmonary:      Effort: Pulmonary effort is normal.      Breath sounds: Normal breath sounds. No wheezing. No rhonchi. No rales.   Chest:      Chest wall: Not tender to palpatation.   Cardiovascular:      PMI at left midclavicular line. Normal rate. Regular rhythm. Normal S1. Normal S2.       Murmurs: There is no murmur.      No gallop.  No click. No rub.   Pulses:     Intact distal pulses.   Edema:     Peripheral edema absent.   Abdominal:      General: Bowel sounds are normal.      Palpations: Abdomen is soft.      Tenderness: There is no abdominal tenderness.   Musculoskeletal: Normal range of motion.         General: No tenderness. Skin:     General: Skin is warm and dry.   Neurological:      General: No focal deficit present.      Mental Status: Alert and oriented to person, place and time.       Last Labs:  CBC -  Lab Results   Component Value Date    WBC 11.8 (H) 07/17/2024    HGB 15.0 07/17/2024    HCT 41.1 07/17/2024    MCV 92 07/17/2024     07/17/2024       CMP -  Lab Results   Component Value Date    CALCIUM 8.4 (L) 07/17/2024    PHOS 2.7 07/10/2024    PROT 5.3 (L) 07/17/2024    ALBUMIN 2.9 (L)  07/17/2024    AST 13 07/17/2024    ALT 22 07/17/2024    ALKPHOS 58 07/17/2024    BILITOT 0.9 07/17/2024       LIPID PANEL -   Lab Results   Component Value Date    CHOL 97 07/17/2024    HDL 28.4 07/17/2024    CHHDL 3.4 07/17/2024    VLDL 46 (H) 07/17/2024    TRIG 229 (H) 07/17/2024    NHDL 69 07/17/2024       RENAL FUNCTION PANEL -   Lab Results   Component Value Date    K 3.8 07/17/2024    PHOS 2.7 07/10/2024       Lab Results   Component Value Date    BNP 18 07/16/2024    HGBA1C 6.7 (H) 07/17/2024       Last Cardiology Tests:  03/10/2025 - Stress Test  1. Normal stress myocardial perfusion imaging in response to pharmacologic stress. Well-maintained left ventricular function. Pertinent findings if any on low-dose non gated CT images-moderate coronary artery calcification and calcification of the thoracic aorta.   2. No evidence for ischemia at maximal infusion.     03/10/2025 - TTE  The left ventricular systolic function is low normal, with a visually estimated ejection fraction of 50-55%.     07/17/2024 - TTE  1. The left ventricular systolic function is low normal, with a visually estimated ejection fraction of 50-55%.  2. There is normal right ventricular global systolic function.  3. Mildly elevated RVSP.  4. There is aortic valve annular calcification.  5. Mild aortic valve regurgitation.    08/23/2023 - TTE  1. Left ventricular systolic function is low normal with a 50-55% estimated ejection fraction.  2. Spectral Doppler shows an impaired relaxation pattern of left ventricular diastolic filling.  3. There is asymmetric left ventricular hypertrophy.  4. Moderate aortic valve regurgitation.    08/23/2022 - TTE  1. Left ventricular systolic function is normal with a 55% estimated ejection fraction.  2. Spectral Doppler shows an impaired relaxation pattern of left ventricular diastolic filling.  3. Mild to moderate aortic valve regurgitation.    08/23/2022 - Stress Test  1. There is a moderate-sized primarily  fixed perfusion defect in the inferior wall which resolves on prone imaging suggesting diaphragmatic attenuation. There is a low probability of ischemia. Calculated ejection fraction of 59% without wall motion abnormalities seen.  2. No clinical or electrocardiographic evidence for ischemia at maximal infusion. No ECG changes from baseline. Normal Stress Test.    11/10/2021 - MRI/MRA Brain and Neck  1. MRI Brain: Punctate focus of hyperintense DWI signal left centrum semiovale is too small to definitely characterize, otherwise there is no evidence of acute infarct or intracranial hemorrhage. Mild diffuse parenchymal volume loss with non-specific white matter T2 and FLAIR signal changes, likely representing sequela of  small vessel disease.  2. MRA: Intracranial vertebral arteries are only partially visualized on current exam, with segment of intracranial vertebral artery described on previous CTA of the neck dated 11/09/2021 not fully included on the study. Dedicated MRA of the head is necessary for more a thorough evaluation. Within limits of the study, there is no evidence of major vessel cutoff or high-grade stenosis on MRA neck.    11/10/2021 - CTA Head  No evidence for high-grade stenosis or large branch vessel cutoffs of thee intracranial vessels. Specifically intracranial left vertebral artery is diminutive in appearance likely due to anatomic variation but otherwise grossly unremarkable.    11/09/2021 - CTA Neck  1. Potential filling defect due to thrombus or eccentric mural plaque of the intracranial vertebral artery on the left which is otherwise diminutive in caliber due to developmental variation. There is a short segment of at least moderate to severe narrowing. MRI of the brain may be of benefit for further evaluation.  2. Atherosclerotic changes of the carotid bifurcations and proximal ICAs with up to approximately 40-45% stenosis on the left.  3. Advanced, multilevel degenerative changes of the  cervical spine.  4. Extensive emphysematous changes.    09/27/2021 - Implantation of Loop Recorder    08/23/2021 - CT Head  No acute intracranial process.    04/26/2021 to 05/25/2021 - Heart Monitor  1. Baseline transmission showing sinus rhythm with a heart rate of 80 bpm.   2. 2 episodes of SVT, the longest of which was 13 seconds duration.   3. 2 pauses of greater than 2 seconds, the longest of which was 2.4 seconds.   4. No symptoms reported.  5. PAC burden of less than 1%.  6. PVC burden of less than 1%.     05/03/2021 - TTE  1. The left ventricular systolic function is normal with a 60-65% estimated ejection fraction.  2. There is mild aortic valve regurgitation.  3. 3.9 cm ascending aorta.    06/01/2020 - Cardiac Catheterization ([facility])  Mild non-obstructive coronary artery disease.    05/12/2020 - TTE  1. The left ventricular systolic function is normal with a 55% estimated ejection fraction.  2. Spectral Doppler shows an impaired relaxation pattern of left ventricular diastolic filling.  3. The mitral valve is moderately thickened.  4. RVSP within normal limits.  5. Aortic valve stenosis is not present.  6. There is moderate aortic valve regurgitation.    04/15/2019 - Echocardiogram   1. Normal left ventricular regional systolic function with an ejection fraction of 55%.  2. Normal right ventricular size and systolic function.  3. Trace mitral regurgitation.  4. Moderate aortic regurgitation.  5. Mild tricuspid regurgitation.    05/30/2018 - Cardiac Catheterization (LH)  1. Single vessel coronary artery disease without proximal left anterior descending involvement.   2. Culprit vessel(s): right coronary artery.  3. Elevated LV filling pressures.  4. Left Ventricular end-diastolic pressure = 30.     Diagnostic review: I have personally reviewed the result(s) of the Echocardiogram and Stress Test.    Assessment/Plan  1) CAD s/p PCI of RCA 2018  On atorvastatin 40 mg daily, amlodipine 5 mg daily, losartan-HCTZ  50-12.5 mg daily  Not on ASA as he is on Eliquis for a-fib  Goal LDL <70  Patient endorses a h/o sleep apnea, unable to tolerate CPAP   CT chest 07/16/2024 while inpatient with CVA prominent emphysematous disease of the lungs, most marked in the upper lobes.  [facility]  TTE 03/10/2025 with LVEF 50-55%  Stress 03/10/2025 negative for ischemia     2) Paroxysmal Atrial Fibrillation  [id number] score is 4  On Eliquis 5 mg BID  Stable and asymptomatic     3) Aortic Regurgitation  TTE May 2021 with mild AR  TTE 08/23/2023 with moderate AR  TTE 07/17/2024 with mild aortic regurgitation    4) HTN  Stable   On amlodipine 5 mg daily in the evening, losartan-HCTZ 50-12.5 mg daily in the morning  Carvedilol previously discontinued  ED evaluation 09/05/20[facility] at [facility] - BP elevated at 183/79    5) Recurrent syncope  Has loop recorder  Recommend compression stockings  He attributes lightheadedness to amlodipine  Loop recorder interrogation 01/2023 with 3-4 pauses while sleeping - no change in POC per EP  Sinus pauses continue to be found on loop recorder  Denies recurrent syncope     6) Ascending Aorta Dilatation  3.7 cm on TTE Aug 2023   3.6 cm on TTE July 2024    7) CVA  Hospital admission 07/16/2024 to 07/17/2024  Found to have acute cerebellar stroke       Scribe Attestation  By signing my name below, I, Rosalina Salgado   attest that this documentation has been prepared under the direction and in the presence of Ko Lewis MD.

## 2025-03-20 LAB
ATRIAL RATE: 70 BPM
ATRIAL RATE: 80 BPM
P AXIS: 70 DEGREES
P AXIS: 79 DEGREES
P OFFSET: 196 MS
P OFFSET: 200 MS
P ONSET: 140 MS
P ONSET: 148 MS
PR INTERVAL: 158 MS
PR INTERVAL: 178 MS
Q ONSET: 227 MS
Q ONSET: 229 MS
QRS COUNT: 11 BEATS
QRS COUNT: 13 BEATS
QRS DURATION: 80 MS
QRS DURATION: 86 MS
QT INTERVAL: 348 MS
QT INTERVAL: 394 MS
QTC CALCULATION(BAZETT): 401 MS
QTC CALCULATION(BAZETT): 425 MS
QTC FREDERICIA: 383 MS
QTC FREDERICIA: 415 MS
R AXIS: -31 DEGREES
R AXIS: -43 DEGREES
T AXIS: 29 DEGREES
T AXIS: 74 DEGREES
T OFFSET: 401 MS
T OFFSET: 426 MS
VENTRICULAR RATE: 70 BPM
VENTRICULAR RATE: 80 BPM

## 2025-04-16 ENCOUNTER — APPOINTMENT (OUTPATIENT)
Dept: CARDIOLOGY | Facility: HOSPITAL | Age: 80
End: 2025-04-16
Payer: MEDICARE

## 2025-05-13 ENCOUNTER — OFFICE VISIT (OUTPATIENT)
Dept: NEUROLOGY | Facility: HOSPITAL | Age: 80
End: 2025-05-13
Payer: MEDICARE

## 2025-05-13 VITALS
BODY MASS INDEX: 22.53 KG/M2 | SYSTOLIC BLOOD PRESSURE: 106 MMHG | WEIGHT: 157 LBS | HEART RATE: 84 BPM | DIASTOLIC BLOOD PRESSURE: 68 MMHG

## 2025-05-13 DIAGNOSIS — I63.9 CEREBELLAR STROKE (MULTI): ICD-10-CM

## 2025-05-13 PROCEDURE — 1126F AMNT PAIN NOTED NONE PRSNT: CPT | Performed by: NURSE PRACTITIONER

## 2025-05-13 PROCEDURE — 99214 OFFICE O/P EST MOD 30 MIN: CPT | Performed by: NURSE PRACTITIONER

## 2025-05-13 PROCEDURE — 3074F SYST BP LT 130 MM HG: CPT | Performed by: NURSE PRACTITIONER

## 2025-05-13 PROCEDURE — 1159F MED LIST DOCD IN RCRD: CPT | Performed by: NURSE PRACTITIONER

## 2025-05-13 PROCEDURE — 3078F DIAST BP <80 MM HG: CPT | Performed by: NURSE PRACTITIONER

## 2025-05-13 PROCEDURE — 1160F RVW MEDS BY RX/DR IN RCRD: CPT | Performed by: NURSE PRACTITIONER

## 2025-05-13 ASSESSMENT — ENCOUNTER SYMPTOMS
LOSS OF SENSATION IN FEET: 1
DEPRESSION: 0
OCCASIONAL FEELINGS OF UNSTEADINESS: 1

## 2025-05-13 ASSESSMENT — PAIN SCALES - GENERAL: PAINLEVEL_OUTOF10: 0-NO PAIN

## 2025-05-13 NOTE — PROGRESS NOTES
"[facility]    SUBJECTIVE    Darius Roe is a 80 y.o. right-handed male who presents with   Chief Complaint   Patient presents with    Stroke        Presents for follow up visit  Visit type: in-clinic visit    HISTORY OF PRESENT ILLNESS    RECAP:  Initially seen as hospital follow from 7/16/24 - 7/17/24 when he was seen for dizziness. PMH: afib, HTN, CAD, HLD and CHRISTINE      Presented initially for lightheadedness and worsening shortness of breath while playing horseshoes on prior Wednesday and was admitted for pneumonia from 07/11-07/13 and was discharged on 3 days of steroids, ceftin, and doxycycline. The patient got discharged but his dizziness remained unsolved. He notes that his dizziness is constant and he had trouble ambulating throughout the house. His dizziness comes on with standing and walking, he notes that he has hard time finding balance and had to \"hold onto railing the entire time for bathrooms\". He also has associated shortness of breath, nausea, and blurry vision with dizziness.      HCT with wedge shaped region of hypoattenuation of inferior medial L cerebellum suspicious for acute ischemia  MRI brain wo contrast - tiny acute infarct posterior L cerebellum, no mass effect or hemorrhage, B remote cerebellar infarcts and remote L basal ganglia infarct  MRA head/neck - no significant stenosis or LVO  TTE with low normal EF and normal LA size  A1C 6.7%  Lipid panel with LDL 23, CHOL 97,      Continue eliquis. Instructed to change daily aspirin to Plavix 75 mg for possible aspirin failure. Continue HI statin. Monitor and manage stroke risk factors with PCP. Use CPAP.      08/07/24 - Still off balance and dizzy while walking, getting better. No PT after discharge. [facility].      Ordered PT for vestibular therapy to help. Pt going to check with [facility] to see if they are ok with him coming here. If so, he will come back to set up that at our PT downstairs. Continue " "eliquis and plavix daily, states does not need fill on plavix, already got from [facility]. Continue HI statin. Continue stroke risk monitoring and management with [facility]    11/13/24 - Feeling fatigued and sluggish with plavix, ? SOB as well, states he did look up the side effects and feels that he is experiencing them. Was on brilinta for a year with a cardiac stent before per cardiology. Never had issue with brilinta but was not on OAC at that time. Asking to switch to that.      Intermittent \"wobbliness\" still since hospitalization but feels it is improving a lot.      He is concerned that his BP is dropping recently. Typically if out playing horseshoes or working in his garage for some time. [facility]ed to [facility] and [facility], who did not seem to be concerned he states. Staying well hydrated. Getting lightheaded/weak with these episodes. Even after sitting for a while (15-20 min) his BP will be 97/57. He will go rest and it seems to get better after another 20-30 min.      Left foot goes numb intermittently, states this is baseline due to prior injury with orthopedic surgery. Otherwise does not have sensory changes and no history of neuropathy.      Discussed about his orthostatic dizziness/lightheadedness complaints with correlating hypotension. Possibly medication related vs dysautonomia. Recommended compression stockings to help. Continue to follow with [facility] and cardiology about BP.     05/13/25 - presents alone for today's visit.     BP is lower end. Pt states it was doing better recently but dropping again in past week with his URI illness. Getting lightheaded again, has to sit down with symptoms when he is up doing things and gets lightheaded. States he knows he has not been eating and drinking as well because he does not feel good. States it seems to be getting slightly better but he does have productive cough, worse in morning.     LLE was getting weak after walking through store last week. [place] like it was \"going " "to sleep\". Got better by the time he got home. States he feels it was related to the increase in activity. L foot has numbness issues related to prior surgery. States this was just radiating up his leg. No low back pain.     He did try compression socks for lightheadedness/BP issues. States he did not notice difference. Dr. Lewis did have him adjust dosing of HTN meds to evening instead of morning, he does think this was helping until the recent illness over past several days.     On eliquis and aspirin. Tolerating well. Was unable to tolerate plavix.     No further dizziness/vertigo episodes at all.     REVIEW OF SYSTEMS:  10 point review of systems performed and is negative except as noted in the HPI.     Medical History[1]    No relevant family history has been documented for this patient.    Surgical History[2]    Social History     Substance and Sexual Activity   Drug Use Never     Tobacco Use: High Risk (5/13/2025)    Patient History     Smoking Tobacco Use: Some Days     Smokeless Tobacco Use: Never     Passive Exposure: Not on file     Alcohol Use: Not At Risk (7/16/2024)    AUDIT-C     Frequency of Alcohol Consumption: Never     Average Number of Drinks: Patient does not drink     Frequency of Binge Drinking: Never       Current Outpatient Medications   Medication Instructions    amLODIPine (NORVASC) 5 mg, oral, Daily    apixaban (Eliquis) 5 mg tablet 1 tablet, 2 times daily    aspirin 81 mg, Daily    atorvastatin (LIPITOR) 40 mg, oral, Nightly    cholecalciferol (VITAMIN D-3) 25 mcg, Daily    ibuprofen/diphenhydramine cit (ADVIL PM ORAL) Take by mouth.    losartan-hydrochlorothiazide (Hyzaar) 50-12.5 mg tablet 1 tablet, oral, Daily    multivitamin tablet 1 tablet, Daily         OBJECTIVE    /68   Pulse 84   Wt 71.2 kg (157 lb)   BMI 22.53 kg/m²       Physical Exam  Psychiatric:         Speech: Speech normal.       Neurological Exam  Mental Status  Awake, alert and oriented to person, place and " time. Speech is normal. Language is fluent with no aphasia. Attention and concentration are normal. Fund of knowledge is appropriate for level of education.    Cranial Nerves  CN II-XII grossly intact.    Motor  Normal muscle bulk throughout. No fasciculations present. Normal muscle tone. No abnormal involuntary movements.  Strength at least antigravity throughout.    Sensory  Light touch is normal in upper and lower extremities.     Coordination  Right: Finger-to-nose normal.Left: Finger-to-nose normal.    Gait  Casual gait is normal including stance, stride, and arm swing.        Transthoracic Echo (TTE) Complete 07/17/2024    [place] [address]  Phone [phone] Fax [phone]    TRANSTHORACIC ECHOCARDIOGRAM REPORT    Patient Name:      EAGLE HILL       Reading Physician:    [id number] Ladonna Newman MD  Study Date:        7/17/2024             Ordering Provider:    [id number] SIDNEY ANDERSEN  MRN/PID:           [id number]              Fellow:  Accession#:        [id number]          Nurse:  Date of Birth/Age: 1945 / 79 years  Sonographer:          Julianna Galicia RDCS  Gender:            M                     Additional Staff:  Height:            177.80 cm             Admit Date:  Weight:            73.48 kg              Admission Status:     Inpatient -  Routine  BSA / BMI:         1.91 m2 / 23.24 kg/m2 Department Location:  [facility]  Lab  Blood Pressure: 126 /72 mmHg    Study Type:    TRANSTHORACIC ECHO (TTE) COMPLETE  Diagnosis/ICD: Dizziness and giddiness-R42; Unspecified atrial  fibrillation-I48.91  Indication:    Dizziness, AfIB  CPT Codes:     Echo Complete w Full Doppler-[id number]  Study Detail: The following Echo studies were performed: 2D, M-Mode, Doppler and  color flow.      PHYSICIAN INTERPRETATION:  Left Ventricle: The left ventricular systolic function is low normal, with a visually estimated ejection fraction of 50-55%. There  are no regional wall motion abnormalities. The left ventricular cavity size is normal. Spectral Doppler shows a normal pattern of left ventricular diastolic filling.  Left Atrium: The left atrium is normal in size.  Right Ventricle: The right ventricle is normal in size. There is normal right ventricular global systolic function.  Right Atrium: The right atrium is normal in size.  Aortic Valve: The aortic valve is trileaflet. There is aortic valve annular calcification. The aortic valve dimensionless index is 0.71. There is mild aortic valve regurgitation. The peak instantaneous gradient of the aortic valve is 11.8 mmHg. The mean gradient of the aortic valve is 5.4 mmHg.  Mitral Valve: The mitral valve is mildly thickened. There is trace mitral valve regurgitation.  Tricuspid Valve: The tricuspid valve is structurally normal. There is trace to mild tricuspid regurgitation. The Doppler estimated RVSP is mildly elevated at 35.6 mmHg.  Pulmonic Valve: The pulmonic valve is structurally normal. There is trace pulmonic valve regurgitation.  Pericardium: There is no pericardial effusion noted.  Aorta: The aortic root is normal. The ascending aorta is at the upper limits of normal.      CONCLUSIONS:  1. The left ventricular systolic function is low normal, with a visually estimated ejection fraction of 50-55%.  2. There is normal right ventricular global systolic function.  3. Mildly elevated RVSP.  4. There is aortic valve annular calcification.  5. Mild aortic valve regurgitation.    QUANTITATIVE DATA SUMMARY:  2D MEASUREMENTS:  Normal Ranges:  LAs:           3.00 cm   (2.7-4.0cm)  IVSd:          1.02 cm   (0.6-1.1cm)  LVPWd:         0.98 cm   (0.6-1.1cm)  LVIDd:         3.77 cm   (3.9-5.9cm)  LVIDs:         2.58 cm  LV Mass Index: 60.7 g/m2  LV % FS        31.7 %    LA VOLUME:  Normal Ranges:  LA Vol A4C:        21.5 ml    (22+/-6mL/m2)  LA Vol A2C:        23.1 ml  LA Vol BP:         22.3 ml  LA Vol Index A4C:   11.3ml/m2  LA Vol Index A2C:  12.1 ml/m2  LA Vol Index BP:   11.7 ml/m2  LA Area A4C:       10.9 cm2  LA Area A2C:       11.3 cm2  LA Major Axis A4C: 4.7 cm  LA Major Axis A2C: 4.7 cm  LA Vol A4C:        20.1 ml  LA Vol A2C:        22.2 ml    RA VOLUME BY A/L METHOD:  Normal Ranges:  RA Area A4C: 14.2 cm2    AORTA MEASUREMENTS:  Normal Ranges:  Ao Sinus, d: 3.60 cm (2.1-3.5cm)  Ao STJ, d:   2.80 cm (1.7-3.4cm)  Asc Ao, d:   3.60 cm (2.1-3.4cm)    LV SYSTOLIC FUNCTION BY 2D PLANIMETRY (MOD):  Normal Ranges:  EF-A4C View:    58 % (>=55%)  EF-A2C View:    52 %  EF-Visual:      53 %  LV EF Reported: 53 %    LV DIASTOLIC FUNCTION:  Normal Ranges:  MV Peak E:    0.51 m/s  (0.7-1.2 m/s)  MV Peak A:    0.87 m/s  (0.42-0.7 m/s)  E/A Ratio:    0.58      (1.0-2.2)  MV e'         0.088 m/s (>8.0)  MV lateral e' 0.07 m/s  MV medial e'  0.11 m/s  E/e' Ratio:   5.77      (<8.0)    MITRAL VALVE:  Normal Ranges:  MV DT: 336 msec (150-240msec)    AORTIC VALVE:  Normal Ranges:  AoV Vmax:                1.72 m/s  (<=1.7m/s)  AoV Peak P.8 mmHg (<20mmHg)  AoV Mean P.4 mmHg  (1.7-11.5mmHg)  LVOT Max Shivam:            1.04 m/s  (<=1.1m/s)  AoV VTI:                 27.03 cm  (18-25cm)  LVOT VTI:                19.29 cm  LVOT Diameter:           2.31 cm   (1.8-2.4cm)  AoV Area, VTI:           2.98 cm2  (2.5-5.5cm2)  AoV Area,Vmax:           2.52 cm2  (2.5-4.5cm2)  AoV Dimensionless Index: 0.71    AORTIC INSUFFICIENCY:  AI Vmax:       3.79 m/s  AI Half-time:  476 msec  AI Decel Time: 1643 msec  AI Decel Rate: 230.70 cm/s2      RIGHT VENTRICLE:  RV Basal 3.12 cm  RV Mid   2.10 cm  RV Major 7.0 cm  TAPSE:   21.7 mm  RV s'    0.21 m/s    TRICUSPID VALVE/RVSP:  Normal Ranges:  Peak TR Velocity: 2.85 m/s  RV Syst Pressure: 35.6 mmHg (< 30mmHg)  IVC Diam:         0.90 cm    AORTA:  Asc Ao Diam 3.57 cm      [id number] Ladonna Newman MD  Electronically signed on 2024 at 4:49:14 PM        ** Final **      Lab Results    Component Value Date    WBC 11.8 (H) 07/17/2024    RBC 4.49 (L) 07/17/2024    HGB 15.0 07/17/2024    HCT 41.1 07/17/2024     07/17/2024     07/17/2024    K 3.8 07/17/2024     07/17/2024    BUN 20 07/17/2024    CREATININE 0.93 07/17/2024    EGFR 84 07/17/2024    CALCIUM 8.4 (L) 07/17/2024    ALKPHOS 58 07/17/2024    AST 13 07/17/2024    ALT 22 07/17/2024    MG 1.79 07/17/2024    HGBA1C 6.7 (H) 07/17/2024    LDLDIRECT 102 04/14/2021    LDLCALC 23 07/17/2024    CHOL 97 07/17/2024    HDL 28.4 07/17/2024    TRIG 229 (H) 07/17/2024    TSH 2.01 12/31/2019          ASSESSMENT & PLAN  Problem List Items Addressed This Visit       Cerebellar stroke (Multi)    Overview   July 2024  L cerebellum   MRI also showed remote B cerebellar infarcts and L basal ganglia infarct  On eliquis for afib  Daily 81 mg aspirin changed to 75 mg plavix daily but pt was unable to tolerate plavix (SOB and lightheaded) so back on 81 mg aspirin  On HI statin  Risks include afib, HTN, CAD, HLD and CHRISTINE          Continue 81 mg aspirin and eliquis  Continue stroke risk monitoring and management with PCP and cardiology  Discussed reaching out to PCP vs urgent care eval for cough/congestion/URI symptoms.   Increase fluids.     FU here PRN        Allyson Bucio, APRN-YUNG           [1]   Past Medical History:  Diagnosis Date    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 root     Atrial fibrillation (Multi)     BPH (benign prostatic hyperplasia) 05/28/2018    CAD (coronary artery disease)     Diverticulosis 05/28/2018    Hypertension     NSTEMI (non-ST elevated myocardial infarction) (Multi) 05/28/2018   [2]   Past Surgical History:  Procedure Laterality Date    CORONARY ANGIOPLASTY  06/12/2018    PTCA    CT ANGIO NECK  11/9/2021    CT NECK ANGIO W AND WO IV CONTRAST 11/9/2021 [facility]    CT HEAD ANGIO W AND WO IV CONTRAST  11/10/2021    CT HEAD ANGIO W AND WO IV CONTRAST 11/10/2021 [facility]    MR NECK ANGIO  W AND WO IV CONTRAST  11/10/2021    MR NECK ANGIO W AND WO IV CONTRAST 11/10/2021 [facility]    OTHER SURGICAL HISTORY  07/16/2020    Knee surgery

## 2025-05-29 ENCOUNTER — APPOINTMENT (OUTPATIENT)
Dept: CARDIOLOGY | Facility: HOSPITAL | Age: 80
End: 2025-05-29
Payer: MEDICARE

## 2025-08-25 ENCOUNTER — APPOINTMENT (OUTPATIENT)
Dept: CARDIOLOGY | Facility: HOSPITAL | Age: 80
End: 2025-08-25
Payer: MEDICARE